# Patient Record
Sex: FEMALE | Race: OTHER | NOT HISPANIC OR LATINO | URBAN - METROPOLITAN AREA
[De-identification: names, ages, dates, MRNs, and addresses within clinical notes are randomized per-mention and may not be internally consistent; named-entity substitution may affect disease eponyms.]

---

## 2017-03-24 ENCOUNTER — OUTPATIENT (OUTPATIENT)
Dept: OUTPATIENT SERVICES | Facility: HOSPITAL | Age: 67
LOS: 1 days | Discharge: HOME | End: 2017-03-24

## 2017-03-24 DIAGNOSIS — C91.10 CHRONIC LYMPHOCYTIC LEUKEMIA OF B-CELL TYPE NOT HAVING ACHIEVED REMISSION: ICD-10-CM

## 2017-03-26 ENCOUNTER — RESULT REVIEW (OUTPATIENT)
Age: 67
End: 2017-03-26

## 2017-03-27 ENCOUNTER — APPOINTMENT (OUTPATIENT)
Dept: HEMATOLOGY ONCOLOGY | Facility: CLINIC | Age: 67
End: 2017-03-27

## 2017-03-27 VITALS
BODY MASS INDEX: 25.97 KG/M2 | HEART RATE: 80 BPM | SYSTOLIC BLOOD PRESSURE: 150 MMHG | DIASTOLIC BLOOD PRESSURE: 90 MMHG | TEMPERATURE: 99.2 F | RESPIRATION RATE: 16 BRPM | WEIGHT: 152.12 LBS

## 2017-03-27 LAB
EOSINOPHIL # BLD AUTO: 0.1 K/UL — SIGNIFICANT CHANGE UP (ref 0–0.5)
EOSINOPHIL NFR BLD AUTO: 1 % — SIGNIFICANT CHANGE UP (ref 0–6)
HCT VFR BLD CALC: 39.6 % — SIGNIFICANT CHANGE UP (ref 34.5–45)
HGB BLD-MCNC: 12.9 G/DL — SIGNIFICANT CHANGE UP (ref 11.5–15.5)
LYMPHOCYTES # BLD AUTO: 60 K/UL — HIGH (ref 1–3.3)
LYMPHOCYTES # BLD AUTO: 87 % — HIGH (ref 13–44)
LYMPHOCYTES # SPEC AUTO: 4 % — HIGH (ref 0–0)
MCHC RBC-ENTMCNC: 28.2 PG — SIGNIFICANT CHANGE UP (ref 27–34)
MCHC RBC-ENTMCNC: 32.5 G/DL — SIGNIFICANT CHANGE UP (ref 32–36)
MCV RBC AUTO: 86.7 FL — SIGNIFICANT CHANGE UP (ref 80–100)
MONOCYTES # BLD AUTO: 0.7 K/UL — SIGNIFICANT CHANGE UP (ref 0–0.9)
MONOCYTES NFR BLD AUTO: 1 % — LOW (ref 2–14)
NEUTROPHILS # BLD AUTO: 2.8 K/UL — SIGNIFICANT CHANGE UP (ref 1.8–7.4)
NEUTROPHILS NFR BLD AUTO: 7 % — LOW (ref 43–77)
PLAT MORPH BLD: NORMAL — SIGNIFICANT CHANGE UP
PLATELET # BLD AUTO: 132 K/UL — LOW (ref 150–400)
RBC # BLD: 4.56 M/UL — SIGNIFICANT CHANGE UP (ref 3.8–5.2)
RBC # FLD: 12.5 % — SIGNIFICANT CHANGE UP (ref 10.3–14.5)
RBC BLD AUTO: SIGNIFICANT CHANGE UP
WBC # BLD: 67 K/UL — CRITICAL HIGH (ref 3.8–10.5)
WBC # FLD AUTO: 67 K/UL — CRITICAL HIGH (ref 3.8–10.5)

## 2017-03-27 RX ORDER — ALPRAZOLAM 0.25 MG/1
0.25 TABLET ORAL
Qty: 60 | Refills: 0 | Status: ACTIVE | COMMUNITY
Start: 2017-03-27 | End: 1900-01-01

## 2017-04-06 LAB
ALBUMIN SERPL ELPH-MCNC: 4.4 G/DL
ALP BLD-CCNC: 90 U/L
ALT SERPL-CCNC: 13 U/L
ANION GAP SERPL CALC-SCNC: 14 MMOL/L
AST SERPL-CCNC: 16 U/L
BILIRUB SERPL-MCNC: 0.3 MG/DL
BUN SERPL-MCNC: 11 MG/DL
CALCIUM SERPL-MCNC: 10.2 MG/DL
CHLORIDE SERPL-SCNC: 108 MMOL/L
CO2 SERPL-SCNC: 24 MMOL/L
CREAT SERPL-MCNC: 0.7 MG/DL
GLUCOSE SERPL-MCNC: 124 MG/DL
LDH SERPL-CCNC: 163 U/L
POTASSIUM SERPL-SCNC: 4.2 MMOL/L
PROT SERPL-MCNC: 6.4 G/DL
SODIUM SERPL-SCNC: 146 MMOL/L

## 2017-06-29 ENCOUNTER — OUTPATIENT (OUTPATIENT)
Dept: OUTPATIENT SERVICES | Facility: HOSPITAL | Age: 67
LOS: 1 days | Discharge: HOME | End: 2017-06-29

## 2017-06-29 DIAGNOSIS — C91.10 CHRONIC LYMPHOCYTIC LEUKEMIA OF B-CELL TYPE NOT HAVING ACHIEVED REMISSION: ICD-10-CM

## 2017-07-03 ENCOUNTER — APPOINTMENT (OUTPATIENT)
Dept: HEMATOLOGY ONCOLOGY | Facility: CLINIC | Age: 67
End: 2017-07-03

## 2017-08-02 ENCOUNTER — OUTPATIENT (OUTPATIENT)
Dept: OUTPATIENT SERVICES | Facility: HOSPITAL | Age: 67
LOS: 1 days | Discharge: HOME | End: 2017-08-02

## 2017-08-02 DIAGNOSIS — C91.10 CHRONIC LYMPHOCYTIC LEUKEMIA OF B-CELL TYPE NOT HAVING ACHIEVED REMISSION: ICD-10-CM

## 2017-08-07 ENCOUNTER — RESULT REVIEW (OUTPATIENT)
Age: 67
End: 2017-08-07

## 2017-08-07 ENCOUNTER — APPOINTMENT (OUTPATIENT)
Dept: HEMATOLOGY ONCOLOGY | Facility: CLINIC | Age: 67
End: 2017-08-07
Payer: MEDICARE

## 2017-08-07 VITALS
OXYGEN SATURATION: 100 % | RESPIRATION RATE: 16 BRPM | BODY MASS INDEX: 25.78 KG/M2 | TEMPERATURE: 97.6 F | HEART RATE: 66 BPM | HEIGHT: 64.17 IN | WEIGHT: 151.02 LBS | SYSTOLIC BLOOD PRESSURE: 172 MMHG | DIASTOLIC BLOOD PRESSURE: 78 MMHG

## 2017-08-07 LAB
BASOPHILS # BLD AUTO: 0.2 K/UL — SIGNIFICANT CHANGE UP (ref 0–0.2)
EOSINOPHIL # BLD AUTO: 0.2 K/UL — SIGNIFICANT CHANGE UP (ref 0–0.5)
HCT VFR BLD CALC: 39.1 % — SIGNIFICANT CHANGE UP (ref 34.5–45)
HGB BLD-MCNC: 13.4 G/DL — SIGNIFICANT CHANGE UP (ref 11.5–15.5)
LYMPHOCYTES # BLD AUTO: 66.2 K/UL — HIGH (ref 1–3.3)
LYMPHOCYTES # BLD AUTO: 94 % — HIGH (ref 13–44)
MCHC RBC-ENTMCNC: 30 PG — SIGNIFICANT CHANGE UP (ref 27–34)
MCHC RBC-ENTMCNC: 34.2 G/DL — SIGNIFICANT CHANGE UP (ref 32–36)
MCV RBC AUTO: 87.6 FL — SIGNIFICANT CHANGE UP (ref 80–100)
MONOCYTES # BLD AUTO: 0.9 K/UL — SIGNIFICANT CHANGE UP (ref 0–0.9)
MONOCYTES NFR BLD AUTO: 1 % — LOW (ref 2–14)
NEUTROPHILS # BLD AUTO: 3.5 K/UL — SIGNIFICANT CHANGE UP (ref 1.8–7.4)
NEUTROPHILS NFR BLD AUTO: 5 % — LOW (ref 43–77)
PLAT MORPH BLD: NORMAL — SIGNIFICANT CHANGE UP
PLATELET # BLD AUTO: 144 K/UL — LOW (ref 150–400)
RBC # BLD: 4.47 M/UL — SIGNIFICANT CHANGE UP (ref 3.8–5.2)
RBC # FLD: 12.4 % — SIGNIFICANT CHANGE UP (ref 10.3–14.5)
RBC BLD AUTO: SIGNIFICANT CHANGE UP
SMUDGE CELLS # BLD: PRESENT — SIGNIFICANT CHANGE UP
WBC # BLD: 71.2 K/UL — CRITICAL HIGH (ref 3.8–10.5)
WBC # FLD AUTO: 71.2 K/UL — CRITICAL HIGH (ref 3.8–10.5)

## 2017-08-07 PROCEDURE — 99213 OFFICE O/P EST LOW 20 MIN: CPT

## 2017-08-14 ENCOUNTER — APPOINTMENT (OUTPATIENT)
Dept: HEMATOLOGY ONCOLOGY | Facility: CLINIC | Age: 67
End: 2017-08-14
Payer: MEDICARE

## 2017-11-09 ENCOUNTER — OUTPATIENT (OUTPATIENT)
Dept: OUTPATIENT SERVICES | Facility: HOSPITAL | Age: 67
LOS: 1 days | Discharge: HOME | End: 2017-11-09

## 2017-11-09 DIAGNOSIS — C91.10 CHRONIC LYMPHOCYTIC LEUKEMIA OF B-CELL TYPE NOT HAVING ACHIEVED REMISSION: ICD-10-CM

## 2017-11-13 ENCOUNTER — RESULT REVIEW (OUTPATIENT)
Age: 67
End: 2017-11-13

## 2017-11-13 ENCOUNTER — APPOINTMENT (OUTPATIENT)
Dept: HEMATOLOGY ONCOLOGY | Facility: CLINIC | Age: 67
End: 2017-11-13

## 2017-11-13 LAB
BASOPHILS # BLD AUTO: 0.3 K/UL — HIGH (ref 0–0.2)
EOSINOPHIL # BLD AUTO: 0.2 K/UL — SIGNIFICANT CHANGE UP (ref 0–0.5)
HCT VFR BLD CALC: 40.9 % — SIGNIFICANT CHANGE UP (ref 34.5–45)
HGB BLD-MCNC: 13.8 G/DL — SIGNIFICANT CHANGE UP (ref 11.5–15.5)
LYMPHOCYTES # BLD AUTO: 62.1 K/UL — HIGH (ref 1–3.3)
LYMPHOCYTES # BLD AUTO: 85 % — HIGH (ref 13–44)
LYMPHOCYTES # SPEC AUTO: 3 % — HIGH (ref 0–0)
MCHC RBC-ENTMCNC: 29.6 PG — SIGNIFICANT CHANGE UP (ref 27–34)
MCHC RBC-ENTMCNC: 33.7 G/DL — SIGNIFICANT CHANGE UP (ref 32–36)
MCV RBC AUTO: 87.8 FL — SIGNIFICANT CHANGE UP (ref 80–100)
MONOCYTES # BLD AUTO: 0.7 K/UL — SIGNIFICANT CHANGE UP (ref 0–0.9)
MONOCYTES NFR BLD AUTO: 1 % — LOW (ref 2–14)
NEUTROPHILS # BLD AUTO: 3.5 K/UL — SIGNIFICANT CHANGE UP (ref 1.8–7.4)
NEUTROPHILS NFR BLD AUTO: 11 % — LOW (ref 43–77)
PLAT MORPH BLD: NORMAL — SIGNIFICANT CHANGE UP
PLATELET # BLD AUTO: 164 K/UL — SIGNIFICANT CHANGE UP (ref 150–400)
RBC # BLD: 4.66 M/UL — SIGNIFICANT CHANGE UP (ref 3.8–5.2)
RBC # FLD: 12.3 % — SIGNIFICANT CHANGE UP (ref 10.3–14.5)
RBC BLD AUTO: SIGNIFICANT CHANGE UP
SMUDGE CELLS # BLD: PRESENT — SIGNIFICANT CHANGE UP
WBC # BLD: 66.8 K/UL — CRITICAL HIGH (ref 3.8–10.5)
WBC # FLD AUTO: 66.8 K/UL — CRITICAL HIGH (ref 3.8–10.5)

## 2018-01-18 ENCOUNTER — OUTPATIENT (OUTPATIENT)
Dept: OUTPATIENT SERVICES | Facility: HOSPITAL | Age: 68
LOS: 1 days | Discharge: HOME | End: 2018-01-18

## 2018-01-18 DIAGNOSIS — C91.10 CHRONIC LYMPHOCYTIC LEUKEMIA OF B-CELL TYPE NOT HAVING ACHIEVED REMISSION: ICD-10-CM

## 2018-01-22 ENCOUNTER — RESULT REVIEW (OUTPATIENT)
Age: 68
End: 2018-01-22

## 2018-01-22 ENCOUNTER — APPOINTMENT (OUTPATIENT)
Dept: HEMATOLOGY ONCOLOGY | Facility: CLINIC | Age: 68
End: 2018-01-22
Payer: MEDICARE

## 2018-01-22 ENCOUNTER — APPOINTMENT (OUTPATIENT)
Dept: INFUSION THERAPY | Facility: HOSPITAL | Age: 68
End: 2018-01-22
Payer: MEDICARE

## 2018-01-22 VITALS
TEMPERATURE: 98.1 F | RESPIRATION RATE: 16 BRPM | WEIGHT: 149.91 LBS | OXYGEN SATURATION: 99 % | HEART RATE: 73 BPM | DIASTOLIC BLOOD PRESSURE: 80 MMHG | BODY MASS INDEX: 25.59 KG/M2 | SYSTOLIC BLOOD PRESSURE: 164 MMHG

## 2018-01-22 VITALS — DIASTOLIC BLOOD PRESSURE: 78 MMHG | SYSTOLIC BLOOD PRESSURE: 142 MMHG

## 2018-01-22 LAB
ANISOCYTOSIS BLD QL: SLIGHT — SIGNIFICANT CHANGE UP
BASOPHILS # BLD AUTO: 0.4 K/UL — HIGH (ref 0–0.2)
EOSINOPHIL # BLD AUTO: 0.1 K/UL — SIGNIFICANT CHANGE UP (ref 0–0.5)
HCT VFR BLD CALC: 40.7 % — SIGNIFICANT CHANGE UP (ref 34.5–45)
HGB BLD-MCNC: 13.9 G/DL — SIGNIFICANT CHANGE UP (ref 11.5–15.5)
LYMPHOCYTES # BLD AUTO: 66.1 K/UL — HIGH (ref 1–3.3)
LYMPHOCYTES # BLD AUTO: 93 % — HIGH (ref 13–44)
LYMPHOCYTES # SPEC AUTO: 1 % — HIGH (ref 0–0)
MCHC RBC-ENTMCNC: 29.9 PG — SIGNIFICANT CHANGE UP (ref 27–34)
MCHC RBC-ENTMCNC: 34.2 G/DL — SIGNIFICANT CHANGE UP (ref 32–36)
MCV RBC AUTO: 87.5 FL — SIGNIFICANT CHANGE UP (ref 80–100)
MONOCYTES # BLD AUTO: 0.5 K/UL — SIGNIFICANT CHANGE UP (ref 0–0.9)
MONOCYTES NFR BLD AUTO: 1 % — LOW (ref 2–14)
NEUTROPHILS # BLD AUTO: 3.5 K/UL — SIGNIFICANT CHANGE UP (ref 1.8–7.4)
NEUTROPHILS NFR BLD AUTO: 5 % — LOW (ref 43–77)
PLAT MORPH BLD: NORMAL — SIGNIFICANT CHANGE UP
PLATELET # BLD AUTO: 139 K/UL — LOW (ref 150–400)
POIKILOCYTOSIS BLD QL AUTO: SLIGHT — SIGNIFICANT CHANGE UP
POLYCHROMASIA BLD QL SMEAR: SLIGHT — SIGNIFICANT CHANGE UP
RBC # BLD: 4.65 M/UL — SIGNIFICANT CHANGE UP (ref 3.8–5.2)
RBC # FLD: 12.3 % — SIGNIFICANT CHANGE UP (ref 10.3–14.5)
RBC BLD AUTO: SIGNIFICANT CHANGE UP
SMUDGE CELLS # BLD: PRESENT — SIGNIFICANT CHANGE UP
WBC # BLD: 70.7 K/UL — CRITICAL HIGH (ref 3.8–10.5)
WBC # FLD AUTO: 70.7 K/UL — CRITICAL HIGH (ref 3.8–10.5)

## 2018-01-22 PROCEDURE — 99213 OFFICE O/P EST LOW 20 MIN: CPT | Mod: 25

## 2018-01-22 PROCEDURE — G0008: CPT

## 2018-01-22 RX ORDER — CARVEDILOL 3.12 MG/1
3.12 TABLET, FILM COATED ORAL
Qty: 180 | Refills: 0 | Status: ACTIVE | COMMUNITY
Start: 2017-09-19

## 2018-01-22 RX ORDER — LISINOPRIL AND HYDROCHLOROTHIAZIDE TABLETS 20; 25 MG/1; MG/1
20-25 TABLET ORAL
Qty: 30 | Refills: 0 | Status: ACTIVE | COMMUNITY
Start: 2017-07-24

## 2018-04-19 ENCOUNTER — OUTPATIENT (OUTPATIENT)
Dept: OUTPATIENT SERVICES | Facility: HOSPITAL | Age: 68
LOS: 1 days | Discharge: HOME | End: 2018-04-19

## 2018-04-19 DIAGNOSIS — C91.10 CHRONIC LYMPHOCYTIC LEUKEMIA OF B-CELL TYPE NOT HAVING ACHIEVED REMISSION: ICD-10-CM

## 2018-04-23 ENCOUNTER — RESULT REVIEW (OUTPATIENT)
Age: 68
End: 2018-04-23

## 2018-04-23 ENCOUNTER — APPOINTMENT (OUTPATIENT)
Dept: HEMATOLOGY ONCOLOGY | Facility: CLINIC | Age: 68
End: 2018-04-23
Payer: MEDICARE

## 2018-04-23 VITALS
RESPIRATION RATE: 16 BRPM | DIASTOLIC BLOOD PRESSURE: 80 MMHG | SYSTOLIC BLOOD PRESSURE: 120 MMHG | HEART RATE: 68 BPM | TEMPERATURE: 98.2 F | WEIGHT: 152.12 LBS | BODY MASS INDEX: 25.97 KG/M2 | OXYGEN SATURATION: 98 %

## 2018-04-23 LAB
BASOPHILS # BLD AUTO: 0 K/UL — SIGNIFICANT CHANGE UP (ref 0–0.2)
EOSINOPHIL # BLD AUTO: 0.1 K/UL — SIGNIFICANT CHANGE UP (ref 0–0.5)
HCT VFR BLD CALC: 39.7 % — SIGNIFICANT CHANGE UP (ref 34.5–45)
HGB BLD-MCNC: 13.6 G/DL — SIGNIFICANT CHANGE UP (ref 11.5–15.5)
LYMPHOCYTES # BLD AUTO: 72.3 K/UL — HIGH (ref 1–3.3)
LYMPHOCYTES # BLD AUTO: 94 % — HIGH (ref 13–44)
MCHC RBC-ENTMCNC: 29.8 PG — SIGNIFICANT CHANGE UP (ref 27–34)
MCHC RBC-ENTMCNC: 34.3 G/DL — SIGNIFICANT CHANGE UP (ref 32–36)
MCV RBC AUTO: 86.9 FL — SIGNIFICANT CHANGE UP (ref 80–100)
MONOCYTES # BLD AUTO: 0.9 K/UL — SIGNIFICANT CHANGE UP (ref 0–0.9)
MONOCYTES NFR BLD AUTO: 1 % — LOW (ref 2–14)
NEUTROPHILS # BLD AUTO: 3.4 K/UL — SIGNIFICANT CHANGE UP (ref 1.8–7.4)
NEUTROPHILS NFR BLD AUTO: 5 % — LOW (ref 43–77)
PLAT MORPH BLD: NORMAL — SIGNIFICANT CHANGE UP
PLATELET # BLD AUTO: 147 K/UL — LOW (ref 150–400)
RBC # BLD: 4.57 M/UL — SIGNIFICANT CHANGE UP (ref 3.8–5.2)
RBC # FLD: 12.5 % — SIGNIFICANT CHANGE UP (ref 10.3–14.5)
RBC BLD AUTO: NORMAL — SIGNIFICANT CHANGE UP
SMUDGE CELLS # BLD: PRESENT — SIGNIFICANT CHANGE UP
WBC # BLD: 76.8 K/UL — CRITICAL HIGH (ref 3.8–10.5)
WBC # FLD AUTO: 76.8 K/UL — CRITICAL HIGH (ref 3.8–10.5)

## 2018-04-23 PROCEDURE — 99213 OFFICE O/P EST LOW 20 MIN: CPT

## 2018-07-12 ENCOUNTER — OUTPATIENT (OUTPATIENT)
Dept: OUTPATIENT SERVICES | Facility: HOSPITAL | Age: 68
LOS: 1 days | Discharge: HOME | End: 2018-07-12

## 2018-07-12 DIAGNOSIS — C91.10 CHRONIC LYMPHOCYTIC LEUKEMIA OF B-CELL TYPE NOT HAVING ACHIEVED REMISSION: ICD-10-CM

## 2018-07-16 ENCOUNTER — RESULT REVIEW (OUTPATIENT)
Age: 68
End: 2018-07-16

## 2018-07-16 ENCOUNTER — APPOINTMENT (OUTPATIENT)
Dept: HEMATOLOGY ONCOLOGY | Facility: CLINIC | Age: 68
End: 2018-07-16
Payer: MEDICARE

## 2018-07-16 VITALS
RESPIRATION RATE: 16 BRPM | TEMPERATURE: 97.8 F | DIASTOLIC BLOOD PRESSURE: 77 MMHG | HEART RATE: 68 BPM | BODY MASS INDEX: 26.16 KG/M2 | SYSTOLIC BLOOD PRESSURE: 164 MMHG | WEIGHT: 153.22 LBS | OXYGEN SATURATION: 95 %

## 2018-07-16 LAB
BASOPHILS # BLD AUTO: 0.1 K/UL — SIGNIFICANT CHANGE UP (ref 0–0.2)
EOSINOPHIL # BLD AUTO: 0.1 K/UL — SIGNIFICANT CHANGE UP (ref 0–0.5)
HCT VFR BLD CALC: 40.4 % — SIGNIFICANT CHANGE UP (ref 34.5–45)
HGB BLD-MCNC: 13.7 G/DL — SIGNIFICANT CHANGE UP (ref 11.5–15.5)
LYMPHOCYTES # BLD AUTO: 71.6 K/UL — HIGH (ref 1–3.3)
LYMPHOCYTES # BLD AUTO: 92 % — HIGH (ref 13–44)
MCHC RBC-ENTMCNC: 29.6 PG — SIGNIFICANT CHANGE UP (ref 27–34)
MCHC RBC-ENTMCNC: 33.9 G/DL — SIGNIFICANT CHANGE UP (ref 32–36)
MCV RBC AUTO: 87.3 FL — SIGNIFICANT CHANGE UP (ref 80–100)
MONOCYTES # BLD AUTO: 0.3 K/UL — SIGNIFICANT CHANGE UP (ref 0–0.9)
NEUTROPHILS # BLD AUTO: 3.6 K/UL — SIGNIFICANT CHANGE UP (ref 1.8–7.4)
NEUTROPHILS NFR BLD AUTO: 8 % — LOW (ref 43–77)
PLAT MORPH BLD: NORMAL — SIGNIFICANT CHANGE UP
PLATELET # BLD AUTO: 136 K/UL — LOW (ref 150–400)
RBC # BLD: 4.63 M/UL — SIGNIFICANT CHANGE UP (ref 3.8–5.2)
RBC # FLD: 12.4 % — SIGNIFICANT CHANGE UP (ref 10.3–14.5)
RBC BLD AUTO: NORMAL — SIGNIFICANT CHANGE UP
SMUDGE CELLS # BLD: PRESENT — SIGNIFICANT CHANGE UP
WBC # BLD: 75.7 K/UL — CRITICAL HIGH (ref 3.8–10.5)
WBC # FLD AUTO: 75.7 K/UL — CRITICAL HIGH (ref 3.8–10.5)

## 2018-07-16 PROCEDURE — 99213 OFFICE O/P EST LOW 20 MIN: CPT

## 2018-10-26 ENCOUNTER — OUTPATIENT (OUTPATIENT)
Dept: OUTPATIENT SERVICES | Facility: HOSPITAL | Age: 68
LOS: 1 days | Discharge: HOME | End: 2018-10-26

## 2018-10-26 DIAGNOSIS — C91.10 CHRONIC LYMPHOCYTIC LEUKEMIA OF B-CELL TYPE NOT HAVING ACHIEVED REMISSION: ICD-10-CM

## 2018-11-05 ENCOUNTER — RESULT REVIEW (OUTPATIENT)
Age: 68
End: 2018-11-05

## 2018-11-05 ENCOUNTER — APPOINTMENT (OUTPATIENT)
Dept: INFUSION THERAPY | Facility: HOSPITAL | Age: 68
End: 2018-11-05
Payer: MEDICARE

## 2018-11-05 ENCOUNTER — APPOINTMENT (OUTPATIENT)
Dept: HEMATOLOGY ONCOLOGY | Facility: CLINIC | Age: 68
End: 2018-11-05
Payer: MEDICARE

## 2018-11-05 VITALS
WEIGHT: 152.12 LBS | HEART RATE: 76 BPM | BODY MASS INDEX: 25.97 KG/M2 | RESPIRATION RATE: 16 BRPM | TEMPERATURE: 98.3 F | DIASTOLIC BLOOD PRESSURE: 87 MMHG | SYSTOLIC BLOOD PRESSURE: 172 MMHG | OXYGEN SATURATION: 99 %

## 2018-11-05 LAB
ANISOCYTOSIS BLD QL: SLIGHT — SIGNIFICANT CHANGE UP
BASOPHILS # BLD AUTO: 0.3 K/UL — HIGH (ref 0–0.2)
ELLIPTOCYTES BLD QL SMEAR: SLIGHT — SIGNIFICANT CHANGE UP
EOSINOPHIL # BLD AUTO: 0.1 K/UL — SIGNIFICANT CHANGE UP (ref 0–0.5)
HCT VFR BLD CALC: 41 % — SIGNIFICANT CHANGE UP (ref 34.5–45)
HGB BLD-MCNC: 13.8 G/DL — SIGNIFICANT CHANGE UP (ref 11.5–15.5)
LYMPHOCYTES # BLD AUTO: 67.5 K/UL — HIGH (ref 1–3.3)
LYMPHOCYTES # BLD AUTO: 93 % — HIGH (ref 13–44)
MACROCYTES BLD QL: SLIGHT — SIGNIFICANT CHANGE UP
MCHC RBC-ENTMCNC: 29.5 PG — SIGNIFICANT CHANGE UP (ref 27–34)
MCHC RBC-ENTMCNC: 33.7 G/DL — SIGNIFICANT CHANGE UP (ref 32–36)
MCV RBC AUTO: 87.5 FL — SIGNIFICANT CHANGE UP (ref 80–100)
MICROCYTES BLD QL: SLIGHT — SIGNIFICANT CHANGE UP
MONOCYTES # BLD AUTO: 0.5 K/UL — SIGNIFICANT CHANGE UP (ref 0–0.9)
NEUTROPHILS # BLD AUTO: 2.9 K/UL — SIGNIFICANT CHANGE UP (ref 1.8–7.4)
NEUTROPHILS NFR BLD AUTO: 6 % — LOW (ref 43–77)
NEUTS BAND # BLD: 1 % — SIGNIFICANT CHANGE UP (ref 0–8)
PLAT MORPH BLD: NORMAL — SIGNIFICANT CHANGE UP
PLATELET # BLD AUTO: 143 K/UL — LOW (ref 150–400)
POIKILOCYTOSIS BLD QL AUTO: SLIGHT — SIGNIFICANT CHANGE UP
POLYCHROMASIA BLD QL SMEAR: SLIGHT — SIGNIFICANT CHANGE UP
RBC # BLD: 4.69 M/UL — SIGNIFICANT CHANGE UP (ref 3.8–5.2)
RBC # FLD: 12.5 % — SIGNIFICANT CHANGE UP (ref 10.3–14.5)
RBC BLD AUTO: SIGNIFICANT CHANGE UP
SMUDGE CELLS # BLD: PRESENT — SIGNIFICANT CHANGE UP
WBC # BLD: 71.3 K/UL — CRITICAL HIGH (ref 3.8–10.5)
WBC # FLD AUTO: 71.3 K/UL — CRITICAL HIGH (ref 3.8–10.5)

## 2018-11-05 PROCEDURE — 99213 OFFICE O/P EST LOW 20 MIN: CPT

## 2018-11-05 PROCEDURE — G0008: CPT

## 2018-11-05 NOTE — HISTORY OF PRESENT ILLNESS
[de-identified] : CLL- Stage I diagnosed 1/2012, del 13q [de-identified] : Patient presents for a follow up, accompanied by her brother. She is doing well today, continues to have severe anxiety about her disease. Her blood pressure was 172/87 today, mostly because she continues to be anxious, at home her pressures are better.  She denies any loss of appetite, no fever/chills, no night sweats, no recent infections, no chest pain, no SOB, no abdominal pain, no n/v/d.

## 2018-11-05 NOTE — ASSESSMENT
[FreeTextEntry1] : 68 year old female with Stage I CLL, del 13q, continues to have stable disease and remains asymptomatic.  \par She has no palpable lymphadenopathy or splenomegaly.  Her counts are also unchanged, and she does not require any hematologic intervention at this time.\par She continues to exhibit severe anxiety in relation to her disease, I have stressed psychiatry consult- she continues to not complete this. \par Flu shot today.\par Continue periodic monitoring, every 3-4 months. \par \par

## 2018-11-05 NOTE — PHYSICAL EXAM
[Fully active, able to carry on all pre-disease performance without restriction] : Status 0 - Fully active, able to carry on all pre-disease performance without restriction [Normal] : affect appropriate [de-identified] : Visibly anxious  [de-identified] : no palpable cervical/axillary, inguinal lymph nodes.

## 2018-11-05 NOTE — REASON FOR VISIT
[Follow-Up Visit] : a follow-up visit for [CLL] : chronic lymphocytic leukemia [Family Member] : family member

## 2018-11-05 NOTE — ADDENDUM
[FreeTextEntry1] : Documented by Keisha Abbasi acting as a scribe for Dr. Barbara Renteria on 11/05//2018.\par \par All medical record entries made by the Scribe were at my, Dr. Barbara Renteria's, direction and personally dictated by me on 11/05/2018. I have reviewed the chart and agree that  the record accurately reflects my personal performance of the history, physical exam, assessment and plan. I have also personally directed, reviewed, and agree with the discharge instructions.\par

## 2018-11-05 NOTE — REVIEW OF SYSTEMS
[Insomnia] : insomnia [Anxiety] : anxiety [Depression] : depression [Negative] : Allergic/Immunologic [Suicidal] : not suicidal

## 2019-02-26 ENCOUNTER — OUTPATIENT (OUTPATIENT)
Dept: OUTPATIENT SERVICES | Facility: HOSPITAL | Age: 69
LOS: 1 days | Discharge: HOME | End: 2019-02-26

## 2019-02-26 DIAGNOSIS — C91.10 CHRONIC LYMPHOCYTIC LEUKEMIA OF B-CELL TYPE NOT HAVING ACHIEVED REMISSION: ICD-10-CM

## 2019-03-11 ENCOUNTER — RESULT REVIEW (OUTPATIENT)
Age: 69
End: 2019-03-11

## 2019-03-11 ENCOUNTER — APPOINTMENT (OUTPATIENT)
Dept: HEMATOLOGY ONCOLOGY | Facility: CLINIC | Age: 69
End: 2019-03-11
Payer: MEDICARE

## 2019-03-11 VITALS
SYSTOLIC BLOOD PRESSURE: 180 MMHG | TEMPERATURE: 97.7 F | RESPIRATION RATE: 16 BRPM | BODY MASS INDEX: 25.78 KG/M2 | HEART RATE: 79 BPM | OXYGEN SATURATION: 97 % | WEIGHT: 151.01 LBS | DIASTOLIC BLOOD PRESSURE: 83 MMHG

## 2019-03-11 LAB
BASOPHILS # BLD AUTO: 0.1 K/UL — SIGNIFICANT CHANGE UP (ref 0–0.2)
EOSINOPHIL # BLD AUTO: 0.1 K/UL — SIGNIFICANT CHANGE UP (ref 0–0.5)
HCT VFR BLD CALC: 41.4 % — SIGNIFICANT CHANGE UP (ref 34.5–45)
HGB BLD-MCNC: 13.8 G/DL — SIGNIFICANT CHANGE UP (ref 11.5–15.5)
LYMPHOCYTES # BLD AUTO: 74.4 K/UL — HIGH (ref 1–3.3)
LYMPHOCYTES # BLD AUTO: 90 % — HIGH (ref 13–44)
MCHC RBC-ENTMCNC: 28.9 PG — SIGNIFICANT CHANGE UP (ref 27–34)
MCHC RBC-ENTMCNC: 33.4 G/DL — SIGNIFICANT CHANGE UP (ref 32–36)
MCV RBC AUTO: 86.5 FL — SIGNIFICANT CHANGE UP (ref 80–100)
MONOCYTES # BLD AUTO: 0.4 K/UL — SIGNIFICANT CHANGE UP (ref 0–0.9)
MONOCYTES NFR BLD AUTO: 1 % — LOW (ref 2–14)
NEUTROPHILS # BLD AUTO: 3.3 K/UL — SIGNIFICANT CHANGE UP (ref 1.8–7.4)
NEUTROPHILS NFR BLD AUTO: 8 % — LOW (ref 43–77)
PLAT MORPH BLD: NORMAL — SIGNIFICANT CHANGE UP
PLATELET # BLD AUTO: 171 K/UL — SIGNIFICANT CHANGE UP (ref 150–400)
RBC # BLD: 4.78 M/UL — SIGNIFICANT CHANGE UP (ref 3.8–5.2)
RBC # FLD: 12.5 % — SIGNIFICANT CHANGE UP (ref 10.3–14.5)
RBC BLD AUTO: SIGNIFICANT CHANGE UP
SMUDGE CELLS # BLD: PRESENT — SIGNIFICANT CHANGE UP
VARIANT LYMPHS # BLD: 1 % — SIGNIFICANT CHANGE UP (ref 0–6)
WBC # BLD: 78.4 K/UL — CRITICAL HIGH (ref 3.8–10.5)
WBC # FLD AUTO: 78.4 K/UL — CRITICAL HIGH (ref 3.8–10.5)

## 2019-03-11 PROCEDURE — 99213 OFFICE O/P EST LOW 20 MIN: CPT

## 2019-03-12 NOTE — ASSESSMENT
[FreeTextEntry1] : 68 year old female with Stage I CLL, del 13q, continues to have stable disease and remains asymptomatic.  \par She has stable axillary lymphadenopathy, no palpable splenomegaly or constitutional symptoms.\par Her counts are also unchanged- no anemia or thrombocytopenia. \par I have again stressed her to see psychiatry.  \par Check her CMP, [facility] today.  \par Continue periodic monitoring, every 3-4 months. \par \par

## 2019-03-12 NOTE — REVIEW OF SYSTEMS
[Suicidal] : not suicidal [Insomnia] : insomnia [Anxiety] : anxiety [Depression] : depression [Negative] : Allergic/Immunologic

## 2019-03-12 NOTE — HISTORY OF PRESENT ILLNESS
[de-identified] : CLL- Stage I diagnosed 1/2012, del 13q [de-identified] : Patient presents for a follow up.  Overall she continues to severe anxiety, continues to not see psychiatry.  Her BP remains high in the office but decreases to normal at home.  She has no new symptoms or complaints.  She denies any night sweats, no weight loss, no change in her appetite.  \par She has no chest pain, no SOB, no abdominal c/o, no n/v/d.

## 2019-03-12 NOTE — PHYSICAL EXAM
[Fully active, able to carry on all pre-disease performance without restriction] : Status 0 - Fully active, able to carry on all pre-disease performance without restriction [Normal] : affect appropriate [de-identified] : Visibly anxious  [de-identified] : bilateral axillary lymph nodes, around 1-2 cm, no palpable cervical or axillary lymph nodes

## 2019-03-13 LAB
ALBUMIN SERPL ELPH-MCNC: 4.5 G/DL
ALBUMIN SERPL ELPH-MCNC: 4.5 G/DL
ALBUMIN SERPL ELPH-MCNC: 4.6 G/DL
ALP BLD-CCNC: 72 U/L
ALP BLD-CCNC: 77 U/L
ALP BLD-CCNC: 78 U/L
ALP BLD-CCNC: 78 U/L
ALP BLD-CCNC: 80 U/L
ALT SERPL-CCNC: 17 U/L
ALT SERPL-CCNC: 18 U/L
ALT SERPL-CCNC: 20 U/L
ANION GAP SERPL CALC-SCNC: 11 MMOL/L
ANION GAP SERPL CALC-SCNC: 12 MMOL/L
ANION GAP SERPL CALC-SCNC: 13 MMOL/L
ANION GAP SERPL CALC-SCNC: 15 MMOL/L
ANION GAP SERPL CALC-SCNC: 17 MMOL/L
AST SERPL-CCNC: 19 U/L
AST SERPL-CCNC: 19 U/L
AST SERPL-CCNC: 20 U/L
AST SERPL-CCNC: 21 U/L
AST SERPL-CCNC: 22 U/L
BILIRUB SERPL-MCNC: 0.2 MG/DL
BILIRUB SERPL-MCNC: 0.3 MG/DL
BILIRUB SERPL-MCNC: 0.4 MG/DL
BUN SERPL-MCNC: 14 MG/DL
BUN SERPL-MCNC: 15 MG/DL
BUN SERPL-MCNC: 16 MG/DL
BUN SERPL-MCNC: 17 MG/DL
BUN SERPL-MCNC: 17 MG/DL
CALCIUM SERPL-MCNC: 10.4 MG/DL
CALCIUM SERPL-MCNC: 10.5 MG/DL
CALCIUM SERPL-MCNC: 10.5 MG/DL
CALCIUM SERPL-MCNC: 10.7 MG/DL
CALCIUM SERPL-MCNC: 10.7 MG/DL
CHLORIDE SERPL-SCNC: 101 MMOL/L
CHLORIDE SERPL-SCNC: 102 MMOL/L
CHLORIDE SERPL-SCNC: 104 MMOL/L
CO2 SERPL-SCNC: 25 MMOL/L
CO2 SERPL-SCNC: 26 MMOL/L
CO2 SERPL-SCNC: 28 MMOL/L
CO2 SERPL-SCNC: 28 MMOL/L
CO2 SERPL-SCNC: 29 MMOL/L
CREAT SERPL-MCNC: 0.65 MG/DL
CREAT SERPL-MCNC: 0.68 MG/DL
CREAT SERPL-MCNC: 0.69 MG/DL
CREAT SERPL-MCNC: 0.72 MG/DL
CREAT SERPL-MCNC: 0.72 MG/DL
GLUCOSE SERPL-MCNC: 111 MG/DL
GLUCOSE SERPL-MCNC: 112 MG/DL
GLUCOSE SERPL-MCNC: 113 MG/DL
GLUCOSE SERPL-MCNC: 136 MG/DL
GLUCOSE SERPL-MCNC: 90 MG/DL
LDH SERPL-CCNC: 159 U/L
LDH SERPL-CCNC: 162 U/L
LDH SERPL-CCNC: 170 U/L
LDH SERPL-CCNC: 177 U/L
LDH SERPL-CCNC: 183 U/L
POTASSIUM SERPL-SCNC: 3.1 MMOL/L
POTASSIUM SERPL-SCNC: 3.2 MMOL/L
POTASSIUM SERPL-SCNC: 3.4 MMOL/L
POTASSIUM SERPL-SCNC: 3.4 MMOL/L
POTASSIUM SERPL-SCNC: 3.9 MMOL/L
PROT SERPL-MCNC: 6.4 G/DL
PROT SERPL-MCNC: 6.5 G/DL
PROT SERPL-MCNC: 6.7 G/DL
PROT SERPL-MCNC: 6.8 G/DL
PROT SERPL-MCNC: 6.8 G/DL
SODIUM SERPL-SCNC: 142 MMOL/L
SODIUM SERPL-SCNC: 144 MMOL/L
SODIUM SERPL-SCNC: 145 MMOL/L

## 2019-06-12 ENCOUNTER — OUTPATIENT (OUTPATIENT)
Dept: OUTPATIENT SERVICES | Facility: HOSPITAL | Age: 69
LOS: 1 days | Discharge: HOME | End: 2019-06-12

## 2019-06-12 DIAGNOSIS — C91.10 CHRONIC LYMPHOCYTIC LEUKEMIA OF B-CELL TYPE NOT HAVING ACHIEVED REMISSION: ICD-10-CM

## 2019-06-17 ENCOUNTER — APPOINTMENT (OUTPATIENT)
Dept: HEMATOLOGY ONCOLOGY | Facility: CLINIC | Age: 69
End: 2019-06-17
Payer: MEDICARE

## 2019-06-17 ENCOUNTER — RESULT REVIEW (OUTPATIENT)
Age: 69
End: 2019-06-17

## 2019-06-17 VITALS
TEMPERATURE: 98.2 F | WEIGHT: 149.91 LBS | BODY MASS INDEX: 25.59 KG/M2 | SYSTOLIC BLOOD PRESSURE: 162 MMHG | RESPIRATION RATE: 17 BRPM | OXYGEN SATURATION: 97 % | DIASTOLIC BLOOD PRESSURE: 76 MMHG | HEART RATE: 67 BPM

## 2019-06-17 LAB
BASOPHILS # BLD AUTO: 0.3 K/UL — HIGH (ref 0–0.2)
EOSINOPHIL # BLD AUTO: 0.1 K/UL — SIGNIFICANT CHANGE UP (ref 0–0.5)
HCT VFR BLD CALC: 40.2 % — SIGNIFICANT CHANGE UP (ref 34.5–45)
HGB BLD-MCNC: 13.5 G/DL — SIGNIFICANT CHANGE UP (ref 11.5–15.5)
LYMPHOCYTES # BLD AUTO: 70 K/UL — HIGH (ref 1–3.3)
LYMPHOCYTES # BLD AUTO: 91 % — HIGH (ref 13–44)
LYMPHOCYTES # SPEC AUTO: 2 % — HIGH (ref 0–0)
MCHC RBC-ENTMCNC: 29.9 PG — SIGNIFICANT CHANGE UP (ref 27–34)
MCHC RBC-ENTMCNC: 33.5 G/DL — SIGNIFICANT CHANGE UP (ref 32–36)
MCV RBC AUTO: 89.3 FL — SIGNIFICANT CHANGE UP (ref 80–100)
MONOCYTES # BLD AUTO: 0.6 K/UL — SIGNIFICANT CHANGE UP (ref 0–0.9)
MONOCYTES NFR BLD AUTO: 2 % — SIGNIFICANT CHANGE UP (ref 2–14)
NEUTROPHILS # BLD AUTO: 2.7 K/UL — SIGNIFICANT CHANGE UP (ref 1.8–7.4)
NEUTROPHILS NFR BLD AUTO: 5 % — LOW (ref 43–77)
PLAT MORPH BLD: NORMAL — SIGNIFICANT CHANGE UP
PLATELET # BLD AUTO: 148 K/UL — LOW (ref 150–400)
RBC # BLD: 4.5 M/UL — SIGNIFICANT CHANGE UP (ref 3.8–5.2)
RBC # FLD: 13.2 % — SIGNIFICANT CHANGE UP (ref 10.3–14.5)
RBC BLD AUTO: SIGNIFICANT CHANGE UP
WBC # BLD: 73.6 K/UL — CRITICAL HIGH (ref 3.8–10.5)
WBC # FLD AUTO: 73.6 K/UL — CRITICAL HIGH (ref 3.8–10.5)

## 2019-06-17 PROCEDURE — 99214 OFFICE O/P EST MOD 30 MIN: CPT

## 2019-06-17 NOTE — ASSESSMENT
[FreeTextEntry1] : 69 year old female with Stage I CLL, del 13q, continues to have stable disease and remains asymptomatic.  \par She has stable axillary lymphadenopathy, no palpable splenomegaly or constitutional symptoms.\par Her counts are also unchanged- no anemia or thrombocytopenia. \par I have again stressed her to see psychiatry/psychologist.  \par Continue periodic monitoring, every 3-4 months. \par \par

## 2019-06-17 NOTE — HISTORY OF PRESENT ILLNESS
[de-identified] : CLL- Stage I diagnosed 1/2012, del 13q [de-identified] : Patient presents for a follow up visit today. She notes feeling intermittent fatigue, bouts of SOB triggered by anxiety. Overall she continues to have severe anxiety, and does not appear open to seeing a psychologist.  Her weight remains unchanged. She denies any night sweats, no weight loss, no change in her appetite.  She has no chest pain, no SOB, no abdominal c/o, no n/v/d.  \par \par

## 2019-06-17 NOTE — ADDENDUM
[FreeTextEntry1] : Documented by Jose Antonio Mazariegos acting as a scribe for Dr. Barbara Renteria on 6/17/2019.\par \par All medical record entries made by the Scribe were at my, Dr. Barbara Renteria's, direction and personally dictated by me on 6/17/2019. I have reviewed the chart and agree that  the record accurately reflects my personal performance of the history, physical exam, assessment and plan. I have also personally directed, reviewed, and agree with the discharge instructions.\par

## 2019-06-17 NOTE — PHYSICAL EXAM
[Fully active, able to carry on all pre-disease performance without restriction] : Status 0 - Fully active, able to carry on all pre-disease performance without restriction [Normal] : grossly intact [de-identified] : Visibly anxious  [de-identified] : bilateral axillary lymph nodes, around 1-2 cm, no palpable cervical, small left supraclavicular LN, no inguinal lymph nodes

## 2019-06-17 NOTE — REVIEW OF SYSTEMS
[Fatigue] : fatigue [Insomnia] : insomnia [Anxiety] : anxiety [Depression] : depression [Negative] : Heme/Lymph [Suicidal] : not suicidal

## 2019-09-30 ENCOUNTER — OUTPATIENT (OUTPATIENT)
Dept: OUTPATIENT SERVICES | Facility: HOSPITAL | Age: 69
LOS: 1 days | Discharge: HOME | End: 2019-09-30

## 2019-09-30 DIAGNOSIS — C91.10 CHRONIC LYMPHOCYTIC LEUKEMIA OF B-CELL TYPE NOT HAVING ACHIEVED REMISSION: ICD-10-CM

## 2019-11-12 ENCOUNTER — OUTPATIENT (OUTPATIENT)
Dept: OUTPATIENT SERVICES | Facility: HOSPITAL | Age: 69
LOS: 1 days | Discharge: HOME | End: 2019-11-12

## 2019-11-12 DIAGNOSIS — C91.10 CHRONIC LYMPHOCYTIC LEUKEMIA OF B-CELL TYPE NOT HAVING ACHIEVED REMISSION: ICD-10-CM

## 2019-11-14 ENCOUNTER — OTHER (OUTPATIENT)
Age: 69
End: 2019-11-14

## 2019-11-18 ENCOUNTER — RESULT REVIEW (OUTPATIENT)
Age: 69
End: 2019-11-18

## 2019-11-18 ENCOUNTER — APPOINTMENT (OUTPATIENT)
Dept: HEMATOLOGY ONCOLOGY | Facility: CLINIC | Age: 69
End: 2019-11-18
Payer: MEDICARE

## 2019-11-18 VITALS
HEART RATE: 66 BPM | OXYGEN SATURATION: 98 % | BODY MASS INDEX: 25.03 KG/M2 | DIASTOLIC BLOOD PRESSURE: 75 MMHG | TEMPERATURE: 98.9 F | SYSTOLIC BLOOD PRESSURE: 153 MMHG | RESPIRATION RATE: 16 BRPM | WEIGHT: 146.59 LBS

## 2019-11-18 LAB
BASOPHILS # BLD AUTO: 0.5 K/UL — HIGH (ref 0–0.2)
EOSINOPHIL # BLD AUTO: 0.1 K/UL — SIGNIFICANT CHANGE UP (ref 0–0.5)
HCT VFR BLD CALC: 39.6 % — SIGNIFICANT CHANGE UP (ref 34.5–45)
HGB BLD-MCNC: 13.8 G/DL — SIGNIFICANT CHANGE UP (ref 11.5–15.5)
LYMPHOCYTES # BLD AUTO: 74 K/UL — HIGH (ref 1–3.3)
LYMPHOCYTES # BLD AUTO: 83 % — HIGH (ref 13–44)
LYMPHOCYTES # SPEC AUTO: 5 % — HIGH (ref 0–0)
MCHC RBC-ENTMCNC: 31.3 PG — SIGNIFICANT CHANGE UP (ref 27–34)
MCHC RBC-ENTMCNC: 34.7 G/DL — SIGNIFICANT CHANGE UP (ref 32–36)
MCV RBC AUTO: 90 FL — SIGNIFICANT CHANGE UP (ref 80–100)
MONOCYTES # BLD AUTO: 0.7 K/UL — SIGNIFICANT CHANGE UP (ref 0–0.9)
MONOCYTES NFR BLD AUTO: 4 % — SIGNIFICANT CHANGE UP (ref 2–14)
NEUTROPHILS # BLD AUTO: 4 K/UL — SIGNIFICANT CHANGE UP (ref 1.8–7.4)
NEUTROPHILS NFR BLD AUTO: 8 % — LOW (ref 43–77)
PLAT MORPH BLD: NORMAL — SIGNIFICANT CHANGE UP
PLATELET # BLD AUTO: 122 K/UL — LOW (ref 150–400)
RBC # BLD: 4.4 M/UL — SIGNIFICANT CHANGE UP (ref 3.8–5.2)
RBC # FLD: 12.6 % — SIGNIFICANT CHANGE UP (ref 10.3–14.5)
RBC BLD AUTO: SIGNIFICANT CHANGE UP
SMUDGE CELLS # BLD: PRESENT — SIGNIFICANT CHANGE UP
WBC # BLD: 79.3 K/UL — CRITICAL HIGH (ref 3.8–10.5)
WBC # FLD AUTO: 79.3 K/UL — CRITICAL HIGH (ref 3.8–10.5)

## 2019-11-18 PROCEDURE — 99214 OFFICE O/P EST MOD 30 MIN: CPT

## 2019-11-18 NOTE — REASON FOR VISIT
[CLL] : chronic lymphocytic leukemia [Follow-Up Visit] : a follow-up visit for [Family Member] : family member

## 2019-11-18 NOTE — REVIEW OF SYSTEMS
[Fatigue] : fatigue [Anxiety] : anxiety [Depression] : depression [Negative] : Allergic/Immunologic [Suicidal] : not suicidal

## 2019-11-18 NOTE — PHYSICAL EXAM
[Fully active, able to carry on all pre-disease performance without restriction] : Status 0 - Fully active, able to carry on all pre-disease performance without restriction [Normal] : affect appropriate [de-identified] : Visibly anxious  [de-identified] : Bilateral axillary lymph nodes, around left 1.5-2 cm, right <.5cm no palpable cervical, small left supraclavicular LN, no inguinal lymph nodes

## 2019-11-18 NOTE — ADDENDUM
[FreeTextEntry1] : Documented by Jadyn Real acting as a scribe for Dr. Renteria on 11/18/2019\par \par All medical record entries made by the Scribe were at my, Dr. Renteria's, direction and\par personally dictated by me on 11/18/2019. I have reviewed the chart and agree that the record\par accurately reflects my personal performance of the history, physical exam, procedure and imaging.

## 2019-11-18 NOTE — ASSESSMENT
[FreeTextEntry1] : 69 year old female with Stage I CLL, del 13q, continues to have stable disease and remains asymptomatic.  \par She has stable axillary lymphadenopathy, no palpable splenomegaly or constitutional symptoms.\par Her counts are also unchanged- no anemia, she has had a slight thrombocytopenia since 2017- stable.  \par If worsens, will needs to perform a bone marrow biopsy. \par Again have attempted to reassure patient about her diagnosis and potential treatment options.  \par I have again stressed her to see psychiatry/psychologist.  \par Routine HCM.\par Continue periodic monitoring, every 3-4 months. \par \par

## 2019-11-18 NOTE — HISTORY OF PRESENT ILLNESS
[de-identified] : CLL- Stage I diagnosed 1/2012, del 13q [de-identified] : Patient presents for a follow up appointment. She continues to have severe anxiety, has not been seen by psychologist. She continues to feel intermittent fatigue. Notes a 3 lb weight loss, feels that it is due to her anxiety. She denies any night sweats, no weight loss, no change in her appetite.  She has no chest pain, no SOB, no abdominal c/o, no n/v/d.

## 2020-02-22 ENCOUNTER — OUTPATIENT (OUTPATIENT)
Dept: OUTPATIENT SERVICES | Facility: HOSPITAL | Age: 70
LOS: 1 days | Discharge: HOME | End: 2020-02-22

## 2020-02-22 DIAGNOSIS — C91.00 ACUTE LYMPHOBLASTIC LEUKEMIA NOT HAVING ACHIEVED REMISSION: ICD-10-CM

## 2020-02-24 ENCOUNTER — RESULT REVIEW (OUTPATIENT)
Age: 70
End: 2020-02-24

## 2020-02-24 ENCOUNTER — APPOINTMENT (OUTPATIENT)
Dept: HEMATOLOGY ONCOLOGY | Facility: CLINIC | Age: 70
End: 2020-02-24
Payer: MEDICARE

## 2020-02-24 VITALS
DIASTOLIC BLOOD PRESSURE: 82 MMHG | RESPIRATION RATE: 16 BRPM | SYSTOLIC BLOOD PRESSURE: 155 MMHG | WEIGHT: 152.56 LBS | HEART RATE: 69 BPM | TEMPERATURE: 98.3 F | OXYGEN SATURATION: 97 % | BODY MASS INDEX: 26.05 KG/M2

## 2020-02-24 LAB
BASOPHILS # BLD AUTO: 0.5 K/UL — HIGH (ref 0–0.2)
EOSINOPHIL # BLD AUTO: 0.1 K/UL — SIGNIFICANT CHANGE UP (ref 0–0.5)
HCT VFR BLD CALC: 38.6 % — SIGNIFICANT CHANGE UP (ref 34.5–45)
HGB BLD-MCNC: 13 G/DL — SIGNIFICANT CHANGE UP (ref 11.5–15.5)
LYMPHOCYTES # BLD AUTO: 77.2 K/UL — HIGH (ref 1–3.3)
LYMPHOCYTES # BLD AUTO: 96 % — HIGH (ref 13–44)
MCHC RBC-ENTMCNC: 30 PG — SIGNIFICANT CHANGE UP (ref 27–34)
MCHC RBC-ENTMCNC: 33.6 G/DL — SIGNIFICANT CHANGE UP (ref 32–36)
MCV RBC AUTO: 89.1 FL — SIGNIFICANT CHANGE UP (ref 80–100)
MONOCYTES # BLD AUTO: 1 K/UL — HIGH (ref 0–0.9)
MONOCYTES NFR BLD AUTO: 1 % — LOW (ref 2–14)
NEUTROPHILS # BLD AUTO: 2.1 K/UL — SIGNIFICANT CHANGE UP (ref 1.8–7.4)
NEUTROPHILS NFR BLD AUTO: 3 % — LOW (ref 43–77)
PLAT MORPH BLD: NORMAL — SIGNIFICANT CHANGE UP
PLATELET # BLD AUTO: 118 K/UL — LOW (ref 150–400)
RBC # BLD: 4.33 M/UL — SIGNIFICANT CHANGE UP (ref 3.8–5.2)
RBC # FLD: 12.8 % — SIGNIFICANT CHANGE UP (ref 10.3–14.5)
RBC BLD AUTO: SIGNIFICANT CHANGE UP
SMUDGE CELLS # BLD: PRESENT — SIGNIFICANT CHANGE UP
WBC # BLD: 82.2 K/UL — CRITICAL HIGH (ref 3.8–10.5)
WBC # FLD AUTO: 82.2 K/UL — CRITICAL HIGH (ref 3.8–10.5)

## 2020-02-24 PROCEDURE — 99214 OFFICE O/P EST MOD 30 MIN: CPT

## 2020-02-24 NOTE — REVIEW OF SYSTEMS
[Fatigue] : fatigue [Anxiety] : anxiety [Depression] : depression [Negative] : Allergic/Immunologic [Chills] : no chills [Fever] : no fever [Recent Change In Weight] : ~T no recent weight change [Night Sweats] : no night sweats [Suicidal] : not suicidal

## 2020-02-24 NOTE — PHYSICAL EXAM
[Fully active, able to carry on all pre-disease performance without restriction] : Status 0 - Fully active, able to carry on all pre-disease performance without restriction [Normal] : affect appropriate [de-identified] : Bilateral axillary lymph nodes, around left 1-1.5 cm, right <.5cm no palpable cervical, small left supraclavicular LN, no inguinal lymph nodes [de-identified] : Visibly anxious

## 2020-02-24 NOTE — ADDENDUM
[FreeTextEntry1] : Documented by Jadyn Real acting as a scribe for Dr. Renteria on 02/24/2020\par \par All medical record entries made by the Scribe were at my, Dr. Renteria's, direction and\par personally dictated by me on 02/24/2020. I have reviewed the chart and agree that the record\par accurately reflects my personal performance of the history, physical exam, procedure and imaging.

## 2020-02-24 NOTE — ASSESSMENT
[FreeTextEntry1] : 69 year old female with Stage I CLL, del 13q, continues to have stable disease and remains asymptomatic.  \par She has stable axillary lymphadenopathy, no palpable splenomegaly or constitutional symptoms.\par Her counts are also unchanged- no anemia, she has had a slight thrombocytopenia since 2017.  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  Check an abdominal sonogram.  If it continues to drop, would likely do a bone marrow biopsy.   \par Again have attempted to reassure patient about her diagnosis and potential treatment options.  \par I have again stressed her to see psychiatry/psychologist.  \par Continue periodic monitoring, every 3 months. \par Any change in her symptoms, will recommend follow up earlier.\par \par

## 2020-02-24 NOTE — HISTORY OF PRESENT ILLNESS
[de-identified] : CLL- Stage I diagnosed 1/2012, del 13q [de-identified] : Patient presents for a follow up appointment. She is accompanied by her brother.  She continues to have significant anxiety/stress.  She otherwise has been doing well, notes she began Vitamin C supplements, denies any other medications/NSAIDs/antibiotics.  She continues to feel intermittent fatigue.  She denies any night sweats, no weight loss, no change in her appetite.  She has no chest pain, no SOB, no abdominal c/o, no n/v/d.

## 2020-04-04 ENCOUNTER — INPATIENT (INPATIENT)
Facility: HOSPITAL | Age: 70
LOS: 4 days | Discharge: HOME | DRG: 177 | End: 2020-04-09
Attending: INTERNAL MEDICINE | Admitting: STUDENT IN AN ORGANIZED HEALTH CARE EDUCATION/TRAINING PROGRAM
Payer: MEDICARE

## 2020-04-04 VITALS
OXYGEN SATURATION: 100 % | HEART RATE: 79 BPM | WEIGHT: 145.06 LBS | DIASTOLIC BLOOD PRESSURE: 76 MMHG | TEMPERATURE: 100 F | SYSTOLIC BLOOD PRESSURE: 151 MMHG

## 2020-04-04 DIAGNOSIS — C91.10 CHRONIC LYMPHOCYTIC LEUKEMIA OF B-CELL TYPE NOT HAVING ACHIEVED REMISSION: ICD-10-CM

## 2020-04-04 DIAGNOSIS — J12.81 PNEUMONIA DUE TO SARS-ASSOCIATED CORONAVIRUS: ICD-10-CM

## 2020-04-04 DIAGNOSIS — J96.01 ACUTE RESPIRATORY FAILURE WITH HYPOXIA: ICD-10-CM

## 2020-04-04 DIAGNOSIS — E87.6 HYPOKALEMIA: ICD-10-CM

## 2020-04-04 DIAGNOSIS — R68.89 OTHER GENERAL SYMPTOMS AND SIGNS: ICD-10-CM

## 2020-04-04 DIAGNOSIS — A41.9 SEPSIS, UNSPECIFIED ORGANISM: ICD-10-CM

## 2020-04-04 LAB
ALBUMIN SERPL ELPH-MCNC: 3.8 G/DL — SIGNIFICANT CHANGE UP (ref 3.3–5)
ALP SERPL-CCNC: 80 U/L — SIGNIFICANT CHANGE UP (ref 40–120)
ALT FLD-CCNC: 27 U/L — SIGNIFICANT CHANGE UP (ref 10–45)
ANION GAP SERPL CALC-SCNC: 15 MMOL/L — SIGNIFICANT CHANGE UP (ref 5–17)
APTT BLD: 28.8 SEC — SIGNIFICANT CHANGE UP (ref 27.5–36.3)
AST SERPL-CCNC: 27 U/L — SIGNIFICANT CHANGE UP (ref 10–40)
BASOPHILS # BLD AUTO: 0 K/UL — SIGNIFICANT CHANGE UP (ref 0–0.2)
BASOPHILS NFR BLD AUTO: 0 % — SIGNIFICANT CHANGE UP (ref 0–2)
BILIRUB SERPL-MCNC: 0.5 MG/DL — SIGNIFICANT CHANGE UP (ref 0.2–1.2)
BUN SERPL-MCNC: 16 MG/DL — SIGNIFICANT CHANGE UP (ref 7–23)
CALCIUM SERPL-MCNC: 9.7 MG/DL — SIGNIFICANT CHANGE UP (ref 8.4–10.5)
CHLORIDE SERPL-SCNC: 90 MMOL/L — LOW (ref 96–108)
CO2 SERPL-SCNC: 30 MMOL/L — SIGNIFICANT CHANGE UP (ref 22–31)
CREAT SERPL-MCNC: 0.68 MG/DL — SIGNIFICANT CHANGE UP (ref 0.5–1.3)
CRP SERPL-MCNC: 31.48 MG/DL — HIGH (ref 0–0.4)
D DIMER BLD IA.RAPID-MCNC: 365 NG/ML DDU — HIGH
EOSINOPHIL # BLD AUTO: 0 K/UL — SIGNIFICANT CHANGE UP (ref 0–0.5)
EOSINOPHIL NFR BLD AUTO: 0 % — SIGNIFICANT CHANGE UP (ref 0–6)
GAS PNL BLDV: SIGNIFICANT CHANGE UP
GLUCOSE SERPL-MCNC: 133 MG/DL — HIGH (ref 70–99)
HCT VFR BLD CALC: 39.9 % — SIGNIFICANT CHANGE UP (ref 34.5–45)
HGB BLD-MCNC: 12.5 G/DL — SIGNIFICANT CHANGE UP (ref 11.5–15.5)
INR BLD: 1.03 RATIO — SIGNIFICANT CHANGE UP (ref 0.88–1.16)
LDH SERPL L TO P-CCNC: 215 U/L — SIGNIFICANT CHANGE UP (ref 50–242)
LYMPHOCYTES # BLD AUTO: 55.55 K/UL — HIGH (ref 1–3.3)
LYMPHOCYTES # BLD AUTO: 73 % — HIGH (ref 13–44)
MCHC RBC-ENTMCNC: 27.8 PG — SIGNIFICANT CHANGE UP (ref 27–34)
MCHC RBC-ENTMCNC: 31.3 GM/DL — LOW (ref 32–36)
MCV RBC AUTO: 88.9 FL — SIGNIFICANT CHANGE UP (ref 80–100)
MONOCYTES # BLD AUTO: 1.52 K/UL — HIGH (ref 0–0.9)
MONOCYTES NFR BLD AUTO: 2 % — SIGNIFICANT CHANGE UP (ref 2–14)
NEUTROPHILS # BLD AUTO: 12.18 K/UL — HIGH (ref 1.8–7.4)
NEUTROPHILS NFR BLD AUTO: 16 % — LOW (ref 43–77)
PLATELET # BLD AUTO: 221 K/UL — SIGNIFICANT CHANGE UP (ref 150–400)
POTASSIUM SERPL-MCNC: 2.9 MMOL/L — CRITICAL LOW (ref 3.5–5.3)
POTASSIUM SERPL-SCNC: 2.9 MMOL/L — CRITICAL LOW (ref 3.5–5.3)
PROCALCITONIN SERPL-MCNC: 0.34 NG/ML — HIGH (ref 0.02–0.1)
PROT SERPL-MCNC: 7.1 G/DL — SIGNIFICANT CHANGE UP (ref 6–8.3)
PROTHROM AB SERPL-ACNC: 11.8 SEC — SIGNIFICANT CHANGE UP (ref 10–12.9)
RBC # BLD: 4.49 M/UL — SIGNIFICANT CHANGE UP (ref 3.8–5.2)
RBC # FLD: 14.5 % — SIGNIFICANT CHANGE UP (ref 10.3–14.5)
SARS-COV-2 RNA SPEC QL NAA+PROBE: DETECTED
SODIUM SERPL-SCNC: 135 MMOL/L — SIGNIFICANT CHANGE UP (ref 135–145)
WBC # BLD: 76.1 K/UL — CRITICAL HIGH (ref 3.8–10.5)
WBC # FLD AUTO: 76.1 K/UL — CRITICAL HIGH (ref 3.8–10.5)

## 2020-04-04 PROCEDURE — 99291 CRITICAL CARE FIRST HOUR: CPT

## 2020-04-04 PROCEDURE — 71045 X-RAY EXAM CHEST 1 VIEW: CPT | Mod: 26

## 2020-04-04 PROCEDURE — 85060 BLOOD SMEAR INTERPRETATION: CPT

## 2020-04-04 PROCEDURE — 99233 SBSQ HOSP IP/OBS HIGH 50: CPT

## 2020-04-04 RX ORDER — ACETAMINOPHEN 500 MG
650 TABLET ORAL ONCE
Refills: 0 | Status: COMPLETED | OUTPATIENT
Start: 2020-04-04 | End: 2020-04-04

## 2020-04-04 RX ORDER — CARVEDILOL PHOSPHATE 80 MG/1
3.12 CAPSULE, EXTENDED RELEASE ORAL EVERY 12 HOURS
Refills: 0 | Status: DISCONTINUED | OUTPATIENT
Start: 2020-04-04 | End: 2020-04-09

## 2020-04-04 RX ORDER — POTASSIUM CHLORIDE 20 MEQ
40 PACKET (EA) ORAL ONCE
Refills: 0 | Status: COMPLETED | OUTPATIENT
Start: 2020-04-04 | End: 2020-04-04

## 2020-04-04 RX ORDER — ENOXAPARIN SODIUM 100 MG/ML
40 INJECTION SUBCUTANEOUS DAILY
Refills: 0 | Status: DISCONTINUED | OUTPATIENT
Start: 2020-04-04 | End: 2020-04-05

## 2020-04-04 RX ORDER — POTASSIUM CHLORIDE 20 MEQ
10 PACKET (EA) ORAL
Refills: 0 | Status: COMPLETED | OUTPATIENT
Start: 2020-04-04 | End: 2020-04-04

## 2020-04-04 RX ORDER — LISINOPRIL 2.5 MG/1
10 TABLET ORAL DAILY
Refills: 0 | Status: DISCONTINUED | OUTPATIENT
Start: 2020-04-04 | End: 2020-04-09

## 2020-04-04 RX ADMIN — Medication 100 MILLIEQUIVALENT(S): at 22:09

## 2020-04-04 RX ADMIN — Medication 40 MILLIEQUIVALENT(S): at 22:08

## 2020-04-04 RX ADMIN — Medication 650 MILLIGRAM(S): at 22:47

## 2020-04-04 NOTE — H&P ADULT - HISTORY OF PRESENT ILLNESS
69F c hx CLL (stable, asymptomatic, not treated), HTN, pw 1.5 weeks cough, fever.    History obtained per chart and pt's brother Sigifredo over phone. Pt has had 1.5 weeks of fever and cough. No SOB. Pt's brother was concerned this could be corona, so told her to go to urgent care. At urgent care, pt was found to have O2 of 82%, and was told to go to the hospital. Pt has otherwise been well. Pt's cousin is known to have covid19. Pt lives alone, doesn't exercise.    VS: Tm 100.7, P 82, /73, R 20, 88% RA  In the ed, received Kcl 40 + iV 10x3

## 2020-04-04 NOTE — ED ADULT NURSE REASSESSMENT NOTE - NS ED NURSE REASSESS COMMENT FT1
upon ambulating with pulse ox pt desaturates down to the 80%'s pt placed on 3liters nasal canula and labs sent as ordered pt swabbed for COVID by md pt pending cxray

## 2020-04-04 NOTE — H&P ADULT - NSHPSOCIALHISTORY_GEN_ALL_CORE
Social History:    Marital Status: (  ) , (  ) Single, ( x ) , (  ) , (  )   # of Children: 1 son  Lives with: ( x ) alone, (  ) children, (  ) spouse, (  ) parents, (  ) siblings, (  ) friends, (  ) other:   Occupation:     Substance Use/Illicit Drugs: (  ) never used vs other:   Tobacco Usage: ( x ) never smoked, (  ) former smoker, (  ) current smoker and Total Pack-Years:   Last Alcohol Usage/Frequency/Amount/Withdrawal/Hx of Abuse:  none  Foreign travel:   Animal exposure:

## 2020-04-04 NOTE — ED PROVIDER NOTE - CLINICAL SUMMARY MEDICAL DECISION MAKING FREE TEXT BOX
70 yo female with CLL HTN HLD presenting with 1.5 weeks cough fevers chills and hypoxia. will put on supplemental oxygen, will eval for viral illness such as covid with cbc cmp covid swab cxr ekg, will reassess. likely admit

## 2020-04-04 NOTE — H&P ADULT - NSHPLABSRESULTS_GEN_ALL_CORE
Personally reviewed labs.   Personally reviewed imaging.   QTC reportedly 399.                          12.5   76.10 )-----------( 221      ( 04 Apr 2020 19:02 )             39.9       04-04    135  |  90<L>  |  16  ----------------------------<  133<H>  2.9<LL>   |  30  |  0.68    Ca    9.7      04 Apr 2020 19:02    TPro  7.1  /  Alb  3.8  /  TBili  0.5  /  DBili  x   /  AST  27  /  ALT  27  /  AlkPhos  80  04-04            LIVER FUNCTIONS - ( 04 Apr 2020 19:02 )  Alb: 3.8 g/dL / Pro: 7.1 g/dL / ALK PHOS: 80 U/L / ALT: 27 U/L / AST: 27 U/L / GGT: x             PT/INR - ( 04 Apr 2020 19:02 )   PT: 11.8 sec;   INR: 1.03 ratio         PTT - ( 04 Apr 2020 19:02 )  PTT:28.8 sec

## 2020-04-04 NOTE — ED PROVIDER NOTE - ATTENDING CONTRIBUTION TO CARE
Agree with above, Dejon Cazares MD, FACEP   Based on patient's history and physical exam, as well as the results of today's workup, I feel that patient warrants admission to the hospital for further workup/evaluation and continued management. I discussed the findings of today's workup with the patient and addressed the patient's questions and concerns. The patient was agreeable with admission. Our team spoke with the inpatient receiving team who accepted the patient for admission and subsequently took over the patient's care at the time of admission.

## 2020-04-04 NOTE — ED ADULT NURSE REASSESSMENT NOTE - NS ED NURSE REASSESS COMMENT FT1
Care assumed of patient at this time. Patient updated to plan of care. Pt is on 2L nc, NAD at this time.

## 2020-04-04 NOTE — ED PROVIDER NOTE - CRITICAL CARE INDICATION, MLM
patient was critically ill... Patient was critically ill with a high probability of imminent organ or life threatening deterioration. hypoxic to low 80s with ambulation

## 2020-04-04 NOTE — H&P ADULT - NSHPREVIEWOFSYSTEMS_GEN_ALL_CORE
REVIEW OF SYSTEMS:  CONSTITUTIONAL: +fevers. +chills.   EYES: No visual changes. No eye pain.  ENT: No hearing difficulty. No vertigo. No sore throat.   NECK: No pain. No stiffness/rigidity.  CARDIAC: No chest pain. No palpitations. No lightheadedness.  RESPIRATORY: No cough. No SOB. No hemoptysis.  GASTROINTESTINAL: No abdominal pain. No nausea. No vomiting. No hematemesis.   NEUROLOGICAL: No numbness/tingling. No headache. No unsteady gait.  BACK: No back pain. No flank pain.  EXTREMITIES: No lower extremity edema. Full ROM.  SKIN: No rashes. No itching. No other lesions.  PSYCHIATRIC: No depression. +anxiety.   All other review of systems is negative unless indicated above.  Unless indicated above, unable to assess ROS 2/2

## 2020-04-04 NOTE — H&P ADULT - ATTENDING COMMENTS
Pt signed out to NP service. Pt signed out to NP service.    J12.81 SARS pna  A41.9 sepsis  J96.01 hypoxic respiratory failure  C91.10 CLL  E87.6 hypokalemia

## 2020-04-04 NOTE — ED ADULT NURSE NOTE - NSIMPLEMENTINTERV_GEN_ALL_ED
Implemented All Universal Safety Interventions:  [place] to call system. Call bell, personal items and telephone within reach. Instruct patient to call for assistance. Room bathroom lighting operational. Non-slip footwear when patient is off stretcher. Physically safe environment: no spills, clutter or unnecessary equipment. Stretcher in lowest position, wheels locked, appropriate side rails in place.

## 2020-04-04 NOTE — H&P ADULT - ASSESSMENT
69F c hx CLL (stable, asymptomatic, not treated), HTN, pw sepsis and hypoxic respiratory failure 2/2 coronavirus pna

## 2020-04-04 NOTE — ED PROVIDER NOTE - PROGRESS NOTE DETAILS
This patient was seen during the time of the COVID-19 pandemic, the care of this patient was in support of the [facility] countermeasures to COVID-19.

## 2020-04-04 NOTE — ED PROVIDER NOTE - OBJECTIVE STATEMENT
70 yo female with pmhx of CLL HTN HLD presenting with fever cough chills for 1.5 weeks. cough persistent, fever tmax 101, went to [facility] today, hypoxic at 82%, sent to ED. 88% on RA and 83% ambulating at ED. 68 yo female with pmhx of CLL HTN HLD presenting with fever cough chills for 1.5 weeks. cough persistent, fever tmax 101, went to [facility] today, hypoxic at 82%, sent to ED. 88% on RA and 83% ambulating at ED.    Denies LOC, CP, SOB.

## 2020-04-04 NOTE — ED ADULT NURSE NOTE - OBJECTIVE STATEMENT
pt 70 yo female presents to er with 2 week hx of fever cough pt states her mother passed away 3 weeks ago was 100yrs old passed at home pt states tylenol taken 8am today skin warm dry pt denies sob oxygen at 93% on room air some nausea pt 68 yo female presents to er with 2 week hx of fever cough pt states her mother passed away 3 weeks ago was 100yrs old passed at home pt states tylenol taken 8am today skin warm dry pt denies sob oxygen at 93% on room air some nausea nom vomiting hx of leukemia htn hypercholesterol on no tx at present

## 2020-04-05 LAB
ALBUMIN SERPL ELPH-MCNC: 3.4 G/DL — SIGNIFICANT CHANGE UP (ref 3.3–5)
ALP SERPL-CCNC: 86 U/L — SIGNIFICANT CHANGE UP (ref 40–120)
ALT FLD-CCNC: 31 U/L — SIGNIFICANT CHANGE UP (ref 10–45)
ANION GAP SERPL CALC-SCNC: 15 MMOL/L — SIGNIFICANT CHANGE UP (ref 5–17)
AST SERPL-CCNC: 34 U/L — SIGNIFICANT CHANGE UP (ref 10–40)
BILIRUB SERPL-MCNC: 0.4 MG/DL — SIGNIFICANT CHANGE UP (ref 0.2–1.2)
BUN SERPL-MCNC: 14 MG/DL — SIGNIFICANT CHANGE UP (ref 7–23)
CALCIUM SERPL-MCNC: 9.5 MG/DL — SIGNIFICANT CHANGE UP (ref 8.4–10.5)
CHLORIDE SERPL-SCNC: 94 MMOL/L — LOW (ref 96–108)
CK SERPL-CCNC: 55 U/L — SIGNIFICANT CHANGE UP (ref 25–170)
CO2 SERPL-SCNC: 29 MMOL/L — SIGNIFICANT CHANGE UP (ref 22–31)
CREAT SERPL-MCNC: 0.57 MG/DL — SIGNIFICANT CHANGE UP (ref 0.5–1.3)
CRP SERPL-MCNC: 32.94 MG/DL — HIGH (ref 0–0.4)
D DIMER BLD IA.RAPID-MCNC: 443 NG/ML DDU — HIGH
FERRITIN SERPL-MCNC: 439 NG/ML — HIGH (ref 15–150)
GLUCOSE SERPL-MCNC: 91 MG/DL — SIGNIFICANT CHANGE UP (ref 70–99)
HCT VFR BLD CALC: 39.2 % — SIGNIFICANT CHANGE UP (ref 34.5–45)
HCV AB S/CO SERPL IA: 1.09 S/CO — HIGH (ref 0–0.99)
HCV AB SERPL-IMP: ABNORMAL
HGB BLD-MCNC: 12.4 G/DL — SIGNIFICANT CHANGE UP (ref 11.5–15.5)
MAGNESIUM SERPL-MCNC: 2.5 MG/DL — SIGNIFICANT CHANGE UP (ref 1.6–2.6)
MCHC RBC-ENTMCNC: 28.6 PG — SIGNIFICANT CHANGE UP (ref 27–34)
MCHC RBC-ENTMCNC: 31.6 GM/DL — LOW (ref 32–36)
MCV RBC AUTO: 90.5 FL — SIGNIFICANT CHANGE UP (ref 80–100)
NRBC # BLD: 0 /100 WBCS — SIGNIFICANT CHANGE UP (ref 0–0)
PHOSPHATE SERPL-MCNC: 3 MG/DL — SIGNIFICANT CHANGE UP (ref 2.5–4.5)
PLATELET # BLD AUTO: 229 K/UL — SIGNIFICANT CHANGE UP (ref 150–400)
POTASSIUM SERPL-MCNC: 4.5 MMOL/L — SIGNIFICANT CHANGE UP (ref 3.5–5.3)
POTASSIUM SERPL-SCNC: 4.5 MMOL/L — SIGNIFICANT CHANGE UP (ref 3.5–5.3)
PROT SERPL-MCNC: 7 G/DL — SIGNIFICANT CHANGE UP (ref 6–8.3)
RBC # BLD: 4.33 M/UL — SIGNIFICANT CHANGE UP (ref 3.8–5.2)
RBC # FLD: 14.6 % — HIGH (ref 10.3–14.5)
SODIUM SERPL-SCNC: 138 MMOL/L — SIGNIFICANT CHANGE UP (ref 135–145)
WBC # BLD: 77.05 K/UL — CRITICAL HIGH (ref 3.8–10.5)
WBC # FLD AUTO: 77.05 K/UL — CRITICAL HIGH (ref 3.8–10.5)

## 2020-04-05 PROCEDURE — 99233 SBSQ HOSP IP/OBS HIGH 50: CPT

## 2020-04-05 RX ORDER — ENOXAPARIN SODIUM 100 MG/ML
40 INJECTION SUBCUTANEOUS
Refills: 0 | Status: DISCONTINUED | OUTPATIENT
Start: 2020-04-05 | End: 2020-04-09

## 2020-04-05 RX ORDER — ACETAMINOPHEN 500 MG
650 TABLET ORAL EVERY 4 HOURS
Refills: 0 | Status: DISCONTINUED | OUTPATIENT
Start: 2020-04-05 | End: 2020-04-09

## 2020-04-05 RX ADMIN — Medication 100 MILLIEQUIVALENT(S): at 01:23

## 2020-04-05 RX ADMIN — ENOXAPARIN SODIUM 40 MILLIGRAM(S): 100 INJECTION SUBCUTANEOUS at 20:30

## 2020-04-05 RX ADMIN — Medication 100 MILLIGRAM(S): at 11:09

## 2020-04-05 RX ADMIN — ENOXAPARIN SODIUM 40 MILLIGRAM(S): 100 INJECTION SUBCUTANEOUS at 11:09

## 2020-04-05 RX ADMIN — CARVEDILOL PHOSPHATE 3.12 MILLIGRAM(S): 80 CAPSULE, EXTENDED RELEASE ORAL at 05:57

## 2020-04-05 RX ADMIN — CARVEDILOL PHOSPHATE 3.12 MILLIGRAM(S): 80 CAPSULE, EXTENDED RELEASE ORAL at 17:24

## 2020-04-05 RX ADMIN — Medication 650 MILLIGRAM(S): at 11:37

## 2020-04-05 RX ADMIN — Medication 100 MILLIEQUIVALENT(S): at 01:36

## 2020-04-05 RX ADMIN — Medication 100 MILLIGRAM(S): at 20:33

## 2020-04-05 RX ADMIN — LISINOPRIL 10 MILLIGRAM(S): 2.5 TABLET ORAL at 05:57

## 2020-04-05 NOTE — PROGRESS NOTE ADULT - SUBJECTIVE AND OBJECTIVE BOX
[facility] Division of Hospital Medicine Progress Note    Patient is a 69y old  Female who presents with a chief complaint of fever, cough (04 Apr 2020 21:31)      SUBJECTIVE / OVERNIGHT EVENTS:  Pt with some shallow cough; temp to 39 earlier this AM, abated during daytime.  Has tolerated low level of O2 supplementation - 2L - with saturations in mid-90s.  ADDITIONAL REVIEW OF SYSTEMS:    MEDICATIONS  (STANDING):  carvedilol 3.125 milliGRAM(s) Oral every 12 hours  enoxaparin Injectable 40 milliGRAM(s) SubCutaneous two times a day  lisinopril 10 milliGRAM(s) Oral daily    MEDICATIONS  (PRN):  acetaminophen   Tablet .. 650 milliGRAM(s) Oral every 4 hours PRN Temp greater or equal to 38C (100.4F)  guaiFENesin   Syrup  (Sugar-Free) 100 milliGRAM(s) Oral every 6 hours PRN Cough      CAPILLARY BLOOD GLUCOSE        I&O's Summary    04 Apr 2020 07:01  -  05 Apr 2020 07:00  --------------------------------------------------------  IN: 440 mL / OUT: 0 mL / NET: 440 mL        PHYSICAL EXAM:  Vital Signs Last 24 Hrs  T(C): 36.8 (05 Apr 2020 16:59), Max: 39.1 (05 Apr 2020 11:23)  T(F): 98.2 (05 Apr 2020 16:59), Max: 102.3 (05 Apr 2020 11:23)  HR: 66 (05 Apr 2020 16:59) (66 - 94)  BP: 128/72 (05 Apr 2020 16:59) (125/73 - 152/66)  BP(mean): --  RR: 18 (05 Apr 2020 16:59) (18 - 19)  SpO2: 97% (05 Apr 2020 16:59) (94% - 97%)  CONSTITUTIONAL: NAD, well-developed, well-groomed  ENMT: Moist oral mucosa, no pharyngeal injection or exudates; normal dentition  RESPIRATORY: Normal respiratory effort; lungs are clear to auscultation bilaterally  CARDIOVASCULAR: Regular rate and rhythm, normal S1 and S2, no murmur/rub/gallop; No lower extremity edema; Peripheral pulses are 2+ bilaterally  ABDOMEN: Nontender to palpation, normoactive bowel sounds, no rebound/guarding; No hepatosplenomegaly  PSYCH: A+O to person, place, and time; affect appropriate  NEUROLOGY: CN 2-12 are intact and symmetric; no gross sensory deficits   SKIN: No rashes; no palpable lesions    LABS:                        12.4   77.05 )-----------( 229      ( 05 Apr 2020 07:49 )             39.2     04-05    138  |  94<L>  |  14  ----------------------------<  91  4.5   |  29  |  0.57    Ca    9.5      05 Apr 2020 07:47  Phos  3.0     04-05  Mg     2.5     04-05    TPro  7.0  /  Alb  3.4  /  TBili  0.4  /  DBili  x   /  AST  34  /  ALT  31  /  AlkPhos  86  04-05    PT/INR - ( 04 Apr 2020 19:02 )   PT: 11.8 sec;   INR: 1.03 ratio         PTT - ( 04 Apr 2020 19:02 )  PTT:28.8 sec  CARDIAC MARKERS ( 05 Apr 2020 07:46 )  x     / x     / 55 U/L / x     / x              COVID-19 PCR: Detected (04-04-20 @ 19:02)      RADIOLOGY & ADDITIONAL TESTS:  Imaging from Last 24 Hours:  Electrocardiogram/QTc Interval:    COORDINATION OF CARE:  Care Discussed with Consultants/Other Providers:

## 2020-04-05 NOTE — PROGRESS NOTE ADULT - PROBLEM SELECTOR PLAN 2
- cont O2 as needed; has tolerated low level of supplementation 2 L with saturations in mid-90s.  - discussed with ID Dr. Payne today - would hold on Plaquenil given favorable saturations at this point.  If develops higher O2 requirement however, or persistent fevers, or upward trending serum inflammatory markers, would then begin Plaquenil.

## 2020-04-06 LAB
-  COAGULASE NEGATIVE STAPHYLOCOCCUS: SIGNIFICANT CHANGE UP
ANION GAP SERPL CALC-SCNC: 13 MMOL/L — SIGNIFICANT CHANGE UP (ref 5–17)
BUN SERPL-MCNC: 16 MG/DL — SIGNIFICANT CHANGE UP (ref 7–23)
CALCIUM SERPL-MCNC: 9.2 MG/DL — SIGNIFICANT CHANGE UP (ref 8.4–10.5)
CHLORIDE SERPL-SCNC: 93 MMOL/L — LOW (ref 96–108)
CO2 SERPL-SCNC: 30 MMOL/L — SIGNIFICANT CHANGE UP (ref 22–31)
CREAT SERPL-MCNC: 0.53 MG/DL — SIGNIFICANT CHANGE UP (ref 0.5–1.3)
GLUCOSE SERPL-MCNC: 83 MG/DL — SIGNIFICANT CHANGE UP (ref 70–99)
GRAM STN FLD: SIGNIFICANT CHANGE UP
HCT VFR BLD CALC: 35.1 % — SIGNIFICANT CHANGE UP (ref 34.5–45)
HGB BLD-MCNC: 11.2 G/DL — LOW (ref 11.5–15.5)
MCHC RBC-ENTMCNC: 29.2 PG — SIGNIFICANT CHANGE UP (ref 27–34)
MCHC RBC-ENTMCNC: 31.9 GM/DL — LOW (ref 32–36)
MCV RBC AUTO: 91.4 FL — SIGNIFICANT CHANGE UP (ref 80–100)
METHOD TYPE: SIGNIFICANT CHANGE UP
NRBC # BLD: 0 /100 WBCS — SIGNIFICANT CHANGE UP (ref 0–0)
PLATELET # BLD AUTO: 221 K/UL — SIGNIFICANT CHANGE UP (ref 150–400)
POTASSIUM SERPL-MCNC: 3.4 MMOL/L — LOW (ref 3.5–5.3)
POTASSIUM SERPL-SCNC: 3.4 MMOL/L — LOW (ref 3.5–5.3)
RBC # BLD: 3.84 M/UL — SIGNIFICANT CHANGE UP (ref 3.8–5.2)
RBC # FLD: 14.9 % — HIGH (ref 10.3–14.5)
SODIUM SERPL-SCNC: 136 MMOL/L — SIGNIFICANT CHANGE UP (ref 135–145)
SPECIMEN SOURCE: SIGNIFICANT CHANGE UP
WBC # BLD: 61.55 K/UL — CRITICAL HIGH (ref 3.8–10.5)
WBC # FLD AUTO: 61.55 K/UL — CRITICAL HIGH (ref 3.8–10.5)

## 2020-04-06 PROCEDURE — 99232 SBSQ HOSP IP/OBS MODERATE 35: CPT

## 2020-04-06 RX ORDER — POTASSIUM CHLORIDE 20 MEQ
40 PACKET (EA) ORAL ONCE
Refills: 0 | Status: COMPLETED | OUTPATIENT
Start: 2020-04-06 | End: 2020-04-06

## 2020-04-06 RX ADMIN — LISINOPRIL 10 MILLIGRAM(S): 2.5 TABLET ORAL at 06:21

## 2020-04-06 RX ADMIN — ENOXAPARIN SODIUM 40 MILLIGRAM(S): 100 INJECTION SUBCUTANEOUS at 06:21

## 2020-04-06 RX ADMIN — Medication 100 MILLIGRAM(S): at 22:18

## 2020-04-06 RX ADMIN — Medication 100 MILLIGRAM(S): at 06:23

## 2020-04-06 RX ADMIN — CARVEDILOL PHOSPHATE 3.12 MILLIGRAM(S): 80 CAPSULE, EXTENDED RELEASE ORAL at 06:21

## 2020-04-06 RX ADMIN — Medication 40 MILLIEQUIVALENT(S): at 13:28

## 2020-04-06 RX ADMIN — CARVEDILOL PHOSPHATE 3.12 MILLIGRAM(S): 80 CAPSULE, EXTENDED RELEASE ORAL at 17:57

## 2020-04-06 RX ADMIN — ENOXAPARIN SODIUM 40 MILLIGRAM(S): 100 INJECTION SUBCUTANEOUS at 17:57

## 2020-04-06 RX ADMIN — Medication 100 MILLIGRAM(S): at 13:25

## 2020-04-06 NOTE — PROGRESS NOTE ADULT - SUBJECTIVE AND OBJECTIVE BOX
01/31/2017OS-2.75+[id number]+2.5020/20&nbsp;SN &nbsp; &nbsp; tck [facility] Division of Hospital Medicine Progress Note    Patient is a 69y old  Female who presents with a chief complaint of fever, cough (05 Apr 2020 20:02)      SUBJECTIVE / OVERNIGHT EVENTS:  Feeling very comfortable on 2L NC.  WInded when she walks Bothered by cough  ADDITIONAL REVIEW OF SYSTEMS:    MEDICATIONS  (STANDING):  carvedilol 3.125 milliGRAM(s) Oral every 12 hours  enoxaparin Injectable 40 milliGRAM(s) SubCutaneous two times a day  lisinopril 10 milliGRAM(s) Oral daily  potassium chloride    Tablet ER 40 milliEquivalent(s) Oral once    MEDICATIONS  (PRN):  acetaminophen   Tablet .. 650 milliGRAM(s) Oral every 4 hours PRN Temp greater or equal to 38C (100.4F)  guaiFENesin   Syrup  (Sugar-Free) 100 milliGRAM(s) Oral every 6 hours PRN Cough  hydrocodone/homatropine Syrup 5 milliLiter(s) Oral every 4 hours PRN Cough      CAPILLARY BLOOD GLUCOSE        I&O's Summary    05 Apr 2020 07:01  -  06 Apr 2020 07:00  --------------------------------------------------------  IN: 240 mL / OUT: 0 mL / NET: 240 mL        PHYSICAL EXAM:  Vital Signs Last 24 Hrs  T(C): 37.3 (06 Apr 2020 09:06), Max: 39.1 (05 Apr 2020 11:23)  T(F): 99.1 (06 Apr 2020 09:06), Max: 102.3 (05 Apr 2020 11:23)  HR: 62 (06 Apr 2020 09:30) (62 - 82)  BP: 116/71 (06 Apr 2020 09:06) (116/71 - 131/71)  BP(mean): --  RR: 18 (06 Apr 2020 09:30) (18 - 20)  SpO2: 94% (06 Apr 2020 09:30) (86% - 99%)  CONSTITUTIONAL: comfortable at rest    RESPIRATORY: no stethescope in room    LABS:                        11.2   61.55 )-----------( 221      ( 06 Apr 2020 08:24 )             35.1     04-06    136  |  93<L>  |  16  ----------------------------<  83  3.4<L>   |  30  |  0.53    Ca    9.2      06 Apr 2020 08:24  Phos  3.0     04-05  Mg     2.5     04-05    TPro  7.0  /  Alb  3.4  /  TBili  0.4  /  DBili  x   /  AST  34  /  ALT  31  /  AlkPhos  86  04-05    PT/INR - ( 04 Apr 2020 19:02 )   PT: 11.8 sec;   INR: 1.03 ratio         PTT - ( 04 Apr 2020 19:02 )  PTT:28.8 sec  CARDIAC MARKERS ( 05 Apr 2020 07:46 )  x     / x     / 55 U/L / x     / x              Culture - Blood (collected 05 Apr 2020 10:02)  Source: .Blood Blood-Venous  Gram Stain (06 Apr 2020 06:46):    Growth in aerobic bottle: Gram Positive Cocci in Clusters  Preliminary Report (06 Apr 2020 06:46):    Growth in aerobic bottle: Gram Positive Cocci in Clusters    ***Blood Panel PCR results on this specimen are available    approximately 3 hours after the Gram stain result.***    Gram stain, PCR, and/or culture results may not always    correspond due to difference in methodologies.    ************************************************************    This PCR assay was performed using [employer].    The following targets are tested for: Enterococcus,    vancomycin resistant enterococci, Listeria monocytogenes,    coagulase negative staphylococci, S. aureus,    methicillin resistant S. aureus, Streptococcus agalactiae    (Group B), S. pneumoniae, S. pyogenes (Group A),    Acinetobacter baumannii, Enterobacter cloacae, E. coli,    Klebsiella oxytoca, K. pneumoniae, Proteus sp.,    Serratia marcescens, Haemophilus influenzae,    Neisseria meningitidis, Pseudomonas aeruginosa, Candida    albicans, C. glabrata, C krusei, C parapsilosis,    C. tropicalis and the KPC resistance gene.  Organism: Blood Culture PCR (06 Apr 2020 07:53)  Organism: Blood Culture PCR (06 Apr 2020 07:53)    Culture - Blood (collected 05 Apr 2020 10:02)  Source: .Blood Blood-Peripheral  Preliminary Report (06 Apr 2020 11:01):    No growth to date.      COVID-19 PCR: Detected (04-04-20 @ 19:02)      RADIOLOGY & ADDITIONAL TESTS:  Imaging from Last 24 Hours:  Electrocardiogram/QTc Interval:    COORDINATION OF CARE:  Care Discussed with Consultants/Other Providers:

## 2020-04-06 NOTE — PROGRESS NOTE ADULT - ASSESSMENT
Doing ok at rest.  Assess breathing while ambulatory.   Hycodan for cough    Dx  J22 unspecified acute lower respiratory infection  B92.29 other coronavirus infection

## 2020-04-06 NOTE — PROVIDER CONTACT NOTE (CRITICAL VALUE NOTIFICATION) - TEST AND RESULT REPORTED:
Blood culture 4/5= gram+ cooci in clusters aerobic bottle; PCR=coagulase negative staphylococcus detected

## 2020-04-06 NOTE — CHART NOTE - NSCHARTNOTEFT_GEN_A_CORE
Pt. with blood culture from 4/5 one set negative to date, second set + on prelim Gram +. Pt febrile last on 4/5, presently afebrile, profound leukocytosis likely d/t h/o of CLL. Pt vital signs stable.     Vital Signs Last 24 Hrs  T(C): 37.1 (06 Apr 2020 21:45), Max: 37.6 (05 Apr 2020 23:51)  T(F): 98.8 (06 Apr 2020 21:45), Max: 99.7 (05 Apr 2020 23:51)  HR: 66 (06 Apr 2020 21:45) (62 - 82)  BP: 134/71 (06 Apr 2020 21:45) (116/71 - 134/71)  BP(mean): --  RR: 20 (06 Apr 2020 21:45) (18 - 20)  SpO2: 94% (06 Apr 2020 21:45) (86% - 99%)                          11.2   61.55 )-----------( 221      ( 06 Apr 2020 08:24 )             35.1     Culture - Blood in AM (04.05.20 @ 10:02)    -  Coagulase negative Staphylococcus: Detec    Gram Stain:   Growth in aerobic bottle: Gram Positive Cocci in Clusters    Specimen Source: .Blood Blood-Venous    Organism: Blood Culture PCR    Culture Results:   Growth in aerobic bottle: Gram Positive Cocci in Clusters  ***Blood Panel PCR results on this specimen are available  approximately 3 hours after the Gram stain result.***  Gram stain, PCR, and/or culture results may not always  correspond due to difference in methodologies.  ************************************************************  This PCR assay was performed using [employer].  The following targets are tested for: Enterococcus,  vancomycin resistant enterococci, Listeria monocytogenes,  coagulase negative staphylococci, S. aureus,  methicillin resistant S. aureus, Streptococcus agalactiae  (Group B), S. pneumoniae, S. pyogenes (Group A),  Acinetobacter baumannii, Enterobacter cloacae, E. coli,  Klebsiella oxytoca, K. pneumoniae, Proteus sp.,  Serratia marcescens, Haemophilus influenzae,  Neisseria meningitidis, Pseudomonas aeruginosa, Candida  albicans, C. glabrata, C krusei, C parapsilosis,  C. tropicalis and the KPC resistance gene.    Organism Identification: Blood Culture PCR    Method Type: PCR    Culture - Blood in AM (04.05.20 @ 10:02)    Specimen Source: .Blood Blood-Peripheral    Culture Results:   No growth to date.    A/P: Pt. with blood culture from 4/5 one set negative to date, second set + on prelim with aerobic Gram + cocci in clusters. Pt febrile last on 4/5, pt is COVID +, presently afebrile, profound leukocytosis likely d/t h/o of CLL as per MD note. Pt vital signs stable presently.     Vital Signs Last 24 Hrs  T(C): 37.1 (06 Apr 2020 21:45), Max: 37.6 (05 Apr 2020 23:51)  T(F): 98.8 (06 Apr 2020 21:45), Max: 99.7 (05 Apr 2020 23:51)  HR: 66 (06 Apr 2020 21:45) (62 - 82)  BP: 134/71 (06 Apr 2020 21:45) (116/71 - 134/71)  BP(mean): --  RR: 20 (06 Apr 2020 21:45) (18 - 20)  SpO2: 94% (06 Apr 2020 21:45) (86% - 99%)                          11.2   61.55 )-----------( 221      ( 06 Apr 2020 08:24 )             35.1     Culture - Blood in AM (04.05.20 @ 10:02)    -  Coagulase negative Staphylococcus: Detec    Gram Stain:   Growth in aerobic bottle: Gram Positive Cocci in Clusters    Specimen Source: .Blood Blood-Venous    Organism: Blood Culture PCR    Culture Results:   Growth in aerobic bottle: Gram Positive Cocci in Clusters  ***Blood Panel PCR results on this specimen are available  approximately 3 hours after the Gram stain result.***  Gram stain, PCR, and/or culture results may not always  correspond due to difference in methodologies.  ************************************************************  This PCR assay was performed using [employer].  The following targets are tested for: Enterococcus,  vancomycin resistant enterococci, Listeria monocytogenes,  coagulase negative staphylococci, S. aureus,  methicillin resistant S. aureus, Streptococcus agalactiae  (Group B), S. pneumoniae, S. pyogenes (Group A),  Acinetobacter baumannii, Enterobacter cloacae, E. coli,  Klebsiella oxytoca, K. pneumoniae, Proteus sp.,  Serratia marcescens, Haemophilus influenzae,  Neisseria meningitidis, Pseudomonas aeruginosa, Candida  albicans, C. glabrata, C krusei, C parapsilosis,  C. tropicalis and the KPC resistance gene.    Organism Identification: Blood Culture PCR    Method Type: PCR    Culture - Blood in AM (04.05.20 @ 10:02)    Specimen Source: .Blood Blood-Peripheral    Culture Results:   No growth to date.    A/P:69F c hx CLL (stable, asymptomatic, not treated), HTN, pw sepsis and hypoxic respiratory failure 2/2 coronavirus pna. Pt with BC x 1 with aerobic Gram + cocci in clusters.    Questioned possible antibiotics with night Hospitalist in charge, Dr. BYRON Rea  Will hold of Abx for now d/t result likely being contaminant as per MD [place]  x [phone]

## 2020-04-07 LAB
ALBUMIN SERPL ELPH-MCNC: 3.1 G/DL — LOW (ref 3.3–5)
ALP SERPL-CCNC: 70 U/L — SIGNIFICANT CHANGE UP (ref 40–120)
ALT FLD-CCNC: 78 U/L — HIGH (ref 10–45)
ANION GAP SERPL CALC-SCNC: 12 MMOL/L — SIGNIFICANT CHANGE UP (ref 5–17)
AST SERPL-CCNC: 63 U/L — HIGH (ref 10–40)
BASOPHILS # BLD AUTO: 0 K/UL — SIGNIFICANT CHANGE UP (ref 0–0.2)
BASOPHILS NFR BLD AUTO: 0 % — SIGNIFICANT CHANGE UP (ref 0–2)
BILIRUB SERPL-MCNC: 0.3 MG/DL — SIGNIFICANT CHANGE UP (ref 0.2–1.2)
BUN SERPL-MCNC: 13 MG/DL — SIGNIFICANT CHANGE UP (ref 7–23)
CALCIUM SERPL-MCNC: 9.1 MG/DL — SIGNIFICANT CHANGE UP (ref 8.4–10.5)
CHLORIDE SERPL-SCNC: 97 MMOL/L — SIGNIFICANT CHANGE UP (ref 96–108)
CO2 SERPL-SCNC: 29 MMOL/L — SIGNIFICANT CHANGE UP (ref 22–31)
CREAT SERPL-MCNC: 0.52 MG/DL — SIGNIFICANT CHANGE UP (ref 0.5–1.3)
CRP SERPL-MCNC: 8.66 MG/DL — HIGH (ref 0–0.4)
CULTURE RESULTS: SIGNIFICANT CHANGE UP
EOSINOPHIL # BLD AUTO: 0 K/UL — SIGNIFICANT CHANGE UP (ref 0–0.5)
EOSINOPHIL NFR BLD AUTO: 0 % — SIGNIFICANT CHANGE UP (ref 0–6)
FERRITIN SERPL-MCNC: 587 NG/ML — HIGH (ref 15–150)
GLUCOSE SERPL-MCNC: 148 MG/DL — HIGH (ref 70–99)
HCT VFR BLD CALC: 39 % — SIGNIFICANT CHANGE UP (ref 34.5–45)
HGB BLD-MCNC: 12.3 G/DL — SIGNIFICANT CHANGE UP (ref 11.5–15.5)
LDH SERPL L TO P-CCNC: 226 U/L — SIGNIFICANT CHANGE UP (ref 50–242)
LYMPHOCYTES # BLD AUTO: 42.95 K/UL — HIGH (ref 1–3.3)
LYMPHOCYTES # BLD AUTO: 81 % — HIGH (ref 13–44)
LYMPHOCYTES # SPEC AUTO: 3 % — HIGH (ref 0–0)
MAGNESIUM SERPL-MCNC: 2.5 MG/DL — SIGNIFICANT CHANGE UP (ref 1.6–2.6)
MCHC RBC-ENTMCNC: 28.5 PG — SIGNIFICANT CHANGE UP (ref 27–34)
MCHC RBC-ENTMCNC: 31.5 GM/DL — LOW (ref 32–36)
MCV RBC AUTO: 90.5 FL — SIGNIFICANT CHANGE UP (ref 80–100)
MONOCYTES # BLD AUTO: 1.06 K/UL — HIGH (ref 0–0.9)
MONOCYTES NFR BLD AUTO: 2 % — SIGNIFICANT CHANGE UP (ref 2–14)
NEUTROPHILS # BLD AUTO: 7.42 K/UL — HIGH (ref 1.8–7.4)
NEUTROPHILS NFR BLD AUTO: 14 % — LOW (ref 43–77)
NRBC # BLD: 2 /100 — HIGH (ref 0–0)
ORGANISM # SPEC MICROSCOPIC CNT: SIGNIFICANT CHANGE UP
ORGANISM # SPEC MICROSCOPIC CNT: SIGNIFICANT CHANGE UP
PHOSPHATE SERPL-MCNC: 2.4 MG/DL — LOW (ref 2.5–4.5)
PLAT MORPH BLD: NORMAL — SIGNIFICANT CHANGE UP
PLATELET # BLD AUTO: 269 K/UL — SIGNIFICANT CHANGE UP (ref 150–400)
POTASSIUM SERPL-MCNC: 3.6 MMOL/L — SIGNIFICANT CHANGE UP (ref 3.5–5.3)
POTASSIUM SERPL-SCNC: 3.6 MMOL/L — SIGNIFICANT CHANGE UP (ref 3.5–5.3)
PROCALCITONIN SERPL-MCNC: 0.24 NG/ML — HIGH (ref 0.02–0.1)
PROT SERPL-MCNC: 6.1 G/DL — SIGNIFICANT CHANGE UP (ref 6–8.3)
RBC # BLD: 4.31 M/UL — SIGNIFICANT CHANGE UP (ref 3.8–5.2)
RBC # FLD: 14.7 % — HIGH (ref 10.3–14.5)
RBC BLD AUTO: SIGNIFICANT CHANGE UP
SODIUM SERPL-SCNC: 138 MMOL/L — SIGNIFICANT CHANGE UP (ref 135–145)
SPECIMEN SOURCE: SIGNIFICANT CHANGE UP
WBC # BLD: 53.02 K/UL — CRITICAL HIGH (ref 3.8–10.5)
WBC # FLD AUTO: 53.02 K/UL — CRITICAL HIGH (ref 3.8–10.5)

## 2020-04-07 PROCEDURE — 99232 SBSQ HOSP IP/OBS MODERATE 35: CPT

## 2020-04-07 RX ADMIN — CARVEDILOL PHOSPHATE 3.12 MILLIGRAM(S): 80 CAPSULE, EXTENDED RELEASE ORAL at 18:01

## 2020-04-07 RX ADMIN — ENOXAPARIN SODIUM 40 MILLIGRAM(S): 100 INJECTION SUBCUTANEOUS at 06:23

## 2020-04-07 RX ADMIN — CARVEDILOL PHOSPHATE 3.12 MILLIGRAM(S): 80 CAPSULE, EXTENDED RELEASE ORAL at 06:23

## 2020-04-07 RX ADMIN — LISINOPRIL 10 MILLIGRAM(S): 2.5 TABLET ORAL at 06:23

## 2020-04-07 RX ADMIN — Medication 100 MILLIGRAM(S): at 18:01

## 2020-04-07 RX ADMIN — ENOXAPARIN SODIUM 40 MILLIGRAM(S): 100 INJECTION SUBCUTANEOUS at 18:01

## 2020-04-07 NOTE — PROGRESS NOTE ADULT - ASSESSMENT
Comfortable on 1L.  Would d/c oxygen and monitor subjectively and POx Comfortable on 1L.  Would d/c oxygen and monitor subjectively and POx    J22 unspecified acute respiratory infection  B97.29 other coronavirus infection

## 2020-04-07 NOTE — PROGRESS NOTE ADULT - SUBJECTIVE AND OBJECTIVE BOX
[facility] Division of Hospital Medicine Progress Note    Patient is a 69y old  Female who presents with a chief complaint of fever, cough (06 Apr 2020 11:07)      SUBJECTIVE / OVERNIGHT EVENTS:  Comfortable on 1L NC.  Still coughing, non-productive.     ADDITIONAL REVIEW OF SYSTEMS:    MEDICATIONS  (STANDING):  carvedilol 3.125 milliGRAM(s) Oral every 12 hours  enoxaparin Injectable 40 milliGRAM(s) SubCutaneous two times a day  lisinopril 10 milliGRAM(s) Oral daily    MEDICATIONS  (PRN):  acetaminophen   Tablet .. 650 milliGRAM(s) Oral every 4 hours PRN Temp greater or equal to 38C (100.4F)  guaiFENesin   Syrup  (Sugar-Free) 100 milliGRAM(s) Oral every 6 hours PRN Cough  hydrocodone/homatropine Syrup 5 milliLiter(s) Oral every 4 hours PRN Cough      CAPILLARY BLOOD GLUCOSE        I&O's Summary      PHYSICAL EXAM:  Vital Signs Last 24 Hrs  T(C): 36.7 (07 Apr 2020 08:47), Max: 37.1 (06 Apr 2020 21:45)  T(F): 98.1 (07 Apr 2020 08:47), Max: 98.8 (06 Apr 2020 21:45)  HR: 81 (07 Apr 2020 08:47) (62 - 81)  BP: 116/72 (07 Apr 2020 08:47) (116/72 - 134/71)  BP(mean): --  RR: 20 (07 Apr 2020 08:47) (18 - 20)  SpO2: 94% (07 Apr 2020 08:47) (94% - 96%)  CONSTITUTIONAL: comfortable  RESPIRATORY: Normal respiratory effort; lungs are clear to auscultation bilaterally    LABS:                        12.3   x     )-----------( 269      ( 07 Apr 2020 09:23 )             39.0     04-06    136  |  93<L>  |  16  ----------------------------<  83  3.4<L>   |  30  |  0.53    Ca    9.2      06 Apr 2020 08:24                Culture - Blood (collected 05 Apr 2020 10:02)  Source: .Blood Blood-Venous  Gram Stain (06 Apr 2020 06:46):    Growth in aerobic bottle: Gram Positive Cocci in Clusters  Preliminary Report (06 Apr 2020 06:46):    Growth in aerobic bottle: Gram Positive Cocci in Clusters    ***Blood Panel PCR results on this specimen are available    approximately 3 hours after the Gram stain result.***    Gram stain, PCR, and/or culture results may not always    correspond due to difference in methodologies.    ************************************************************    This PCR assay was performed using [employer].    The following targets are tested for: Enterococcus,    vancomycin resistant enterococci, Listeria monocytogenes,    coagulase negative staphylococci, S. aureus,    methicillin resistant S. aureus, Streptococcus agalactiae    (Group B), S. pneumoniae, S. pyogenes (Group A),    Acinetobacter baumannii, Enterobacter cloacae, E. coli,    Klebsiella oxytoca, K. pneumoniae, Proteus sp.,    Serratia marcescens, Haemophilus influenzae,    Neisseria meningitidis, Pseudomonas aeruginosa, Candida    albicans, C. glabrata, C krusei, C parapsilosis,    C. tropicalis and the KPC resistance gene.  Organism: Blood Culture PCR (06 Apr 2020 07:53)  Organism: Blood Culture PCR (06 Apr 2020 07:53)    Culture - Blood (collected 05 Apr 2020 10:02)  Source: .Blood Blood-Peripheral  Preliminary Report (06 Apr 2020 11:01):    No growth to date.      COVID-19 PCR: Detected (04-04-20 @ 19:02)      RADIOLOGY & ADDITIONAL TESTS:  Imaging from Last 24 Hours:  Electrocardiogram/QTc Interval:    COORDINATION OF CARE:  Care Discussed with Consultants/Other Providers:

## 2020-04-08 ENCOUNTER — TRANSCRIPTION ENCOUNTER (OUTPATIENT)
Age: 70
End: 2020-04-08

## 2020-04-08 LAB
ALBUMIN SERPL ELPH-MCNC: 3 G/DL — LOW (ref 3.3–5)
ALP SERPL-CCNC: 70 U/L — SIGNIFICANT CHANGE UP (ref 40–120)
ALT FLD-CCNC: 77 U/L — HIGH (ref 10–45)
ANION GAP SERPL CALC-SCNC: 12 MMOL/L — SIGNIFICANT CHANGE UP (ref 5–17)
AST SERPL-CCNC: 55 U/L — HIGH (ref 10–40)
BILIRUB SERPL-MCNC: 0.3 MG/DL — SIGNIFICANT CHANGE UP (ref 0.2–1.2)
BUN SERPL-MCNC: 12 MG/DL — SIGNIFICANT CHANGE UP (ref 7–23)
CALCIUM SERPL-MCNC: 9.1 MG/DL — SIGNIFICANT CHANGE UP (ref 8.4–10.5)
CHLORIDE SERPL-SCNC: 100 MMOL/L — SIGNIFICANT CHANGE UP (ref 96–108)
CO2 SERPL-SCNC: 28 MMOL/L — SIGNIFICANT CHANGE UP (ref 22–31)
CREAT SERPL-MCNC: 0.49 MG/DL — LOW (ref 0.5–1.3)
CRP SERPL-MCNC: 4.09 MG/DL — HIGH (ref 0–0.4)
D DIMER BLD IA.RAPID-MCNC: 445 NG/ML DDU — HIGH
FERRITIN SERPL-MCNC: 425 NG/ML — HIGH (ref 15–150)
GLUCOSE SERPL-MCNC: 97 MG/DL — SIGNIFICANT CHANGE UP (ref 70–99)
HCT VFR BLD CALC: 37.9 % — SIGNIFICANT CHANGE UP (ref 34.5–45)
HGB BLD-MCNC: 12 G/DL — SIGNIFICANT CHANGE UP (ref 11.5–15.5)
LDH SERPL L TO P-CCNC: 260 U/L — HIGH (ref 50–242)
MCHC RBC-ENTMCNC: 28.6 PG — SIGNIFICANT CHANGE UP (ref 27–34)
MCHC RBC-ENTMCNC: 31.7 GM/DL — LOW (ref 32–36)
MCV RBC AUTO: 90.2 FL — SIGNIFICANT CHANGE UP (ref 80–100)
NRBC # BLD: 0 /100 WBCS — SIGNIFICANT CHANGE UP (ref 0–0)
PLATELET # BLD AUTO: 296 K/UL — SIGNIFICANT CHANGE UP (ref 150–400)
POTASSIUM SERPL-MCNC: 4.1 MMOL/L — SIGNIFICANT CHANGE UP (ref 3.5–5.3)
POTASSIUM SERPL-SCNC: 4.1 MMOL/L — SIGNIFICANT CHANGE UP (ref 3.5–5.3)
PROT SERPL-MCNC: 5.8 G/DL — LOW (ref 6–8.3)
RBC # BLD: 4.2 M/UL — SIGNIFICANT CHANGE UP (ref 3.8–5.2)
RBC # FLD: 14.8 % — HIGH (ref 10.3–14.5)
SODIUM SERPL-SCNC: 140 MMOL/L — SIGNIFICANT CHANGE UP (ref 135–145)
WBC # BLD: 52.35 K/UL — CRITICAL HIGH (ref 3.8–10.5)
WBC # FLD AUTO: 52.35 K/UL — CRITICAL HIGH (ref 3.8–10.5)

## 2020-04-08 PROCEDURE — 99232 SBSQ HOSP IP/OBS MODERATE 35: CPT

## 2020-04-08 RX ADMIN — Medication 100 MILLIGRAM(S): at 17:19

## 2020-04-08 RX ADMIN — ENOXAPARIN SODIUM 40 MILLIGRAM(S): 100 INJECTION SUBCUTANEOUS at 05:45

## 2020-04-08 RX ADMIN — Medication 100 MILLIGRAM(S): at 08:37

## 2020-04-08 RX ADMIN — CARVEDILOL PHOSPHATE 3.12 MILLIGRAM(S): 80 CAPSULE, EXTENDED RELEASE ORAL at 17:20

## 2020-04-08 RX ADMIN — ENOXAPARIN SODIUM 40 MILLIGRAM(S): 100 INJECTION SUBCUTANEOUS at 17:20

## 2020-04-08 RX ADMIN — CARVEDILOL PHOSPHATE 3.12 MILLIGRAM(S): 80 CAPSULE, EXTENDED RELEASE ORAL at 05:45

## 2020-04-08 RX ADMIN — LISINOPRIL 10 MILLIGRAM(S): 2.5 TABLET ORAL at 05:45

## 2020-04-08 NOTE — PROGRESS NOTE ADULT - SUBJECTIVE AND OBJECTIVE BOX
[facility] Division of Hospital Medicine Progress Note    Patient is a 69y old  Female who presents with a chief complaint of fever, cough (08 Apr 2020 10:18)      SUBJECTIVE / OVERNIGHT EVENTS: Pt feeling OK except some dry cough - overnight did not tolerate off O2 completely - this PM trying again on RA.  ADDITIONAL REVIEW OF SYSTEMS:    MEDICATIONS  (STANDING):  carvedilol 3.125 milliGRAM(s) Oral every 12 hours  enoxaparin Injectable 40 milliGRAM(s) SubCutaneous two times a day  lisinopril 10 milliGRAM(s) Oral daily    MEDICATIONS  (PRN):  acetaminophen   Tablet .. 650 milliGRAM(s) Oral every 4 hours PRN Temp greater or equal to 38C (100.4F)  guaiFENesin   Syrup  (Sugar-Free) 100 milliGRAM(s) Oral every 6 hours PRN Cough  hydrocodone/homatropine Syrup 5 milliLiter(s) Oral every 4 hours PRN Cough      CAPILLARY BLOOD GLUCOSE        I&O's Summary    07 Apr 2020 07:01  -  08 Apr 2020 07:00  --------------------------------------------------------  IN: 640 mL / OUT: 0 mL / NET: 640 mL    08 Apr 2020 07:01  -  08 Apr 2020 18:47  --------------------------------------------------------  IN: 540 mL / OUT: 0 mL / NET: 540 mL        PHYSICAL EXAM:  Vital Signs Last 24 Hrs  T(C): 37.3 (08 Apr 2020 16:12), Max: 37.3 (08 Apr 2020 16:12)  T(F): 99.1 (08 Apr 2020 16:12), Max: 99.1 (08 Apr 2020 16:12)  HR: 74 (08 Apr 2020 16:12) (65 - 74)  BP: 136/80 (08 Apr 2020 16:12) (126/75 - 136/80)  BP(mean): --  RR: 18 (08 Apr 2020 16:12) (18 - 29)  SpO2: 95% (08 Apr 2020 16:12) (90% - 95%)  CONSTITUTIONAL: NAD, well-developed, well-groomed  ENMT: Moist oral mucosa, no pharyngeal injection or exudates; normal dentition  RESPIRATORY: Normal respiratory effort; lungs are clear to auscultation bilaterally  CARDIOVASCULAR: Regular rate and rhythm, normal S1 and S2, no murmur/rub/gallop; No lower extremity edema; Peripheral pulses are 2+ bilaterally  ABDOMEN: Nontender to palpation, normoactive bowel sounds, no rebound/guarding; No hepatosplenomegaly  PSYCH: A+O to person, place, and time; affect appropriate  NEUROLOGY: CN 2-12 are intact and symmetric; no gross sensory deficits   SKIN: No rashes; no palpable lesions    LABS:                        12.0   52.35 )-----------( 296      ( 08 Apr 2020 06:56 )             37.9     04-08    140  |  100  |  12  ----------------------------<  97  4.1   |  28  |  0.49<L>    Ca    9.1      08 Apr 2020 06:56  Phos  2.4     04-07  Mg     2.5     04-07    TPro  5.8<L>  /  Alb  3.0<L>  /  TBili  0.3  /  DBili  x   /  AST  55<H>  /  ALT  77<H>  /  AlkPhos  70  04-08              COVID-19 PCR: Detected (04-04-20 @ 19:02)      RADIOLOGY & ADDITIONAL TESTS:  Imaging from Last 24 Hours:  Electrocardiogram/QTc Interval:    COORDINATION OF CARE:  Care Discussed with Consultants/Other Providers:

## 2020-04-08 NOTE — DISCHARGE NOTE PROVIDER - HOSPITAL COURSE
69F c hx CLL (stable, asymptomatic, not treated), HTN, pw 1.5 weeks cough, fever, tested and positive for COVID. Pt initially requiring oxygen via nasal cannula but now saturating well on room air, pt remains afebrile. Pt stable for discharge home and must continue to self isolate until 4/18/20. 69F c hx CLL (stable, asymptomatic, not treated), HTN, presented with 1.5 weeks of fever and cough, no SOB. Diagnosed sepsis and hypoxic respiratory failure 2/2 coronavirus pneumonia. Initially required oxygen , now saturating 93% ambulating on room air. Was not treated with plaquinil secondary to CLL, supportive care and oxygen while hospitalized. Ready for discharge. 69F c hx CLL (stable, asymptomatic, not treated), HTN, presented with 1.5 weeks of fever and cough, no SOB. Diagnosed sepsis and hypoxic respiratory failure 2/2 coronavirus pneumonia. Initially required oxygen , now saturating 93% ambulating on room air. Was not treated with plaquinil secondary to CLL, supportive care and oxygen while hospitalized. Ready for discharge. You tested positive for COVID 19.  You no longer require hospitalization.  Complete abx as recommended if prescribed for home. Quarantine yourself for 14 days    Please follow up with your PCP in 1-2 weeks -Call your Provider before hand to make then aware of your hospitalization     Take Tylenol for Fevers every 6 hours as needed- Do not exceed 4gm of Tylenol in a 24 hour period    Stay hydrated     WEAR A FACE MASK     Cover your cough and sneezes     Clean your hands often     Avoid sharing personal house hold items     Clean all high touch surfaces- everyday items like table tops , door knobs, cell phones etc     You should restrict activities outside your home except for getting medical care     Avoid using public transportation    Do not go to work, school, or Public areas     Monitor your oxygen saturation     Call [facility] of health at [phone]

## 2020-04-08 NOTE — DISCHARGE NOTE PROVIDER - PROVIDER TOKENS
PROVIDER:[TOKEN:[[id number]],FOLLOWUP:[1 week]],PROVIDER:[TOKEN:[[id number]],FOLLOWUP:[2 weeks]]

## 2020-04-08 NOTE — DISCHARGE NOTE PROVIDER - NSDCCPCAREPLAN_GEN_ALL_CORE_FT
PRINCIPAL DISCHARGE DIAGNOSIS  Diagnosis: Suspected 2019 novel coronavirus infection  Assessment and Plan of Treatment: You tested positive for COVID 19.  You no longer require hospitalization.  Please restrict activities outside of your home except for getting medical care.  Do not go to work, school, or public areas.  Avoid using public transportation, ride-sharing, or taxis.  Separate yourself from other people and animals in your home.  Call ahead before visiting your doctor.  Wear a facemask when you are around other people. Cover your cough and sneezes.  Clean your hands often.  Avoid sharing personal household items.  Clean all frequently touched surfaces daily.        SECONDARY DISCHARGE DIAGNOSES  Diagnosis: HTN (hypertension)  Assessment and Plan of Treatment: Low salt diet  Activity as tolerated.  Take all medication as prescribed.  Follow up with your medical doctor for routine blood pressure monitoring at your next visit.  Notify your doctor if you have any of the following symptoms:   Dizziness, Lightheadedness, Blurry vision, Headache, Chest pain, Shortness of breath      Diagnosis: CLL (chronic lymphocytic leukemia)  Assessment and Plan of Treatment: F/u with Dr. Renteria

## 2020-04-08 NOTE — PROGRESS NOTE ADULT - ASSESSMENT
Comfortable on 1L.  Would d/c oxygen and monitor subjectively and POx; possible for d/c tomorrow.    J22 unspecified acute respiratory infection  B97.29 other coronavirus infection

## 2020-04-08 NOTE — DISCHARGE NOTE PROVIDER - CARE PROVIDER_API CALL
ALYSIA BRADY  Follow Up Time: 1 week    Barbara Renteria (DO)  Hematology; Medical Oncology  [address] [place] [address]  Phone: [phone]  Fax: [phone]  Follow Up Time: 2 weeks

## 2020-04-08 NOTE — PROVIDER CONTACT NOTE (CRITICAL VALUE NOTIFICATION) - BACKGROUND
Pt admitted with hypoxia, COVID +, Hx CLL
Pt has a h/o CLL
Admitted for hypoxia, COVID +, Hx CLL
COVID +, Hx CLL

## 2020-04-08 NOTE — PROVIDER CONTACT NOTE (CRITICAL VALUE NOTIFICATION) - ACTION/TREATMENT ORDERED:
continue treatment as ordered awaiting blood cx results
Will monitor orders
Awaiting final results on 2nd set, also to be discussed with pt's attending.
NO treatment indicated at this time as pt with Hx CLL, WBC Stable at this time.
No treatment indicated

## 2020-04-08 NOTE — DISCHARGE NOTE PROVIDER - NSDCMRMEDTOKEN_GEN_ALL_CORE_FT
Coreg 3.125 mg oral tablet: 1 tab(s) orally 2 times a day  lisinopril 20 mg oral tablet: 1 tab(s) orally once a day  simvastatin 40 mg oral tablet: 1 tab(s) orally once a day (at bedtime)  Xanax 0.25 mg oral tablet: 1 tab(s) orally once a day, As Needed acetaminophen 325 mg oral tablet: 2 tab(s) orally every 4 hours, As needed, Temp greater or equal to 38C (100.4F) and notify MD Bah 3.125 mg oral tablet: 1 tab(s) orally 2 times a day  guaiFENesin 100 mg/5 mL oral liquid: 5 milliliter(s) orally every 6 hours, As needed, Cough  lisinopril 20 mg oral tablet: 1 tab(s) orally once a day  simvastatin 40 mg oral tablet: 1 tab(s) orally once a day (at bedtime)  Xanax 0.25 mg oral tablet: 1 tab(s) orally once a day, As Needed acetaminophen 325 mg oral tablet: 2 tab(s) orally every 4 hours, As needed, Temp greater or equal to 38C (100.4F) and notify MD  benzonatate 100 mg oral capsule: 1 cap(s) orally 3 times a day   Coreg 3.125 mg oral tablet: 1 tab(s) orally 2 times a day  guaiFENesin 100 mg/5 mL oral liquid: 5 milliliter(s) orally every 6 hours, As needed, Cough  lisinopril 20 mg oral tablet: 1 tab(s) orally once a day  simvastatin 40 mg oral tablet: 1 tab(s) orally once a day (at bedtime)  Xanax 0.25 mg oral tablet: 1 tab(s) orally once a day, As Needed

## 2020-04-09 ENCOUNTER — TRANSCRIPTION ENCOUNTER (OUTPATIENT)
Age: 70
End: 2020-04-09

## 2020-04-09 VITALS
DIASTOLIC BLOOD PRESSURE: 87 MMHG | OXYGEN SATURATION: 94 % | TEMPERATURE: 99 F | RESPIRATION RATE: 18 BRPM | SYSTOLIC BLOOD PRESSURE: 150 MMHG | HEART RATE: 84 BPM

## 2020-04-09 LAB
CRP SERPL-MCNC: 2.49 MG/DL — HIGH (ref 0–0.4)
CULTURE RESULTS: SIGNIFICANT CHANGE UP
FERRITIN SERPL-MCNC: 394 NG/ML — HIGH (ref 15–150)
LDH SERPL L TO P-CCNC: 212 U/L — SIGNIFICANT CHANGE UP (ref 50–242)
SPECIMEN SOURCE: SIGNIFICANT CHANGE UP

## 2020-04-09 RX ORDER — ACETAMINOPHEN 500 MG
2 TABLET ORAL
Qty: 0 | Refills: 0 | DISCHARGE
Start: 2020-04-09

## 2020-04-09 RX ADMIN — ENOXAPARIN SODIUM 40 MILLIGRAM(S): 100 INJECTION SUBCUTANEOUS at 05:28

## 2020-04-09 RX ADMIN — LISINOPRIL 10 MILLIGRAM(S): 2.5 TABLET ORAL at 05:28

## 2020-04-09 RX ADMIN — CARVEDILOL PHOSPHATE 3.12 MILLIGRAM(S): 80 CAPSULE, EXTENDED RELEASE ORAL at 05:28

## 2020-04-09 RX ADMIN — CARVEDILOL PHOSPHATE 3.12 MILLIGRAM(S): 80 CAPSULE, EXTENDED RELEASE ORAL at 16:49

## 2020-04-09 RX ADMIN — Medication 100 MILLIGRAM(S): at 05:28

## 2020-04-09 NOTE — CHART NOTE - NSCHARTNOTEFT_GEN_A_CORE
Nutrition Initial Assessment    Nutrition Consult Received: Yes [   ]  No [  x ]    Reason for Initial Nutrition Assessment: Pt seen for length of stay.     Source of Information: Unable to conduct face to face interview or conduct nutrition focused physical exam at this time due to limited contact restrictions related to their medical condition and isolation precautions. Information obtained from pt via phone.     Admitting Diagnosis: 69y Female admitted for Suspected 2019 novel coronavirus infection    PAST MEDICAL & SURGICAL HISTORY:  Hypercholesterolemia  Hypertension  No significant past surgical history      Subjective Information:  Pt reports decreased appetite for 2 weeks PTA due to generally feeling unwell, pt stated she usually consumes 3 meals per day however her intake was more variable during this time. Pt does not follow any formal dietary restrictions at home.     Weight: pt reports UBW of 145 lbs, noted current admission weight 139.2 lbs indicating ~5 lb, (4%) wt loss within 2 weeks.       GI Issues: pt reports nausea PTA- no episodes of vomiting, reports normal BMs in house. No food allergies, no micronutrient supplementation PTA. Pt with no difficulty chewing/swallowing.     Current Nutrition Order: Diet, DASH/TLC:   Sodium & Cholesterol Restricted (04-04-20 @ 21:53)    PO Intake:   Good (%) [ x  ]    Fair (50-75%) [   ]    Poor (<50%) [   ] Pt reports intake has improved in house within the past few days, stated she has been able to eat >75% of her meals.     Skin Integrity: free of pressure injury, no edema     Labs:   04-08 Na140 mmol/L Glu 97 mg/dL K+ 4.1 mmol/L Cr  0.49 mg/dL<L> BUN 12 mg/dL Phos n/a   Alb 3.0 g/dL<L> PAB n/a             Medications:  MEDICATIONS  (STANDING):  carvedilol 3.125 milliGRAM(s) Oral every 12 hours  enoxaparin Injectable 40 milliGRAM(s) SubCutaneous two times a day  lisinopril 10 milliGRAM(s) Oral daily    MEDICATIONS  (PRN):  acetaminophen   Tablet .. 650 milliGRAM(s) Oral every 4 hours PRN Temp greater or equal to 38C (100.4F)  guaiFENesin   Syrup  (Sugar-Free) 100 milliGRAM(s) Oral every 6 hours PRN Cough  hydrocodone/homatropine Syrup 5 milliLiter(s) Oral every 4 hours PRN Cough    Admitted Anthropometrics:    Height (cm): 165.1 (04-05-20 @ 01:00)  Weight (kg): 63.3 (04-05-20 @ 01:00)  BMI (kg/m2): 23.2 (04-05-20 @ 01:00)    Nutrition Focused Physical Exam: Unable to complete due to limited isolation contact precautions at this time.     Estimated Energy Needs (25kcal/kg- 30 kcal/kg): 5634-6540 Kcal/day   Estimated Protein Needs (1g/kg- 1.2 g/kg): 63-76g protein/day   Based on weight of: 63.2 Kg    [ x ] Nutrition Diagnosis: Inadequate oral intake related to decreased appetite as evidenced by poor po intake x 2 weeks PTA with 4% wt loss-now improved intake in house   [  ] No active nutrition diagnosis at this time  [  ] Current medical condition precludes nutrition intervention    Goal:    Nutrition Interventions:     Recommendations:    1. Continue DASH diet as ordered, monitor po intake for need to liberalize  2. Continue to encourage po intake and obtain/honor food preferences as able   3. Review diet education as needed-discussed HTN nutrition therapy.       RD to follow-up per protocol.    Vanesa Patel R.D., [facility], Pager #[phone]

## 2020-04-09 NOTE — PROGRESS NOTE ADULT - ASSESSMENT
Comfortable on RA.  Stable for D/C.  Able to isolate self at home.  Will follow up with PCP by phone.  Tessalon for dry cough; can use Robitussin if with some residual phlegm.      J22 unspecified acute respiratory infection  B97.29 other coronavirus infection

## 2020-04-09 NOTE — DISCHARGE NOTE NURSING/CASE MANAGEMENT/SOCIAL WORK - PATIENT PORTAL LINK FT
You can access the FollowMyHealth Patient Portal offered by [facility] by registering at the following website: [email or web address]. By joining [facility]’s FollowMyHealth portal, you will also be able to view your health information using other applications (apps) compatible with our system.

## 2020-04-09 NOTE — PROGRESS NOTE ADULT - SUBJECTIVE AND OBJECTIVE BOX
[facility] Division of Hospital Medicine Progress Note    Patient is a 69y old  Female who presents with a chief complaint of fever, cough (08 Apr 2020 18:45)      SUBJECTIVE / OVERNIGHT EVENTS:  Pt seen and examined earlier in day.  Comfortable on RA.  Dry cough only.  No fevers.  ADDITIONAL REVIEW OF SYSTEMS:    MEDICATIONS  (STANDING):  carvedilol 3.125 milliGRAM(s) Oral every 12 hours  enoxaparin Injectable 40 milliGRAM(s) SubCutaneous two times a day  lisinopril 10 milliGRAM(s) Oral daily    MEDICATIONS  (PRN):  acetaminophen   Tablet .. 650 milliGRAM(s) Oral every 4 hours PRN Temp greater or equal to 38C (100.4F)  guaiFENesin   Syrup  (Sugar-Free) 100 milliGRAM(s) Oral every 6 hours PRN Cough  hydrocodone/homatropine Syrup 5 milliLiter(s) Oral every 4 hours PRN Cough      CAPILLARY BLOOD GLUCOSE        I&O's Summary    08 Apr 2020 07:01  -  09 Apr 2020 07:00  --------------------------------------------------------  IN: 540 mL / OUT: 0 mL / NET: 540 mL    09 Apr 2020 07:01  -  09 Apr 2020 15:36  --------------------------------------------------------  IN: 440 mL / OUT: 0 mL / NET: 440 mL        PHYSICAL EXAM:  Vital Signs Last 24 Hrs  T(C): 37.1 (09 Apr 2020 09:05), Max: 37.3 (08 Apr 2020 16:12)  T(F): 98.8 (09 Apr 2020 09:05), Max: 99.1 (08 Apr 2020 16:12)  HR: 72 (09 Apr 2020 09:05) (72 - 74)  BP: 148/81 (09 Apr 2020 09:05) (136/80 - 148/81)  BP(mean): --  RR: 19 (09 Apr 2020 09:05) (18 - 19)  SpO2: 93% (09 Apr 2020 09:05) (93% - 95%)  CONSTITUTIONAL: NAD, well-developed, well-groomed  ENMT: Moist oral mucosa, no pharyngeal injection or exudates; normal dentition  RESPIRATORY: Normal respiratory effort; lungs are clear to auscultation bilaterally  CARDIOVASCULAR: Regular rate and rhythm, normal S1 and S2, no murmur/rub/gallop; No lower extremity edema; Peripheral pulses are 2+ bilaterally  ABDOMEN: Nontender to palpation, normoactive bowel sounds, no rebound/guarding; No hepatosplenomegaly  PSYCH: A+O to person, place, and time; affect appropriate  NEUROLOGY: CN 2-12 are intact and symmetric; no gross sensory deficits   SKIN: No rashes; no palpable lesions    LABS:                        12.0   52.35 )-----------( 296      ( 08 Apr 2020 06:56 )             37.9     04-08    140  |  100  |  12  ----------------------------<  97  4.1   |  28  |  0.49<L>    Ca    9.1      08 Apr 2020 06:56    TPro  5.8<L>  /  Alb  3.0<L>  /  TBili  0.3  /  DBili  x   /  AST  55<H>  /  ALT  77<H>  /  AlkPhos  70  04-08              COVID-19 PCR: Detected (04-04-20 @ 19:02)      RADIOLOGY & ADDITIONAL TESTS:  Imaging from Last 24 Hours:  Electrocardiogram/QTc Interval:    COORDINATION OF CARE:  Care Discussed with Consultants/Other Providers:

## 2020-04-10 LAB — D DIMER BLD IA.RAPID-MCNC: 3196 NG/ML DDU — HIGH

## 2020-04-10 PROCEDURE — 84145 PROCALCITONIN (PCT): CPT

## 2020-04-10 PROCEDURE — 84132 ASSAY OF SERUM POTASSIUM: CPT

## 2020-04-10 PROCEDURE — 85014 HEMATOCRIT: CPT

## 2020-04-10 PROCEDURE — 80048 BASIC METABOLIC PNL TOTAL CA: CPT

## 2020-04-10 PROCEDURE — 87635 SARS-COV-2 COVID-19 AMP PRB: CPT

## 2020-04-10 PROCEDURE — 82803 BLOOD GASES ANY COMBINATION: CPT

## 2020-04-10 PROCEDURE — 87521 HEPATITIS C PROBE&RVRS TRNSC: CPT

## 2020-04-10 PROCEDURE — 82728 ASSAY OF FERRITIN: CPT

## 2020-04-10 PROCEDURE — 82330 ASSAY OF CALCIUM: CPT

## 2020-04-10 PROCEDURE — 82435 ASSAY OF BLOOD CHLORIDE: CPT

## 2020-04-10 PROCEDURE — 84295 ASSAY OF SERUM SODIUM: CPT

## 2020-04-10 PROCEDURE — 87040 BLOOD CULTURE FOR BACTERIA: CPT

## 2020-04-10 PROCEDURE — 86140 C-REACTIVE PROTEIN: CPT

## 2020-04-10 PROCEDURE — 85730 THROMBOPLASTIN TIME PARTIAL: CPT

## 2020-04-10 PROCEDURE — 85610 PROTHROMBIN TIME: CPT

## 2020-04-10 PROCEDURE — 86803 HEPATITIS C AB TEST: CPT

## 2020-04-10 PROCEDURE — 84100 ASSAY OF PHOSPHORUS: CPT

## 2020-04-10 PROCEDURE — 83735 ASSAY OF MAGNESIUM: CPT

## 2020-04-10 PROCEDURE — 93005 ELECTROCARDIOGRAM TRACING: CPT

## 2020-04-10 PROCEDURE — 99285 EMERGENCY DEPT VISIT HI MDM: CPT

## 2020-04-10 PROCEDURE — 80053 COMPREHEN METABOLIC PANEL: CPT

## 2020-04-10 PROCEDURE — 82947 ASSAY GLUCOSE BLOOD QUANT: CPT

## 2020-04-10 PROCEDURE — 82550 ASSAY OF CK (CPK): CPT

## 2020-04-10 PROCEDURE — 87150 DNA/RNA AMPLIFIED PROBE: CPT

## 2020-04-10 PROCEDURE — 71045 X-RAY EXAM CHEST 1 VIEW: CPT

## 2020-04-10 PROCEDURE — 85027 COMPLETE CBC AUTOMATED: CPT

## 2020-04-10 PROCEDURE — 83605 ASSAY OF LACTIC ACID: CPT

## 2020-04-10 PROCEDURE — 83615 LACTATE (LD) (LDH) ENZYME: CPT

## 2020-04-10 PROCEDURE — 85379 FIBRIN DEGRADATION QUANT: CPT

## 2020-04-12 LAB
ALBUMIN SERPL ELPH-MCNC: 4.4 G/DL
ALBUMIN SERPL ELPH-MCNC: 4.4 G/DL
ALBUMIN SERPL ELPH-MCNC: 4.5 G/DL
ALP BLD-CCNC: 74 U/L
ALP BLD-CCNC: 74 U/L
ALP BLD-CCNC: 81 U/L
ALT SERPL-CCNC: 14 U/L
ALT SERPL-CCNC: 15 U/L
ALT SERPL-CCNC: 16 U/L
ANION GAP SERPL CALC-SCNC: 10 MMOL/L
ANION GAP SERPL CALC-SCNC: 12 MMOL/L
ANION GAP SERPL CALC-SCNC: 13 MMOL/L
AST SERPL-CCNC: 17 U/L
AST SERPL-CCNC: 17 U/L
AST SERPL-CCNC: 21 U/L
BILIRUB SERPL-MCNC: 0.2 MG/DL
BILIRUB SERPL-MCNC: 0.2 MG/DL
BILIRUB SERPL-MCNC: 0.3 MG/DL
BUN SERPL-MCNC: 14 MG/DL
BUN SERPL-MCNC: 18 MG/DL
BUN SERPL-MCNC: 18 MG/DL
CALCIUM SERPL-MCNC: 10.2 MG/DL
CALCIUM SERPL-MCNC: 10.5 MG/DL
CALCIUM SERPL-MCNC: 10.7 MG/DL
CHLORIDE SERPL-SCNC: 103 MMOL/L
CHLORIDE SERPL-SCNC: 105 MMOL/L
CHLORIDE SERPL-SCNC: 106 MMOL/L
CO2 SERPL-SCNC: 25 MMOL/L
CO2 SERPL-SCNC: 27 MMOL/L
CO2 SERPL-SCNC: 28 MMOL/L
CREAT SERPL-MCNC: 0.57 MG/DL
CREAT SERPL-MCNC: 0.65 MG/DL
CREAT SERPL-MCNC: 0.87 MG/DL
GLUCOSE SERPL-MCNC: 102 MG/DL
GLUCOSE SERPL-MCNC: 104 MG/DL
GLUCOSE SERPL-MCNC: 119 MG/DL
LDH SERPL-CCNC: 149 U/L
LDH SERPL-CCNC: 163 U/L
LDH SERPL-CCNC: 207 U/L
POTASSIUM SERPL-SCNC: 3.5 MMOL/L
POTASSIUM SERPL-SCNC: 3.8 MMOL/L
POTASSIUM SERPL-SCNC: 3.9 MMOL/L
PROT SERPL-MCNC: 6 G/DL
PROT SERPL-MCNC: 6.3 G/DL
PROT SERPL-MCNC: 6.5 G/DL
SODIUM SERPL-SCNC: 142 MMOL/L
SODIUM SERPL-SCNC: 143 MMOL/L
SODIUM SERPL-SCNC: 144 MMOL/L

## 2020-05-11 ENCOUNTER — APPOINTMENT (OUTPATIENT)
Dept: ULTRASOUND IMAGING | Facility: IMAGING CENTER | Age: 70
End: 2020-05-11

## 2020-05-11 PROBLEM — I10 ESSENTIAL (PRIMARY) HYPERTENSION: Chronic | Status: ACTIVE | Noted: 2020-04-04

## 2020-05-11 PROBLEM — E78.00 PURE HYPERCHOLESTEROLEMIA, UNSPECIFIED: Chronic | Status: ACTIVE | Noted: 2020-04-04

## 2020-06-01 ENCOUNTER — APPOINTMENT (OUTPATIENT)
Dept: HEMATOLOGY ONCOLOGY | Facility: CLINIC | Age: 70
End: 2020-06-01

## 2020-06-16 ENCOUNTER — OUTPATIENT (OUTPATIENT)
Dept: OUTPATIENT SERVICES | Facility: HOSPITAL | Age: 70
LOS: 1 days | Discharge: HOME | End: 2020-06-16

## 2020-06-16 DIAGNOSIS — C91.10 CHRONIC LYMPHOCYTIC LEUKEMIA OF B-CELL TYPE NOT HAVING ACHIEVED REMISSION: ICD-10-CM

## 2020-06-18 ENCOUNTER — APPOINTMENT (OUTPATIENT)
Dept: HEMATOLOGY ONCOLOGY | Facility: CLINIC | Age: 70
End: 2020-06-18
Payer: MEDICARE

## 2020-06-18 PROCEDURE — 99442: CPT | Mod: GT

## 2020-06-18 NOTE — HISTORY OF PRESENT ILLNESS
[de-identified] : CLL- Stage I diagnosed 1/2012, del 13q [Home] : at home, [unfilled] , at the time of the visit. [Medical Office: ([facility])___] : at the medical office located in  [Verbal consent obtained from patient] : the patient, [unfilled] [de-identified] : Patient was admitted to [facility] from 4/4-4/9 for COVID infection.  She was treated with supplemental oxygen and resolved with supportive care.  She has lost 5 pounds from being sick.  She has significant anxiety about leaving her house, getting ill again.  She still has not yet seen a psychiatrist.  \par She has no fever/chills, no cough, no SOB.  She lost her mother in March, had to go to the ER at [facility] where she believes she contracted the COVID.

## 2020-06-18 NOTE — REVIEW OF SYSTEMS
[Fever] : no fever [Chills] : no chills [Night Sweats] : no night sweats [Fatigue] : fatigue [Anxiety] : anxiety [Suicidal] : not suicidal [Recent Change In Weight] : ~T recent weight change [Depression] : depression [Negative] : Allergic/Immunologic

## 2020-06-18 NOTE — ASSESSMENT
[FreeTextEntry1] : 70 year old female with Stage I CLL, del 13q, continues to have stable disease and remains asymptomatic.  \par She has stable axillary lymphadenopathy, no palpable splenomegaly or constitutional symptoms.\par Counts in April when she was admitted with COVID- WBC 52 Hg 12.0 Plt 296.\par No indication for treatment at this time.  \par Plan to repeat labs, along with LFT's in August.  \par Abdominal sonogram after her next visit. \par I have again stressed her to see psychiatry/psychologist.  \par Follow up every 3 months. \par \par \par

## 2020-07-08 ENCOUNTER — APPOINTMENT (OUTPATIENT)
Dept: HEMATOLOGY ONCOLOGY | Facility: CLINIC | Age: 70
End: 2020-07-08

## 2020-09-05 ENCOUNTER — OUTPATIENT (OUTPATIENT)
Dept: OUTPATIENT SERVICES | Facility: HOSPITAL | Age: 70
LOS: 1 days | Discharge: HOME | End: 2020-09-05

## 2020-09-05 DIAGNOSIS — C91.10 CHRONIC LYMPHOCYTIC LEUKEMIA OF B-CELL TYPE NOT HAVING ACHIEVED REMISSION: ICD-10-CM

## 2020-09-10 ENCOUNTER — APPOINTMENT (OUTPATIENT)
Dept: HEMATOLOGY ONCOLOGY | Facility: CLINIC | Age: 70
End: 2020-09-10
Payer: MEDICARE

## 2020-09-10 ENCOUNTER — RESULT REVIEW (OUTPATIENT)
Age: 70
End: 2020-09-10

## 2020-09-10 VITALS
WEIGHT: 149.67 LBS | HEART RATE: 72 BPM | OXYGEN SATURATION: 98 % | RESPIRATION RATE: 16 BRPM | BODY MASS INDEX: 25.56 KG/M2 | TEMPERATURE: 98.5 F | SYSTOLIC BLOOD PRESSURE: 165 MMHG | DIASTOLIC BLOOD PRESSURE: 75 MMHG

## 2020-09-10 LAB
BASOPHILS # BLD AUTO: 0 K/UL — SIGNIFICANT CHANGE UP (ref 0–0.2)
BASOPHILS NFR BLD AUTO: 0 % — SIGNIFICANT CHANGE UP (ref 0–2)
EOSINOPHIL # BLD AUTO: 0 K/UL — SIGNIFICANT CHANGE UP (ref 0–0.5)
EOSINOPHIL NFR BLD AUTO: 0 % — SIGNIFICANT CHANGE UP (ref 0–6)
HCT VFR BLD CALC: 39.6 % — SIGNIFICANT CHANGE UP (ref 34.5–45)
HGB BLD-MCNC: 12.5 G/DL — SIGNIFICANT CHANGE UP (ref 11.5–15.5)
LYMPHOCYTES # BLD AUTO: 59.52 K/UL — HIGH (ref 1–3.3)
LYMPHOCYTES # BLD AUTO: 81 % — HIGH (ref 13–44)
LYMPHOCYTES # SPEC AUTO: 8 % — HIGH (ref 0–0)
MCHC RBC-ENTMCNC: 29.1 PG — SIGNIFICANT CHANGE UP (ref 27–34)
MCHC RBC-ENTMCNC: 31.6 G/DL — LOW (ref 32–36)
MCV RBC AUTO: 92.3 FL — SIGNIFICANT CHANGE UP (ref 80–100)
MONOCYTES # BLD AUTO: 2.94 K/UL — HIGH (ref 0–0.9)
MONOCYTES NFR BLD AUTO: 4 % — SIGNIFICANT CHANGE UP (ref 2–14)
MYELOCYTES NFR BLD: 1 % — HIGH (ref 0–0)
NEUTROPHILS # BLD AUTO: 4.41 K/UL — SIGNIFICANT CHANGE UP (ref 1.8–7.4)
NEUTROPHILS NFR BLD AUTO: 6 % — LOW (ref 43–77)
NRBC # BLD: 0 /100 — SIGNIFICANT CHANGE UP (ref 0–0)
NRBC # BLD: SIGNIFICANT CHANGE UP /100 WBCS (ref 0–0)
PLAT MORPH BLD: NORMAL — SIGNIFICANT CHANGE UP
PLATELET # BLD AUTO: 154 K/UL — SIGNIFICANT CHANGE UP (ref 150–400)
RBC # BLD: 4.29 M/UL — SIGNIFICANT CHANGE UP (ref 3.8–5.2)
RBC # FLD: 14.3 % — SIGNIFICANT CHANGE UP (ref 10.3–14.5)
RBC BLD AUTO: SIGNIFICANT CHANGE UP
WBC # BLD: 73.48 K/UL — CRITICAL HIGH (ref 3.8–10.5)
WBC # FLD AUTO: 73.48 K/UL — CRITICAL HIGH (ref 3.8–10.5)

## 2020-09-10 PROCEDURE — 99213 OFFICE O/P EST LOW 20 MIN: CPT

## 2020-09-10 NOTE — ASSESSMENT
[FreeTextEntry1] : 70 year old female with Stage I CLL, del 13q, continues to have stable disease and remains asymptomatic.  \par She has stable axillary lymphadenopathy, no palpable splenomegaly or constitutional symptoms.\par Her counts are stable.\par No indication for treatment at this time.  \par Check CMP, [facility] today.  \par Encouraged psychiatry/psychologist evaluation.\par Follow up every 3 months. \par \par \par

## 2020-09-10 NOTE — PHYSICAL EXAM
[Fully active, able to carry on all pre-disease performance without restriction] : Status 0 - Fully active, able to carry on all pre-disease performance without restriction [Normal] : affect appropriate [de-identified] : visibly anxious [de-identified] : minimally enlarged right axillary lymph node, 1 cm

## 2020-09-10 NOTE — HISTORY OF PRESENT ILLNESS
[de-identified] : CLL- Stage I diagnosed 1/2012, del 13q [de-identified] : Patient without any new complaints.  She continues to have anxiety, worsened after the COVID infection but has not seeked any help from psychology/psychiatry.  \par She has no night sweats, no weight loss, no fever/chills. She has no cough, no SOB, no abdominal c/o.

## 2020-11-28 ENCOUNTER — OUTPATIENT (OUTPATIENT)
Dept: OUTPATIENT SERVICES | Facility: HOSPITAL | Age: 70
LOS: 1 days | Discharge: HOME | End: 2020-11-28

## 2020-11-28 DIAGNOSIS — C91.10 CHRONIC LYMPHOCYTIC LEUKEMIA OF B-CELL TYPE NOT HAVING ACHIEVED REMISSION: ICD-10-CM

## 2020-12-03 ENCOUNTER — RESULT REVIEW (OUTPATIENT)
Age: 70
End: 2020-12-03

## 2020-12-03 ENCOUNTER — APPOINTMENT (OUTPATIENT)
Dept: HEMATOLOGY ONCOLOGY | Facility: CLINIC | Age: 70
End: 2020-12-03
Payer: MEDICARE

## 2020-12-03 VITALS
BODY MASS INDEX: 26.74 KG/M2 | DIASTOLIC BLOOD PRESSURE: 82 MMHG | OXYGEN SATURATION: 97 % | RESPIRATION RATE: 16 BRPM | HEART RATE: 72 BPM | HEIGHT: 63.5 IN | SYSTOLIC BLOOD PRESSURE: 160 MMHG | TEMPERATURE: 98.2 F | WEIGHT: 152.78 LBS

## 2020-12-03 LAB
BASOPHILS # BLD AUTO: 0 K/UL — SIGNIFICANT CHANGE UP (ref 0–0.2)
BASOPHILS NFR BLD AUTO: 0 % — SIGNIFICANT CHANGE UP (ref 0–2)
EOSINOPHIL # BLD AUTO: 0 K/UL — SIGNIFICANT CHANGE UP (ref 0–0.5)
EOSINOPHIL NFR BLD AUTO: 0 % — SIGNIFICANT CHANGE UP (ref 0–6)
HCT VFR BLD CALC: 40.7 % — SIGNIFICANT CHANGE UP (ref 34.5–45)
HGB BLD-MCNC: 12.6 G/DL — SIGNIFICANT CHANGE UP (ref 11.5–15.5)
LYMPHOCYTES # BLD AUTO: 69.14 K/UL — HIGH (ref 1–3.3)
LYMPHOCYTES # BLD AUTO: 90 % — HIGH (ref 13–44)
LYMPHOCYTES # SPEC AUTO: 6 % — HIGH (ref 0–0)
MCHC RBC-ENTMCNC: 28.8 PG — SIGNIFICANT CHANGE UP (ref 27–34)
MCHC RBC-ENTMCNC: 31 G/DL — LOW (ref 32–36)
MCV RBC AUTO: 93.1 FL — SIGNIFICANT CHANGE UP (ref 80–100)
MONOCYTES # BLD AUTO: 0.77 K/UL — SIGNIFICANT CHANGE UP (ref 0–0.9)
MONOCYTES NFR BLD AUTO: 1 % — LOW (ref 2–14)
NEUTROPHILS # BLD AUTO: 2.3 K/UL — SIGNIFICANT CHANGE UP (ref 1.8–7.4)
NEUTROPHILS NFR BLD AUTO: 3 % — LOW (ref 43–77)
NRBC # BLD: 0 /100 — SIGNIFICANT CHANGE UP (ref 0–0)
NRBC # BLD: SIGNIFICANT CHANGE UP /100 WBCS (ref 0–0)
PLAT MORPH BLD: NORMAL — SIGNIFICANT CHANGE UP
PLATELET # BLD AUTO: 156 K/UL — SIGNIFICANT CHANGE UP (ref 150–400)
RBC # BLD: 4.37 M/UL — SIGNIFICANT CHANGE UP (ref 3.8–5.2)
RBC # FLD: 14.4 % — SIGNIFICANT CHANGE UP (ref 10.3–14.5)
RBC BLD AUTO: SIGNIFICANT CHANGE UP
SMUDGE CELLS # BLD: PRESENT — SIGNIFICANT CHANGE UP
WBC # BLD: 76.82 K/UL — CRITICAL HIGH (ref 3.8–10.5)
WBC # FLD AUTO: 76.82 K/UL — CRITICAL HIGH (ref 3.8–10.5)

## 2020-12-03 PROCEDURE — 99072 ADDL SUPL MATRL&STAF TM PHE: CPT

## 2020-12-03 PROCEDURE — 99213 OFFICE O/P EST LOW 20 MIN: CPT

## 2020-12-04 NOTE — PHYSICAL EXAM
[Fully active, able to carry on all pre-disease performance without restriction] : Status 0 - Fully active, able to carry on all pre-disease performance without restriction [Normal] : affect appropriate [de-identified] : visibly anxious [de-identified] : bilateral enlarged axillary lymph node, around 1 cm

## 2020-12-04 NOTE — HISTORY OF PRESENT ILLNESS
[de-identified] : CLL- Stage I diagnosed 1/2012, del 13q [de-identified] : Patient without any new complaints.  She continues to have anxiety, worsened after the COVID infection but has not seeked any help from psychology/psychiatry.  \par She has no night sweats, no weight loss, no fever/chills. She has no cough, no SOB, no abdominal c/o.

## 2020-12-04 NOTE — REVIEW OF SYSTEMS
[Suicidal] : not suicidal [Anxiety] : anxiety [Depression] : depression [Negative] : Allergic/Immunologic

## 2021-03-08 ENCOUNTER — OUTPATIENT (OUTPATIENT)
Dept: OUTPATIENT SERVICES | Facility: HOSPITAL | Age: 71
LOS: 1 days | Discharge: HOME | End: 2021-03-08

## 2021-03-08 DIAGNOSIS — C91.10 CHRONIC LYMPHOCYTIC LEUKEMIA OF B-CELL TYPE NOT HAVING ACHIEVED REMISSION: ICD-10-CM

## 2021-03-11 ENCOUNTER — RESULT REVIEW (OUTPATIENT)
Age: 71
End: 2021-03-11

## 2021-03-11 ENCOUNTER — APPOINTMENT (OUTPATIENT)
Dept: HEMATOLOGY ONCOLOGY | Facility: CLINIC | Age: 71
End: 2021-03-11
Payer: MEDICARE

## 2021-03-11 VITALS
SYSTOLIC BLOOD PRESSURE: 162 MMHG | BODY MASS INDEX: 27.2 KG/M2 | WEIGHT: 155.42 LBS | HEIGHT: 63.5 IN | TEMPERATURE: 97.3 F | DIASTOLIC BLOOD PRESSURE: 80 MMHG | OXYGEN SATURATION: 98 % | HEART RATE: 75 BPM | RESPIRATION RATE: 16 BRPM

## 2021-03-11 LAB
BASOPHILS # BLD AUTO: 0 K/UL — SIGNIFICANT CHANGE UP (ref 0–0.2)
BASOPHILS NFR BLD AUTO: 0 % — SIGNIFICANT CHANGE UP (ref 0–2)
EOSINOPHIL # BLD AUTO: 0 K/UL — SIGNIFICANT CHANGE UP (ref 0–0.5)
EOSINOPHIL NFR BLD AUTO: 0 % — SIGNIFICANT CHANGE UP (ref 0–6)
HCT VFR BLD CALC: 40.5 % — SIGNIFICANT CHANGE UP (ref 34.5–45)
HGB BLD-MCNC: 12.7 G/DL — SIGNIFICANT CHANGE UP (ref 11.5–15.5)
LYMPHOCYTES # BLD AUTO: 69.2 K/UL — HIGH (ref 1–3.3)
LYMPHOCYTES # BLD AUTO: 92 % — HIGH (ref 13–44)
LYMPHOCYTES # SPEC AUTO: 2 % — HIGH (ref 0–0)
MCHC RBC-ENTMCNC: 28.6 PG — SIGNIFICANT CHANGE UP (ref 27–34)
MCHC RBC-ENTMCNC: 31.4 G/DL — LOW (ref 32–36)
MCV RBC AUTO: 91.2 FL — SIGNIFICANT CHANGE UP (ref 80–100)
MONOCYTES # BLD AUTO: 2.26 K/UL — HIGH (ref 0–0.9)
MONOCYTES NFR BLD AUTO: 3 % — SIGNIFICANT CHANGE UP (ref 2–14)
NEUTROPHILS # BLD AUTO: 2.26 K/UL — SIGNIFICANT CHANGE UP (ref 1.8–7.4)
NEUTROPHILS NFR BLD AUTO: 3 % — LOW (ref 43–77)
NRBC # BLD: 0 /100 — SIGNIFICANT CHANGE UP (ref 0–0)
NRBC # BLD: SIGNIFICANT CHANGE UP /100 WBCS (ref 0–0)
PLAT MORPH BLD: NORMAL — SIGNIFICANT CHANGE UP
PLATELET # BLD AUTO: 143 K/UL — LOW (ref 150–400)
RBC # BLD: 4.44 M/UL — SIGNIFICANT CHANGE UP (ref 3.8–5.2)
RBC # FLD: 14.8 % — HIGH (ref 10.3–14.5)
RBC BLD AUTO: SIGNIFICANT CHANGE UP
SMUDGE CELLS # BLD: PRESENT — SIGNIFICANT CHANGE UP
WBC # BLD: 75.22 K/UL — CRITICAL HIGH (ref 3.8–10.5)
WBC # FLD AUTO: 75.22 K/UL — CRITICAL HIGH (ref 3.8–10.5)

## 2021-03-11 PROCEDURE — 99072 ADDL SUPL MATRL&STAF TM PHE: CPT

## 2021-03-11 PROCEDURE — 99213 OFFICE O/P EST LOW 20 MIN: CPT

## 2021-03-15 NOTE — HISTORY OF PRESENT ILLNESS
[de-identified] : CLL- Stage I diagnosed 1/2012, del 13q [de-identified] : Patient without any new complaints.  She continues to have anxiety.  She received the COVID vaccine, had fevers for 3 days after that have since resolved.  \par She has no night sweats, no weight loss, no fever/chills. She has no cough, no SOB, no abdominal c/o.

## 2021-03-15 NOTE — ASSESSMENT
[FreeTextEntry1] : 70 year old female with Stage I CLL, del 13q, continues to have stable disease and remains asymptomatic.  \par She has stable axillary lymphadenopathy, no palpable splenomegaly or constitutional symptoms.\par Her counts are stable.\par No indication for treatment at this time.  \par Check CMP, [facility] today.  \par Continue to encourage psychiatry/psychologist evaluation which she refuses.\par Follow up every 3 months. \par \par \par

## 2021-03-15 NOTE — REVIEW OF SYSTEMS
[Anxiety] : anxiety [Depression] : depression [Negative] : Allergic/Immunologic [Suicidal] : not suicidal

## 2021-03-15 NOTE — PHYSICAL EXAM
[Fully active, able to carry on all pre-disease performance without restriction] : Status 0 - Fully active, able to carry on all pre-disease performance without restriction [Normal] : affect appropriate [de-identified] : visibly anxious [de-identified] : bilateral enlarged axillary lymph nodes, around 1-2 cm

## 2021-07-08 ENCOUNTER — OUTPATIENT (OUTPATIENT)
Dept: OUTPATIENT SERVICES | Facility: HOSPITAL | Age: 71
LOS: 1 days | Discharge: HOME | End: 2021-07-08

## 2021-07-08 DIAGNOSIS — C91.10 CHRONIC LYMPHOCYTIC LEUKEMIA OF B-CELL TYPE NOT HAVING ACHIEVED REMISSION: ICD-10-CM

## 2021-07-12 ENCOUNTER — APPOINTMENT (OUTPATIENT)
Dept: HEMATOLOGY ONCOLOGY | Facility: CLINIC | Age: 71
End: 2021-07-12
Payer: MEDICARE

## 2021-07-12 ENCOUNTER — RESULT REVIEW (OUTPATIENT)
Age: 71
End: 2021-07-12

## 2021-07-12 VITALS
OXYGEN SATURATION: 98 % | BODY MASS INDEX: 27.17 KG/M2 | WEIGHT: 157.19 LBS | SYSTOLIC BLOOD PRESSURE: 179 MMHG | DIASTOLIC BLOOD PRESSURE: 82 MMHG | TEMPERATURE: 97.3 F | HEIGHT: 63.78 IN | HEART RATE: 72 BPM | RESPIRATION RATE: 16 BRPM

## 2021-07-12 LAB
BASOPHILS # BLD AUTO: 0.15 K/UL — SIGNIFICANT CHANGE UP (ref 0–0.2)
BASOPHILS NFR BLD AUTO: 0.2 % — SIGNIFICANT CHANGE UP (ref 0–2)
EOSINOPHIL # BLD AUTO: 0.09 K/UL — SIGNIFICANT CHANGE UP (ref 0–0.5)
EOSINOPHIL NFR BLD AUTO: 0.1 % — SIGNIFICANT CHANGE UP (ref 0–6)
HCT VFR BLD CALC: 39.9 % — SIGNIFICANT CHANGE UP (ref 34.5–45)
HGB BLD-MCNC: 12.3 G/DL — SIGNIFICANT CHANGE UP (ref 11.5–15.5)
IMM GRANULOCYTES NFR BLD AUTO: 0.2 % — SIGNIFICANT CHANGE UP (ref 0–1.5)
LYMPHOCYTES # BLD AUTO: 83.79 K/UL — HIGH (ref 1–3.3)
LYMPHOCYTES # BLD AUTO: 93.7 % — HIGH (ref 13–44)
LYMPHOCYTES # SPEC AUTO: 2 % — HIGH (ref 0–0)
MCHC RBC-ENTMCNC: 28.6 PG — SIGNIFICANT CHANGE UP (ref 27–34)
MCHC RBC-ENTMCNC: 30.8 G/DL — LOW (ref 32–36)
MCV RBC AUTO: 92.8 FL — SIGNIFICANT CHANGE UP (ref 80–100)
MONOCYTES # BLD AUTO: 2.47 K/UL — HIGH (ref 0–0.9)
MONOCYTES NFR BLD AUTO: 2.8 % — SIGNIFICANT CHANGE UP (ref 2–14)
NEUTROPHILS # BLD AUTO: 2.75 K/UL — SIGNIFICANT CHANGE UP (ref 1.8–7.4)
NEUTROPHILS NFR BLD AUTO: 3 % — LOW (ref 43–77)
NRBC # BLD: 0 /100 WBCS — SIGNIFICANT CHANGE UP (ref 0–0)
PLAT MORPH BLD: NORMAL — SIGNIFICANT CHANGE UP
PLATELET # BLD AUTO: 139 K/UL — LOW (ref 150–400)
RBC # BLD: 4.3 M/UL — SIGNIFICANT CHANGE UP (ref 3.8–5.2)
RBC # FLD: 14.8 % — HIGH (ref 10.3–14.5)
RBC BLD AUTO: SIGNIFICANT CHANGE UP
SMUDGE CELLS # BLD: PRESENT — SIGNIFICANT CHANGE UP
WBC # BLD: 89.41 K/UL — CRITICAL HIGH (ref 3.8–10.5)
WBC # FLD AUTO: 89.41 K/UL — CRITICAL HIGH (ref 3.8–10.5)

## 2021-07-12 PROCEDURE — 99072 ADDL SUPL MATRL&STAF TM PHE: CPT

## 2021-07-12 PROCEDURE — 99213 OFFICE O/P EST LOW 20 MIN: CPT

## 2021-07-19 NOTE — PHYSICAL EXAM
[Fully active, able to carry on all pre-disease performance without restriction] : Status 0 - Fully active, able to carry on all pre-disease performance without restriction [Normal] : affect appropriate [de-identified] : visibly anxious [de-identified] : bilateral enlarged axillary lymph nodes, around 1-2 cm

## 2021-07-19 NOTE — HISTORY OF PRESENT ILLNESS
[de-identified] : CLL- Stage I diagnosed 1/2012, del 13q [de-identified] : Patient without any new complaints.  She continues to have anxiety about many things, especially her medical condition.  She has no night sweats, no weight loss, no fever/chills. She has no cough, no SOB, no abdominal c/o.

## 2021-07-19 NOTE — ASSESSMENT
[FreeTextEntry1] : This is a 71 year old female with Stage I CLL, del 13q, continues to have stable disease and remains asymptomatic.  \par She has stable axillary lymphadenopathy, no palpable splenomegaly or constitutional symptoms.\par Her counts are stable.\par Mild thrombocytopenia since 2017, stable.  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  \par No indication for treatment at this time.  \par Check CMP, [facility] today.  \par Continue to encourage psychiatry/psychologist evaluation which she refuses.\par Follow up every 3 months. \par Discussed that I am leaving the practice, she will follow up with one of my colleagues. \par \par

## 2021-07-20 LAB
ALBUMIN SERPL ELPH-MCNC: 4.6 G/DL
ALP BLD-CCNC: 82 U/L
ALT SERPL-CCNC: 18 U/L
ANION GAP SERPL CALC-SCNC: 11 MMOL/L
AST SERPL-CCNC: 25 U/L
BILIRUB SERPL-MCNC: 0.3 MG/DL
BUN SERPL-MCNC: 15 MG/DL
CALCIUM SERPL-MCNC: 10.3 MG/DL
CHLORIDE SERPL-SCNC: 103 MMOL/L
CO2 SERPL-SCNC: 27 MMOL/L
CREAT SERPL-MCNC: 0.69 MG/DL
GLUCOSE SERPL-MCNC: 132 MG/DL
LDH SERPL-CCNC: 162 U/L
LDH SERPL-CCNC: 190 U/L
POTASSIUM SERPL-SCNC: 3.5 MMOL/L
PROT SERPL-MCNC: 6.2 G/DL
SODIUM SERPL-SCNC: 141 MMOL/L

## 2021-10-19 ENCOUNTER — APPOINTMENT (OUTPATIENT)
Dept: HEMATOLOGY ONCOLOGY | Facility: CLINIC | Age: 71
End: 2021-10-19
Payer: MEDICARE

## 2021-10-19 ENCOUNTER — NON-APPOINTMENT (OUTPATIENT)
Age: 71
End: 2021-10-19

## 2021-10-19 VITALS
BODY MASS INDEX: 26.62 KG/M2 | SYSTOLIC BLOOD PRESSURE: 170 MMHG | WEIGHT: 154 LBS | OXYGEN SATURATION: 98 % | DIASTOLIC BLOOD PRESSURE: 60 MMHG | HEART RATE: 72 BPM

## 2021-10-19 PROCEDURE — 99215 OFFICE O/P EST HI 40 MIN: CPT

## 2021-10-19 PROCEDURE — 85025 COMPLETE CBC W/AUTO DIFF WBC: CPT

## 2021-10-21 ENCOUNTER — APPOINTMENT (OUTPATIENT)
Dept: HEMATOLOGY ONCOLOGY | Facility: CLINIC | Age: 71
End: 2021-10-21

## 2021-10-21 NOTE — RESULTS/DATA
[FreeTextEntry1] : cbc done 10/19/21\par wbc-102.2\par hgb- 13.8\par hct - 42.9\par plt- 171\par alc - 82.41\par anc - 4.60

## 2021-10-21 NOTE — ASSESSMENT
[FreeTextEntry1] : This is a 71 year old female with Stage I CLL, del 13q, continues to have stable disease and remains asymptomatic.  I am concerned about her blood pressure, it is possible it is due anxiety, she needs to f/u w internist.\par \par #CLL\par - CBC reviewed with patient- stable, she is without B sx - no need for treatment currently\par - will refer to psychiatry/psychologist \par - she is due for routine HCM including yearly physical, colonoscopy, derm eval, mammogram. Will refer to Derm\par - f/u in 3 months\par \par

## 2021-10-21 NOTE — HISTORY OF PRESENT ILLNESS
[de-identified] : 70 yo female with hx of CLL- Stage I diagnosed 1/2012, del 13q. Treatment naive [de-identified] : Patient without any new complaints.  She continues to have anxiety about many things, especially her medical condition.  She has no night sweats, no weight loss, no fever/chills. She has no cough, no SOB, no abdominal c/o. Pt had COVID 4/2021. She is fully vaccinated since 5/2021. Pt states she is having trouble sleeping and anxiety due to her diagnosis and is now stressed helping out a family member with dx of MM.

## 2021-10-23 LAB
ALBUMIN SERPL ELPH-MCNC: 4.7 G/DL
ALP BLD-CCNC: 94 U/L
ALT SERPL-CCNC: 18 U/L
ANION GAP SERPL CALC-SCNC: 14 MMOL/L
AST SERPL-CCNC: 20 U/L
BILIRUB SERPL-MCNC: <0.2 MG/DL
BUN SERPL-MCNC: 12 MG/DL
CALCIUM SERPL-MCNC: 10.5 MG/DL
CHLORIDE SERPL-SCNC: 101 MMOL/L
CO2 SERPL-SCNC: 25 MMOL/L
COVID-19 NUCLEOCAPSID  GAM ANTIBODY INTERPRETATION: POSITIVE
COVID-19 SPIKE DOMAIN ANTIBODY INTERPRETATION: POSITIVE
CREAT SERPL-MCNC: 0.62 MG/DL
GLUCOSE SERPL-MCNC: 123 MG/DL
LDH SERPL-CCNC: 184 U/L
MAGNESIUM SERPL-MCNC: 2.1 MG/DL
PHOSPHATE SERPL-MCNC: 2.2 MG/DL
POTASSIUM SERPL-SCNC: 3.7 MMOL/L
PROT SERPL-MCNC: 6.7 G/DL
SARS-COV-2 AB SERPL IA-ACNC: >250 U/ML
SARS-COV-2 AB SERPL QL IA: 7.06 INDEX
SODIUM SERPL-SCNC: 140 MMOL/L
URATE SERPL-MCNC: 4 MG/DL

## 2021-10-28 ENCOUNTER — OUTPATIENT (OUTPATIENT)
Dept: OUTPATIENT SERVICES | Facility: HOSPITAL | Age: 71
LOS: 1 days | Discharge: HOME | End: 2021-10-28

## 2021-10-28 DIAGNOSIS — C91.10 CHRONIC LYMPHOCYTIC LEUKEMIA OF B-CELL TYPE NOT HAVING ACHIEVED REMISSION: ICD-10-CM

## 2021-11-01 ENCOUNTER — APPOINTMENT (OUTPATIENT)
Dept: HEMATOLOGY ONCOLOGY | Facility: CLINIC | Age: 71
End: 2021-11-01

## 2022-02-08 ENCOUNTER — LABORATORY RESULT (OUTPATIENT)
Age: 72
End: 2022-02-08

## 2022-02-09 LAB
ALBUMIN SERPL ELPH-MCNC: 4.6 G/DL
ALP BLD-CCNC: 86 U/L
ALT SERPL-CCNC: 13 U/L
ANION GAP SERPL CALC-SCNC: 13 MMOL/L
AST SERPL-CCNC: 20 U/L
BASOPHILS # BLD AUTO: 0.21 K/UL
BASOPHILS NFR BLD AUTO: 0.2 %
BILIRUB SERPL-MCNC: 0.3 MG/DL
BUN SERPL-MCNC: 16 MG/DL
CALCIUM SERPL-MCNC: 10.7 MG/DL
CHLORIDE SERPL-SCNC: 102 MMOL/L
CO2 SERPL-SCNC: 26 MMOL/L
CREAT SERPL-MCNC: 0.63 MG/DL
EOSINOPHIL # BLD AUTO: 0.07 K/UL
EOSINOPHIL NFR BLD AUTO: 0.1 %
GLUCOSE SERPL-MCNC: 84 MG/DL
HCT VFR BLD CALC: 41.8 %
HGB BLD-MCNC: 12.7 G/DL
IMM GRANULOCYTES NFR BLD AUTO: 0.1 %
LDH SERPL-CCNC: 171 U/L
LYMPHOCYTES # BLD AUTO: 98.61 K/UL
LYMPHOCYTES NFR BLD AUTO: 95.4 %
MAGNESIUM SERPL-MCNC: 2.3 MG/DL
MAN DIFF?: NORMAL
MCHC RBC-ENTMCNC: 28.2 PG
MCHC RBC-ENTMCNC: 30.4 GM/DL
MCV RBC AUTO: 92.9 FL
MONOCYTES # BLD AUTO: 1.32 K/UL
MONOCYTES NFR BLD AUTO: 1.3 %
NEUTROPHILS # BLD AUTO: 3.02 K/UL
NEUTROPHILS NFR BLD AUTO: 2.9 %
PHOSPHATE SERPL-MCNC: 2.7 MG/DL
PLATELET # BLD AUTO: 146 K/UL
POTASSIUM SERPL-SCNC: 3.7 MMOL/L
PROT SERPL-MCNC: 6.3 G/DL
RBC # BLD: 4.5 M/UL
RBC # FLD: 15 %
SODIUM SERPL-SCNC: 141 MMOL/L
URATE SERPL-MCNC: 4.4 MG/DL
WBC # FLD AUTO: 103.35 K/UL

## 2022-02-15 ENCOUNTER — APPOINTMENT (OUTPATIENT)
Dept: HEMATOLOGY ONCOLOGY | Facility: CLINIC | Age: 72
End: 2022-02-15
Payer: MEDICARE

## 2022-02-15 VITALS
WEIGHT: 154 LBS | HEART RATE: 84 BPM | BODY MASS INDEX: 26.62 KG/M2 | OXYGEN SATURATION: 98 % | TEMPERATURE: 98.5 F | RESPIRATION RATE: 16 BRPM | DIASTOLIC BLOOD PRESSURE: 70 MMHG | SYSTOLIC BLOOD PRESSURE: 163 MMHG

## 2022-02-15 PROCEDURE — 99213 OFFICE O/P EST LOW 20 MIN: CPT | Mod: GC

## 2022-02-15 NOTE — ASSESSMENT
[FreeTextEntry1] : This is a 71 year old female with Stage I CLL, del 13q, continues to have stable disease and remains asymptomatic. \par #CLL\par - CBC reviewed with patient- stable, she is without B sx - no need for treatment currently\par -needs to follow up with PCP for better continuity of care for her elevated BP, likely due to stress and anxiety\par - will send new referral to psychiatry/psychologist \par - she is due for routine HCM including yearly physical, colonoscopy, derm eval, mammogram. Will refer to Derm\par - f/u in 3 months, sooner PRN\par \par

## 2022-02-15 NOTE — HISTORY OF PRESENT ILLNESS
[de-identified] : 72 yo female with hx of CLL- Stage I diagnosed 1/2012, del 13q. Treatment naive [de-identified] : 2/15/22: Patient without any new complaints.  She continues to have anxiety about many things, especially her medical condition.  She has no night sweats, no weight loss, no fever/chills. She has no cough, no SOB, no abdominal c/o. Pt had COVID 4/2021. She is fully vaccinated since 5/2021. Pt states she is having trouble sleeping and anxiety due to her diagnosis and is now stressed helping out a family member with dx of MM. She has yet to follow up with her PCP and dermatology. She is still thinking about psychotherapy and will like a new referral.

## 2022-02-15 NOTE — REVIEW OF SYSTEMS
[Anxiety] : anxiety [Depression] : depression [Negative] : Allergic/Immunologic [Insomnia] : insomnia [Suicidal] : not suicidal

## 2022-02-15 NOTE — RESULTS/DATA
[FreeTextEntry1] : cbc done 2/8/22\par wbc-103.35\par hgb- 12.7\par hct - 41.8\par plt- 146\par alc - 98.61\par anc - 3.02

## 2022-02-15 NOTE — END OF VISIT
[FreeTextEntry3] : Patient seen and case discussed with ABNER Valdez. I agree with above and have edited the note where needed.\par

## 2022-04-18 ENCOUNTER — NON-APPOINTMENT (OUTPATIENT)
Age: 72
End: 2022-04-18

## 2022-05-10 ENCOUNTER — APPOINTMENT (OUTPATIENT)
Dept: HEMATOLOGY ONCOLOGY | Facility: CLINIC | Age: 72
End: 2022-05-10
Payer: MEDICARE

## 2022-05-10 VITALS
RESPIRATION RATE: 16 BRPM | DIASTOLIC BLOOD PRESSURE: 75 MMHG | OXYGEN SATURATION: 99 % | HEART RATE: 64 BPM | TEMPERATURE: 97.5 F | WEIGHT: 150 LBS | BODY MASS INDEX: 25.93 KG/M2 | SYSTOLIC BLOOD PRESSURE: 140 MMHG

## 2022-05-10 PROCEDURE — 85025 COMPLETE CBC W/AUTO DIFF WBC: CPT | Mod: GC

## 2022-05-10 PROCEDURE — 99214 OFFICE O/P EST MOD 30 MIN: CPT | Mod: GC,25

## 2022-05-10 NOTE — END OF VISIT
[Time Spent: ___ minutes] : I have spent [unfilled] minutes of time on the encounter. [FreeTextEntry3] : Patient seen and case discussed with ABNER Valdez. I agree with above and have edited the note where needed.\par

## 2022-05-10 NOTE — HISTORY OF PRESENT ILLNESS
[de-identified] : 70 yo female with hx of CLL- Stage I diagnosed 1/2012, del 13q. Treatment naive [de-identified] : 5/10/22: Patient without any new complaints.  She continues to have anxiety about many things, especially her medical condition.  She has no night sweats, no weight loss, no fever/chills. She has no cough, no SOB, no abdominal c/o. Pt had COVID 4/2021. She is fully vaccinated since 5/2021. Pt states she is having trouble sleeping and anxiety due to her diagnosis. She has yet to follow up with her PCP and dermatology. She is still thinking about psychotherapy.

## 2022-05-10 NOTE — RESULTS/DATA
[FreeTextEntry1] : cbc done 5/10/22:\par wbc-113.0\par hgb- 13.3\par hct - 40.9\par plt- 139.0\par alc - 89.63\par anc - 4.86

## 2022-05-10 NOTE — ASSESSMENT
[FreeTextEntry1] : This is a 71 year old female with Stage I CLL, del 13q, continues to have stable disease and remains asymptomatic. \par #CLL\par - CBC reviewed with patient- stable, she is without B sx - no need for treatment currently\par -needs to follow up with PCP for better continuity of care for her elevated BP, likely due to stress and anxiety\par - we have sent referral for psychiatry/psychologist-patient currently considering it\par - she is due for routine HCM including yearly physical, colonoscopy, derm eval, mammogram.\par - f/u in 3 months, sooner PRN\par \par

## 2022-05-11 LAB
ALBUMIN SERPL ELPH-MCNC: 4.6 G/DL
ALP BLD-CCNC: 83 U/L
ALT SERPL-CCNC: 15 U/L
ANION GAP SERPL CALC-SCNC: 15 MMOL/L
AST SERPL-CCNC: 20 U/L
BILIRUB SERPL-MCNC: 0.2 MG/DL
BUN SERPL-MCNC: 15 MG/DL
CALCIUM SERPL-MCNC: 10.6 MG/DL
CHLORIDE SERPL-SCNC: 104 MMOL/L
CO2 SERPL-SCNC: 26 MMOL/L
CREAT SERPL-MCNC: 0.6 MG/DL
EGFR: 96 ML/MIN/1.73M2
GLUCOSE SERPL-MCNC: 106 MG/DL
LDH SERPL-CCNC: 159 U/L
MAGNESIUM SERPL-MCNC: 2.2 MG/DL
PHOSPHATE SERPL-MCNC: 2.4 MG/DL
POTASSIUM SERPL-SCNC: 3.6 MMOL/L
PROT SERPL-MCNC: 6.4 G/DL
SODIUM SERPL-SCNC: 145 MMOL/L
URATE SERPL-MCNC: 3.8 MG/DL

## 2022-08-11 ENCOUNTER — INPATIENT (INPATIENT)
Facility: HOSPITAL | Age: 72
LOS: 3 days | Discharge: HOME | DRG: 871 | End: 2022-08-15
Attending: INTERNAL MEDICINE | Admitting: INTERNAL MEDICINE
Payer: MEDICARE

## 2022-08-11 VITALS
TEMPERATURE: 102 F | HEIGHT: 65 IN | RESPIRATION RATE: 26 BRPM | OXYGEN SATURATION: 97 % | SYSTOLIC BLOOD PRESSURE: 158 MMHG | WEIGHT: 130.07 LBS | DIASTOLIC BLOOD PRESSURE: 75 MMHG | HEART RATE: 90 BPM

## 2022-08-11 DIAGNOSIS — R50.9 FEVER, UNSPECIFIED: ICD-10-CM

## 2022-08-11 LAB
ALBUMIN SERPL ELPH-MCNC: 4 G/DL — SIGNIFICANT CHANGE UP (ref 3.3–5)
ALP SERPL-CCNC: 84 U/L — SIGNIFICANT CHANGE UP (ref 40–120)
ALT FLD-CCNC: 15 U/L — SIGNIFICANT CHANGE UP (ref 10–45)
ANION GAP SERPL CALC-SCNC: 14 MMOL/L — SIGNIFICANT CHANGE UP (ref 5–17)
APPEARANCE UR: CLEAR — SIGNIFICANT CHANGE UP
APTT BLD: 29.8 SEC — SIGNIFICANT CHANGE UP (ref 27.5–35.5)
AST SERPL-CCNC: 19 U/L — SIGNIFICANT CHANGE UP (ref 10–40)
BACTERIA # UR AUTO: 0 — SIGNIFICANT CHANGE UP
BASE EXCESS BLDV CALC-SCNC: 2.1 MMOL/L — HIGH (ref -2–2)
BASOPHILS # BLD AUTO: 0 K/UL — SIGNIFICANT CHANGE UP (ref 0–0.2)
BASOPHILS NFR BLD AUTO: 0 % — SIGNIFICANT CHANGE UP (ref 0–2)
BILIRUB SERPL-MCNC: 0.6 MG/DL — SIGNIFICANT CHANGE UP (ref 0.2–1.2)
BILIRUB UR-MCNC: NEGATIVE — SIGNIFICANT CHANGE UP
BUN SERPL-MCNC: 12 MG/DL — SIGNIFICANT CHANGE UP (ref 7–23)
CA-I SERPL-SCNC: 1.23 MMOL/L — SIGNIFICANT CHANGE UP (ref 1.15–1.33)
CALCIUM SERPL-MCNC: 9.9 MG/DL — SIGNIFICANT CHANGE UP (ref 8.4–10.5)
CHLORIDE BLDV-SCNC: 102 MMOL/L — SIGNIFICANT CHANGE UP (ref 96–108)
CHLORIDE SERPL-SCNC: 100 MMOL/L — SIGNIFICANT CHANGE UP (ref 96–108)
CO2 BLDV-SCNC: 29 MMOL/L — HIGH (ref 22–26)
CO2 SERPL-SCNC: 25 MMOL/L — SIGNIFICANT CHANGE UP (ref 22–31)
COLOR SPEC: YELLOW — SIGNIFICANT CHANGE UP
CREAT SERPL-MCNC: 0.65 MG/DL — SIGNIFICANT CHANGE UP (ref 0.5–1.3)
DIFF PNL FLD: NEGATIVE — SIGNIFICANT CHANGE UP
EGFR: 94 ML/MIN/1.73M2 — SIGNIFICANT CHANGE UP
EOSINOPHIL # BLD AUTO: 0 K/UL — SIGNIFICANT CHANGE UP (ref 0–0.5)
EOSINOPHIL NFR BLD AUTO: 0 % — SIGNIFICANT CHANGE UP (ref 0–6)
EPI CELLS # UR: 3 /HPF — SIGNIFICANT CHANGE UP
GAS PNL BLDV: 134 MMOL/L — LOW (ref 136–145)
GAS PNL BLDV: SIGNIFICANT CHANGE UP
GAS PNL BLDV: SIGNIFICANT CHANGE UP
GLUCOSE BLDV-MCNC: 123 MG/DL — HIGH (ref 70–99)
GLUCOSE SERPL-MCNC: 125 MG/DL — HIGH (ref 70–99)
GLUCOSE UR QL: NEGATIVE — SIGNIFICANT CHANGE UP
HCO3 BLDV-SCNC: 27 MMOL/L — SIGNIFICANT CHANGE UP (ref 22–29)
HCT VFR BLD CALC: 37 % — SIGNIFICANT CHANGE UP (ref 34.5–45)
HCT VFR BLDA CALC: 36 % — SIGNIFICANT CHANGE UP (ref 34.5–46.5)
HGB BLD CALC-MCNC: 12 G/DL — SIGNIFICANT CHANGE UP (ref 11.7–16.1)
HGB BLD-MCNC: 11.5 G/DL — SIGNIFICANT CHANGE UP (ref 11.5–15.5)
HYALINE CASTS # UR AUTO: 5 /LPF — HIGH (ref 0–2)
INR BLD: 1.05 RATIO — SIGNIFICANT CHANGE UP (ref 0.88–1.16)
KETONES UR-MCNC: ABNORMAL
LACTATE BLDV-MCNC: 1.2 MMOL/L — SIGNIFICANT CHANGE UP (ref 0.7–2)
LEUKOCYTE ESTERASE UR-ACNC: NEGATIVE — SIGNIFICANT CHANGE UP
LYMPHOCYTES # BLD AUTO: 100.82 K/UL — HIGH (ref 1–3.3)
LYMPHOCYTES # BLD AUTO: 89 % — HIGH (ref 13–44)
LYMPHOCYTES # SPEC AUTO: 4 % — HIGH (ref 0–0)
MANUAL SMEAR VERIFICATION: SIGNIFICANT CHANGE UP
MCHC RBC-ENTMCNC: 29 PG — SIGNIFICANT CHANGE UP (ref 27–34)
MCHC RBC-ENTMCNC: 31.1 GM/DL — LOW (ref 32–36)
MCV RBC AUTO: 93.4 FL — SIGNIFICANT CHANGE UP (ref 80–100)
MONOCYTES # BLD AUTO: 2.27 K/UL — HIGH (ref 0–0.9)
MONOCYTES NFR BLD AUTO: 2 % — SIGNIFICANT CHANGE UP (ref 2–14)
NEUTROPHILS # BLD AUTO: 5.66 K/UL — SIGNIFICANT CHANGE UP (ref 1.8–7.4)
NEUTROPHILS NFR BLD AUTO: 5 % — LOW (ref 43–77)
NITRITE UR-MCNC: NEGATIVE — SIGNIFICANT CHANGE UP
NRBC # BLD: 0 /100 — SIGNIFICANT CHANGE UP (ref 0–0)
PCO2 BLDV: 44 MMHG — HIGH (ref 39–42)
PH BLDV: 7.4 — SIGNIFICANT CHANGE UP (ref 7.32–7.43)
PH UR: 6 — SIGNIFICANT CHANGE UP (ref 5–8)
PLAT MORPH BLD: NORMAL — SIGNIFICANT CHANGE UP
PLATELET # BLD AUTO: 174 K/UL — SIGNIFICANT CHANGE UP (ref 150–400)
PO2 BLDV: 26 MMHG — SIGNIFICANT CHANGE UP (ref 25–45)
POTASSIUM BLDV-SCNC: 3.2 MMOL/L — LOW (ref 3.5–5.1)
POTASSIUM SERPL-MCNC: 3.6 MMOL/L — SIGNIFICANT CHANGE UP (ref 3.5–5.3)
POTASSIUM SERPL-SCNC: 3.6 MMOL/L — SIGNIFICANT CHANGE UP (ref 3.5–5.3)
PROT SERPL-MCNC: 6.8 G/DL — SIGNIFICANT CHANGE UP (ref 6–8.3)
PROT UR-MCNC: ABNORMAL
PROTHROM AB SERPL-ACNC: 12.2 SEC — SIGNIFICANT CHANGE UP (ref 10.5–13.4)
RAPID RVP RESULT: SIGNIFICANT CHANGE UP
RBC # BLD: 3.96 M/UL — SIGNIFICANT CHANGE UP (ref 3.8–5.2)
RBC # FLD: 14.7 % — HIGH (ref 10.3–14.5)
RBC BLD AUTO: SIGNIFICANT CHANGE UP
RBC CASTS # UR COMP ASSIST: 6 /HPF — HIGH (ref 0–4)
SAO2 % BLDV: 39.3 % — LOW (ref 67–88)
SARS-COV-2 RNA SPEC QL NAA+PROBE: SIGNIFICANT CHANGE UP
SODIUM SERPL-SCNC: 139 MMOL/L — SIGNIFICANT CHANGE UP (ref 135–145)
SP GR SPEC: 1.04 — HIGH (ref 1.01–1.02)
UROBILINOGEN FLD QL: NEGATIVE — SIGNIFICANT CHANGE UP
WBC # BLD: 113.28 K/UL — CRITICAL HIGH (ref 3.8–10.5)
WBC # FLD AUTO: 113.28 K/UL — CRITICAL HIGH (ref 3.8–10.5)
WBC UR QL: 2 /HPF — SIGNIFICANT CHANGE UP (ref 0–5)

## 2022-08-11 PROCEDURE — 71250 CT THORAX DX C-: CPT | Mod: 26

## 2022-08-11 PROCEDURE — 71045 X-RAY EXAM CHEST 1 VIEW: CPT | Mod: 26

## 2022-08-11 PROCEDURE — 99285 EMERGENCY DEPT VISIT HI MDM: CPT

## 2022-08-11 PROCEDURE — 85060 BLOOD SMEAR INTERPRETATION: CPT

## 2022-08-11 PROCEDURE — 93010 ELECTROCARDIOGRAM REPORT: CPT

## 2022-08-11 RX ORDER — IPRATROPIUM/ALBUTEROL SULFATE 18-103MCG
3 AEROSOL WITH ADAPTER (GRAM) INHALATION ONCE
Refills: 0 | Status: COMPLETED | OUTPATIENT
Start: 2022-08-11 | End: 2022-08-12

## 2022-08-11 RX ORDER — CARVEDILOL PHOSPHATE 80 MG/1
1 CAPSULE, EXTENDED RELEASE ORAL
Qty: 0 | Refills: 0 | DISCHARGE

## 2022-08-11 RX ORDER — CEFEPIME 1 G/1
1000 INJECTION, POWDER, FOR SOLUTION INTRAMUSCULAR; INTRAVENOUS EVERY 12 HOURS
Refills: 0 | Status: DISCONTINUED | OUTPATIENT
Start: 2022-08-11 | End: 2022-08-12

## 2022-08-11 RX ORDER — VANCOMYCIN HCL 1 G
1000 VIAL (EA) INTRAVENOUS ONCE
Refills: 0 | Status: COMPLETED | OUTPATIENT
Start: 2022-08-11 | End: 2022-08-11

## 2022-08-11 RX ORDER — LISINOPRIL 2.5 MG/1
1 TABLET ORAL
Qty: 0 | Refills: 0 | DISCHARGE

## 2022-08-11 RX ORDER — SIMVASTATIN 20 MG/1
1 TABLET, FILM COATED ORAL
Qty: 0 | Refills: 0 | DISCHARGE

## 2022-08-11 RX ORDER — SODIUM CHLORIDE 9 MG/ML
1000 INJECTION INTRAMUSCULAR; INTRAVENOUS; SUBCUTANEOUS ONCE
Refills: 0 | Status: COMPLETED | OUTPATIENT
Start: 2022-08-11 | End: 2022-08-11

## 2022-08-11 RX ORDER — LISINOPRIL 2.5 MG/1
20 TABLET ORAL DAILY
Refills: 0 | Status: DISCONTINUED | OUTPATIENT
Start: 2022-08-11 | End: 2022-08-15

## 2022-08-11 RX ORDER — ACETAMINOPHEN 500 MG
975 TABLET ORAL ONCE
Refills: 0 | Status: COMPLETED | OUTPATIENT
Start: 2022-08-11 | End: 2022-08-11

## 2022-08-11 RX ORDER — CEFEPIME 1 G/1
1000 INJECTION, POWDER, FOR SOLUTION INTRAMUSCULAR; INTRAVENOUS ONCE
Refills: 0 | Status: COMPLETED | OUTPATIENT
Start: 2022-08-11 | End: 2022-08-11

## 2022-08-11 RX ORDER — ALPRAZOLAM 0.25 MG
1 TABLET ORAL
Qty: 0 | Refills: 0 | DISCHARGE

## 2022-08-11 RX ORDER — LISINOPRIL/HYDROCHLOROTHIAZIDE 10-12.5 MG
1 TABLET ORAL
Qty: 0 | Refills: 0 | DISCHARGE

## 2022-08-11 RX ORDER — ACETAMINOPHEN 500 MG
650 TABLET ORAL EVERY 6 HOURS
Refills: 0 | Status: DISCONTINUED | OUTPATIENT
Start: 2022-08-11 | End: 2022-08-15

## 2022-08-11 RX ORDER — SIMVASTATIN 20 MG/1
40 TABLET, FILM COATED ORAL AT BEDTIME
Refills: 0 | Status: DISCONTINUED | OUTPATIENT
Start: 2022-08-11 | End: 2022-08-15

## 2022-08-11 RX ORDER — ENOXAPARIN SODIUM 100 MG/ML
40 INJECTION SUBCUTANEOUS EVERY 24 HOURS
Refills: 0 | Status: DISCONTINUED | OUTPATIENT
Start: 2022-08-11 | End: 2022-08-15

## 2022-08-11 RX ORDER — ASCORBIC ACID 60 MG
1 TABLET,CHEWABLE ORAL
Qty: 0 | Refills: 0 | DISCHARGE

## 2022-08-11 RX ORDER — CARVEDILOL PHOSPHATE 80 MG/1
3.12 CAPSULE, EXTENDED RELEASE ORAL EVERY 12 HOURS
Refills: 0 | Status: DISCONTINUED | OUTPATIENT
Start: 2022-08-11 | End: 2022-08-15

## 2022-08-11 RX ADMIN — SIMVASTATIN 40 MILLIGRAM(S): 20 TABLET, FILM COATED ORAL at 23:12

## 2022-08-11 RX ADMIN — CARVEDILOL PHOSPHATE 3.12 MILLIGRAM(S): 80 CAPSULE, EXTENDED RELEASE ORAL at 23:12

## 2022-08-11 RX ADMIN — CEFEPIME 100 MILLIGRAM(S): 1 INJECTION, POWDER, FOR SOLUTION INTRAMUSCULAR; INTRAVENOUS at 17:49

## 2022-08-11 RX ADMIN — Medication 250 MILLIGRAM(S): at 18:34

## 2022-08-11 RX ADMIN — Medication 650 MILLIGRAM(S): at 23:31

## 2022-08-11 RX ADMIN — SODIUM CHLORIDE 1000 MILLILITER(S): 9 INJECTION INTRAMUSCULAR; INTRAVENOUS; SUBCUTANEOUS at 17:21

## 2022-08-11 RX ADMIN — Medication 650 MILLIGRAM(S): at 23:01

## 2022-08-11 RX ADMIN — Medication 975 MILLIGRAM(S): at 17:19

## 2022-08-11 RX ADMIN — Medication 975 MILLIGRAM(S): at 17:49

## 2022-08-11 NOTE — H&P ADULT - NSHPPHYSICALEXAM_GEN_ALL_CORE
PHYSICAL EXAMINATION:  Vital Signs Last 24 Hrs  T(C): 38.9 (11 Aug 2022 16:31), Max: 38.9 (11 Aug 2022 16:31)  T(F): 102.1 (11 Aug 2022 16:31), Max: 102.1 (11 Aug 2022 16:31)  HR: 74 (11 Aug 2022 18:20) (74 - 90)  BP: 114/53 (11 Aug 2022 18:20) (114/53 - 158/75)  BP(mean): --  RR: 22 (11 Aug 2022 18:20) (22 - 26)  SpO2: 97% (11 Aug 2022 18:20) (97% - 98%)    Parameters below as of 11 Aug 2022 18:20  Patient On (Oxygen Delivery Method): room air      CAPILLARY BLOOD GLUCOSE          GENERAL: NAD, well-groomed, well-developed  HEAD:  atraumatic, normocephalic  EYES: sclera anicteric  ENMT: mucous membranes moist  NECK: supple, No JVD  CHEST/LUNG: clear to auscultation bilaterally; no rales, rhonchi, or wheezing b/l  HEART: normal S1, S2  ABDOMEN: BS+, soft, ND, NT   EXTREMITIES:  pulses palpable; no clubbing, cyanosis, or edema b/l LEs  NEURO: awake, alert, interactive; moves all extremities  SKIN: no rashes or lesions

## 2022-08-11 NOTE — ED PROVIDER NOTE - PHYSICAL EXAMINATION
GENERAL: NAD  HEENT:  Atraumatic  CHEST/LUNG: Chest rise equal bilaterally  HEART: Regular rate and rhythm  ABDOMEN: Soft, Nontender, Nondistended  EXTREMITIES:  Extremities warm  PSYCH: A&Ox3  SKIN: No obvious rashes or lesions   NEUROLOGY: strength and sensation intact in all extremities. Ambulatory w/o difficulty.

## 2022-08-11 NOTE — H&P ADULT - ASSESSMENT
72 f with    Sepsis/ Possible Pneumonia  - panculture  - antibiotic  - ID evaluation  - CT chest     CLL  - Hem Onc eval called    HTN control    Hypercholesterolemia   - stable    DVT prophylaxis    Further action as per clinical course     Clayton Wills MD phone [phone]

## 2022-08-11 NOTE — H&P ADULT - NSHPREVIEWOFSYSTEMS_GEN_ALL_CORE
REVIEW OF SYSTEMS:    CONSTITUTIONAL: + weakness, +fevers + chills  EYES/ENT: No visual changes;  No vertigo or throat pain   NECK: No pain or stiffness  RESPIRATORY: No cough, wheezing, hemoptysis; No shortness of breath  CARDIOVASCULAR: No chest pain or palpitations  GASTROINTESTINAL: No abdominal or epigastric pain. No nausea, vomiting, or hematemesis; No diarrhea or constipation. No melena or hematochezia.  GENITOURINARY: No dysuria, frequency or hematuria  NEUROLOGICAL: No numbness or weakness  SKIN: No itching, burning, rashes, or lesions   All other review of systems is negative unless indicated above.

## 2022-08-11 NOTE — CHART NOTE - NSCHARTNOTEFT_GEN_A_CORE
Medicine NP note    CC: Chest pain   Notified by RN that patient c/o CP  Evaluated the patient at bedside.  Patient lying in bed, pleasant, in no distress  states that had CP briefly,  worsens with breathing, better after xylenol,   chest pain is resolved after Tylenol, mild in nature, non radiating  denies HA, SOB, Dizziness, fever, chills    Vital Signs Last 24 Hrs  T(C): 39.4 (11 Aug 2022 22:55), Max: 39.4 (11 Aug 2022 22:55)  T(F): 102.9 (11 Aug 2022 22:55), Max: 102.9 (11 Aug 2022 22:55)  HR: 83 (11 Aug 2022 22:55) (74 - 90)  BP: 135/54 (11 Aug 2022 22:55) (114/53 - 158/75)  BP(mean): 75 (11 Aug 2022 22:55) (73 - 75)  RR: 20 (11 Aug 2022 22:55) (20 - 26)  SpO2: 96% (11 Aug 2022 22:55) (96% - 99%)    Parameters below as of 11 Aug 2022 22:55  Patient On (Oxygen Delivery Method): room air      A/P  73 y/o female w/ PMH HTN and HLD c/o 3 day history of worsening fever and urinary frequency and 1 day history of chest pain and SOB that has resolved (no longer admitting to chest pain and SOB). Admitted for nausea, dysuria   Fever Possible Pneumonia, ON CEFIPIME    #CP  Symptoms consistent with PNA  Not hypoxic  12 LEAD ekg with SR. No acute ST/T changes  Stat trop ordered  Duoneb x1   C/W tele  Will continue to monitor  Will sign out to covering provider    Lexi Garcia FNP-BC, AGAP-BC  [id number]. Medicine NP note    CC: Chest pain   Notified by RN that patient c/o CP  Evaluated the patient at bedside.  Patient lying in bed, pleasant, in no distress  states that had CP briefly,  worsens with breathing, better after xylenol,   chest pain is resolved after Tylenol, mild in nature, non radiating  denies HA, SOB, Dizziness, fever, chills    Vital Signs Last 24 Hrs  T(C): 39.4 (11 Aug 2022 22:55), Max: 39.4 (11 Aug 2022 22:55)  T(F): 102.9 (11 Aug 2022 22:55), Max: 102.9 (11 Aug 2022 22:55)  HR: 83 (11 Aug 2022 22:55) (74 - 90)  BP: 135/54 (11 Aug 2022 22:55) (114/53 - 158/75)  BP(mean): 75 (11 Aug 2022 22:55) (73 - 75)  RR: 20 (11 Aug 2022 22:55) (20 - 26)  SpO2: 96% (11 Aug 2022 22:55) (96% - 99%)    Parameters below as of 11 Aug 2022 22:55  Patient On (Oxygen Delivery Method): room air      A/P  71 y/o female w/ PMH HTN and HLD c/o 3 day history of worsening fever and urinary frequency and 1 day history of chest pain and SOB that has resolved (no longer admitting to chest pain and SOB). Admitted for nausea, dysuria   Fever Possible Pneumonia, ON CEFIPIME    #CP  Symptoms consistent with PNA  Not hypoxic  12 LEAD ekg with SR. No acute ST/T changes  Stat trop ordered  Duoneb x1   C/W tele  Will continue to monitor      Fever   Likley r/t PNA  C/W Cefipime  F/U Bcx   Consider ID consult am     Will sign out to covering provider    Lexi Garcia FNP-BC,  AGACNP-BC  [id number].

## 2022-08-11 NOTE — H&P ADULT - NSHPLABSRESULTS_GEN_ALL_CORE
11.5   113.28 )-----------( 174      ( 11 Aug 2022 17:18 )             37.0       08-11    139  |  100  |  12  ----------------------------<  125<H>  3.6   |  25  |  0.65    Ca    9.9      11 Aug 2022 17:18    TPro  6.8  /  Alb  4.0  /  TBili  0.6  /  DBili  x   /  AST  19  /  ALT  15  /  AlkPhos  84  08-11                  PT/INR - ( 11 Aug 2022 17:18 )   PT: 12.2 sec;   INR: 1.05 ratio         PTT - ( 11 Aug 2022 17:18 )  PTT:29.8 sec    < from: Xray Chest 1 View- PORTABLE-Urgent (08.11.22 @ 17:44) >      INTERPRETATION:  Hazy right upper lobe opacity.      EKG SR NSST/T

## 2022-08-11 NOTE — ED PROVIDER NOTE - CLINICAL SUMMARY MEDICAL DECISION MAKING FREE TEXT BOX
71 y/o female w/ PMH HTN and HLD c/o 3 day history of worsening fever and urinary frequency and 1 day history of chest pain and SOB that has resolved (no longer admitting to chest pain and SOB). Admits to nausea, dysuria. Unremarkable physical examination. Will do sepsis workup and likely admit if RVP is negative for bacterial sepsis. 73 y/o female w/ PMH HTN and HLD  vhx of CLL diagnosed 8 y ago ,not on any treatment c/o 3 day history of worsening fever and urinary frequency and 1 day history of chest pain and SOB that has resolved (no longer admitting to chest pain and SOB). Admits to nausea, dysuria. seen today at [facility] Covid is negative as well as ua ,send to ER for work up of the fever  Unremarkable physical examination. Will do sepsis workup and likely admit if RVP is negative for bacterial sepsis. iv antibiotics ZR

## 2022-08-11 NOTE — ED ADULT NURSE REASSESSMENT NOTE - NS ED NURSE REASSESS COMMENT FT1
report received from Mitchell RN, pt A&Ox4, pt lying comfortably at this time, pt endorses feeling better, pt skin clean and intact, pt safety and comfort provided.

## 2022-08-11 NOTE — H&P ADULT - HISTORY OF PRESENT ILLNESS
73 y/o female w/ PMH HTN and HLD c/o 3 day history of worsening fever and urinary frequency and 1 day history of chest pain and SOB that has resolved (no longer admitting to chest pain and SOB). Admits to nausea, dysuria. Denies vomiting, dizziness, abdominal pain, hematuria.

## 2022-08-11 NOTE — ED PROVIDER NOTE - OBJECTIVE STATEMENT
73 y/o female w/ PMH HTN and HLD c/o 3 day history of worsening fever and urinary frequency and 1 day history of chest pain and SOB that has resolved (no longer admitting to chest pain and SOB). Admits to nausea, dysuria. Denies vomiting, dizziness, abdominal pain, hematuria.    PCP: Guy Alvarado MD

## 2022-08-11 NOTE — ED PROVIDER NOTE - NS ED ROS FT
CONSTITUTIONAL: + fever, no weight loss, or fatigue  EYES: No eye pain, visual disturbances, or discharge  ENMT:  No difficulty hearing, tinnitus, vertigo; No sinus or throat pain  NECK: No pain or stiffness  RESPIRATORY: No cough, wheezing, chills or hemoptysis; No shortness of breath  CARDIOVASCULAR: No chest pain, palpitations, dizziness, or leg swelling  GASTROINTESTINAL: No abdominal or epigastric pain. + nausea, no vomiting, or hematemesis; No diarrhea or constipation. No melena or hematochezia.  GENITOURINARY: + dysuria, + frequency, no hematuria, or incontinence  NEUROLOGICAL: No headaches, memory loss, loss of strength, numbness, or tremors  SKIN: No itching, burning, rashes, or lesions

## 2022-08-11 NOTE — ED ADULT NURSE REASSESSMENT NOTE - NS ED NURSE REASSESS COMMENT FT1
ACP Lexi contacted regarding pt complaining of chest pain, states she will come down and see pt soon

## 2022-08-11 NOTE — ED ADULT NURSE REASSESSMENT NOTE - NS ED NURSE REASSESS COMMENT FT1
pt endorses having chest pain described as pressure, EKG being obtained at this time, ACP Lexi contacted states call her back in 5 min

## 2022-08-11 NOTE — H&P ADULT - NSICDXPASTMEDICALHX_GEN_ALL_CORE_FT
PAST MEDICAL HISTORY:  CLL (chronic lymphocytic leukemia)     Hypercholesterolemia     Hypertension

## 2022-08-11 NOTE — ED ADULT NURSE NOTE - OBJECTIVE STATEMENT
assumed care of pt at 16:50. pt sent in from urgent care for high fever. pt reports fevers for last 4 days tmax 104. denies chest pain or sob. body aches and chills. present. pt also reports increased urination over last week. pmh of leukemia, unknown last treatment. poor historian. rr even and unlabored. placed on cardiac monitor and . sepsis protocol intiated. denies dizziness or weakness. pt educated on plan of care, pt able to successfully teach back plan of care to RN, RN will continue to reeducate pt during hospital stay.

## 2022-08-12 LAB
ANION GAP SERPL CALC-SCNC: 12 MMOL/L — SIGNIFICANT CHANGE UP (ref 5–17)
BUN SERPL-MCNC: 15 MG/DL — SIGNIFICANT CHANGE UP (ref 7–23)
CALCIUM SERPL-MCNC: 8.9 MG/DL — SIGNIFICANT CHANGE UP (ref 8.4–10.5)
CHLORIDE SERPL-SCNC: 102 MMOL/L — SIGNIFICANT CHANGE UP (ref 96–108)
CO2 SERPL-SCNC: 23 MMOL/L — SIGNIFICANT CHANGE UP (ref 22–31)
CREAT SERPL-MCNC: 0.6 MG/DL — SIGNIFICANT CHANGE UP (ref 0.5–1.3)
EGFR: 96 ML/MIN/1.73M2 — SIGNIFICANT CHANGE UP
GLUCOSE SERPL-MCNC: 128 MG/DL — HIGH (ref 70–99)
HCT VFR BLD CALC: 33.1 % — LOW (ref 34.5–45)
HGB BLD-MCNC: 10.4 G/DL — LOW (ref 11.5–15.5)
IGA FLD-MCNC: 36 MG/DL — LOW (ref 84–499)
IGG FLD-MCNC: 437 MG/DL — LOW (ref 610–1660)
IGM SERPL-MCNC: 12 MG/DL — LOW (ref 35–242)
KAPPA LC SER QL IFE: 0.71 MG/DL — SIGNIFICANT CHANGE UP (ref 0.33–1.94)
KAPPA/LAMBDA FREE LIGHT CHAIN RATIO, SERUM: 0.91 RATIO — SIGNIFICANT CHANGE UP (ref 0.26–1.65)
LAMBDA LC SER QL IFE: 0.78 MG/DL — SIGNIFICANT CHANGE UP (ref 0.57–2.63)
MANUAL DIF COMMENT BLD-IMP: SIGNIFICANT CHANGE UP
MCHC RBC-ENTMCNC: 28.8 PG — SIGNIFICANT CHANGE UP (ref 27–34)
MCHC RBC-ENTMCNC: 31.4 GM/DL — LOW (ref 32–36)
MCV RBC AUTO: 91.7 FL — SIGNIFICANT CHANGE UP (ref 80–100)
NRBC # BLD: 0 /100 WBCS — SIGNIFICANT CHANGE UP (ref 0–0)
PLATELET # BLD AUTO: 156 K/UL — SIGNIFICANT CHANGE UP (ref 150–400)
POTASSIUM SERPL-MCNC: 3 MMOL/L — LOW (ref 3.5–5.3)
POTASSIUM SERPL-SCNC: 3 MMOL/L — LOW (ref 3.5–5.3)
RBC # BLD: 3.61 M/UL — LOW (ref 3.8–5.2)
RBC # FLD: 14.7 % — HIGH (ref 10.3–14.5)
SODIUM SERPL-SCNC: 137 MMOL/L — SIGNIFICANT CHANGE UP (ref 135–145)
TROPONIN T, HIGH SENSITIVITY RESULT: 7 NG/L — SIGNIFICANT CHANGE UP (ref 0–51)
WBC # BLD: 90.51 K/UL — CRITICAL HIGH (ref 3.8–10.5)
WBC # FLD AUTO: 90.51 K/UL — CRITICAL HIGH (ref 3.8–10.5)

## 2022-08-12 PROCEDURE — 99223 1ST HOSP IP/OBS HIGH 75: CPT

## 2022-08-12 RX ORDER — CEFTRIAXONE 500 MG/1
2000 INJECTION, POWDER, FOR SOLUTION INTRAMUSCULAR; INTRAVENOUS EVERY 24 HOURS
Refills: 0 | Status: DISCONTINUED | OUTPATIENT
Start: 2022-08-12 | End: 2022-08-15

## 2022-08-12 RX ORDER — SODIUM CHLORIDE 9 MG/ML
1000 INJECTION, SOLUTION INTRAVENOUS
Refills: 0 | Status: DISCONTINUED | OUTPATIENT
Start: 2022-08-12 | End: 2022-08-15

## 2022-08-12 RX ORDER — ONDANSETRON 8 MG/1
4 TABLET, FILM COATED ORAL ONCE
Refills: 0 | Status: COMPLETED | OUTPATIENT
Start: 2022-08-12 | End: 2022-08-12

## 2022-08-12 RX ORDER — DIPHENHYDRAMINE HCL 50 MG
25 CAPSULE ORAL ONCE
Refills: 0 | Status: COMPLETED | OUTPATIENT
Start: 2022-08-12 | End: 2022-08-12

## 2022-08-12 RX ORDER — ACETAMINOPHEN 500 MG
1000 TABLET ORAL ONCE
Refills: 0 | Status: COMPLETED | OUTPATIENT
Start: 2022-08-12 | End: 2022-08-12

## 2022-08-12 RX ORDER — POTASSIUM CHLORIDE 20 MEQ
10 PACKET (EA) ORAL
Refills: 0 | Status: COMPLETED | OUTPATIENT
Start: 2022-08-12 | End: 2022-08-13

## 2022-08-12 RX ORDER — ACETAMINOPHEN 500 MG
650 TABLET ORAL ONCE
Refills: 0 | Status: COMPLETED | OUTPATIENT
Start: 2022-08-12 | End: 2022-08-12

## 2022-08-12 RX ORDER — SODIUM CHLORIDE 9 MG/ML
1000 INJECTION INTRAMUSCULAR; INTRAVENOUS; SUBCUTANEOUS
Refills: 0 | Status: DISCONTINUED | OUTPATIENT
Start: 2022-08-12 | End: 2022-08-15

## 2022-08-12 RX ORDER — POTASSIUM CHLORIDE 20 MEQ
40 PACKET (EA) ORAL EVERY 4 HOURS
Refills: 0 | Status: DISCONTINUED | OUTPATIENT
Start: 2022-08-12 | End: 2022-08-12

## 2022-08-12 RX ORDER — IMMUNE GLOBULIN (HUMAN) 10 G/100ML
25 INJECTION INTRAVENOUS; SUBCUTANEOUS ONCE
Refills: 0 | Status: COMPLETED | OUTPATIENT
Start: 2022-08-12 | End: 2022-08-12

## 2022-08-12 RX ADMIN — CEFTRIAXONE 100 MILLIGRAM(S): 500 INJECTION, POWDER, FOR SOLUTION INTRAMUSCULAR; INTRAVENOUS at 18:01

## 2022-08-12 RX ADMIN — ENOXAPARIN SODIUM 40 MILLIGRAM(S): 100 INJECTION SUBCUTANEOUS at 09:53

## 2022-08-12 RX ADMIN — ONDANSETRON 4 MILLIGRAM(S): 8 TABLET, FILM COATED ORAL at 22:27

## 2022-08-12 RX ADMIN — CARVEDILOL PHOSPHATE 3.12 MILLIGRAM(S): 80 CAPSULE, EXTENDED RELEASE ORAL at 17:52

## 2022-08-12 RX ADMIN — Medication 3 MILLILITER(S): at 00:19

## 2022-08-12 RX ADMIN — Medication 400 MILLIGRAM(S): at 04:30

## 2022-08-12 RX ADMIN — Medication 650 MILLIGRAM(S): at 17:52

## 2022-08-12 RX ADMIN — Medication 40 MILLIEQUIVALENT(S): at 17:51

## 2022-08-12 RX ADMIN — Medication 25 MILLIGRAM(S): at 18:01

## 2022-08-12 RX ADMIN — CEFEPIME 100 MILLIGRAM(S): 1 INJECTION, POWDER, FOR SOLUTION INTRAMUSCULAR; INTRAVENOUS at 05:08

## 2022-08-12 RX ADMIN — Medication 1000 MILLIGRAM(S): at 22:38

## 2022-08-12 RX ADMIN — Medication 650 MILLIGRAM(S): at 19:10

## 2022-08-12 RX ADMIN — Medication 1000 MILLIGRAM(S): at 05:00

## 2022-08-12 RX ADMIN — Medication 400 MILLIGRAM(S): at 13:12

## 2022-08-12 RX ADMIN — LISINOPRIL 20 MILLIGRAM(S): 2.5 TABLET ORAL at 05:08

## 2022-08-12 RX ADMIN — IMMUNE GLOBULIN (HUMAN) 41.67 GRAM(S): 10 INJECTION INTRAVENOUS; SUBCUTANEOUS at 20:07

## 2022-08-12 RX ADMIN — Medication 100 MILLIGRAM(S): at 18:00

## 2022-08-12 RX ADMIN — SODIUM CHLORIDE 75 MILLILITER(S): 9 INJECTION INTRAMUSCULAR; INTRAVENOUS; SUBCUTANEOUS at 02:25

## 2022-08-12 RX ADMIN — SODIUM CHLORIDE 75 MILLILITER(S): 9 INJECTION INTRAMUSCULAR; INTRAVENOUS; SUBCUTANEOUS at 07:00

## 2022-08-12 RX ADMIN — Medication 100 MILLIEQUIVALENT(S): at 23:12

## 2022-08-12 RX ADMIN — Medication 400 MILLIGRAM(S): at 22:37

## 2022-08-12 NOTE — CHART NOTE - NSCHARTNOTEFT_GEN_A_CORE
Medicine NP note    CC :Intermittent fever  Notified by RN that patient febrile with temp of 101.9F  IVIG in progress  Patient with intermittent fever since admission  Evaluated the patient at bedside.  Patient lying in bed, A&ox4, in no distress  Denies HA, dizziness, SOB, CP, throat pain, Abdominal pain, back pain, rash,     Vital Signs Last 24 Hrs  T(C): 39.6 (12 Aug 2022 22:30), Max: 39.6 (12 Aug 2022 22:30)  T(F): 103.2 (12 Aug 2022 22:30), Max: 103.2 (12 Aug 2022 22:30)  HR: 70 (12 Aug 2022 22:30) (66 - 89)  BP: 139/75 (12 Aug 2022 22:30) (100/51 - 139/75)  BP(mean): 67 (12 Aug 2022 05:49) (64 - 78)  RR: 20 (12 Aug 2022 22:30) (17 - 20)  SpO2: 96% (12 Aug 2022 22:30) (90% - 97%)    Physical exam:  Neuro: A&ox4,  ENT: Airway patent, no pharyngeal edema, uvula midline  CVS: S1S2 RRR  Pulm: CTA B/L  GI: Soft, Abdomen benign, BS+ in all 4 quadrants  Vascular: NO edema, peripheral cyanosis  Neuro:NO focal deficits  Skin: No rashes    A/P  73 y/o female w/ PMH HTN and HLD c/o 3 day history of worsening fever and urinary frequency and 1 day history of chest pain and SOB that has resolved (no longer admitting to chest pain and SOB). Admitted for nausea, dysuria   Fever Possible Pneumonia, ON CEFIPIME  Patient was started on IVIg today  Now ith temp of 101.9 , repeat temp after 30 minutes: 99.9 F  Pt had one episode of vomiting, noted food particles in vomitus, nauseas from admission, patient states that she vomited today prior to IVIG administration       #Fever /PNA IVIG in progress  No suspicion for IVIG reaction as patient was having intermittent fever since admission, vomiting x1   Fever likely r/t PNA  Not hypoxic  Instructed RN to lower the IVIG rate to half ( 100ml/hr, 1 hour left to complete IVIg )  Discussed with Hem onc Fellow  Recommended to continue IVIG  Zofran 4mg ivp x1 PRN dose now  Vitals q4 hourly  Closely monitor the pt POST IVIG  C/W [id number] @ 75ml/hr for hydration  Trend temperature  F/U Bcx  Will continue to monitor  Will sign out to covering provider    Lexi Garcia [facility]  [id number]

## 2022-08-12 NOTE — CONSULT NOTE ADULT - SUBJECTIVE AND OBJECTIVE BOX
HPI:   Patient is a 72y female with CLL on no tx, htn who developed fever, chills, vomiting , some cough starting 4 d ago. She may have taken an antibiotic prescribed by her pcp but is not sure, thinks maybe cipro but still with fever and feels poorly. No ha, sob, diarrhea, abdo pain, hematuria, flank pain , limb pain, dizziness. She has no recent travel or animal exposure. no woods.     REVIEW OF SYSTEMS:  All other review of systems negative (Comprehensive ROS)    PAST MEDICAL & SURGICAL HISTORY:  Hypertension      Hypercholesterolemia      CLL (chronic lymphocytic leukemia)      No significant past surgical history          Allergies    No Known Allergies    Intolerances        Antimicrobials Day #  :  cefepime   IVPB 1000 milliGRAM(s) IV Intermittent every 12 hours    Other Medications:  acetaminophen     Tablet .. 650 milliGRAM(s) Oral every 6 hours PRN  acetaminophen     Tablet .. 650 milliGRAM(s) Oral once  artificial  tears Solution 1 Drop(s) Both EYES four times a day PRN  carvedilol 3.125 milliGRAM(s) Oral every 12 hours  diphenhydrAMINE 25 milliGRAM(s) Oral once PRN  enoxaparin Injectable 40 milliGRAM(s) SubCutaneous every 24 hours  immune   globulin 10% (GAMMAGARD) IVPB 25 Gram(s) IV Intermittent once  lisinopril 20 milliGRAM(s) Oral daily  potassium chloride    Tablet ER 40 milliEquivalent(s) Oral every 4 hours  simvastatin 40 milliGRAM(s) Oral at bedtime  sodium chloride 0.9%. 1000 milliLiter(s) IV Continuous <Continuous>      FAMILY HISTORY:  No pertinent family history in first degree relatives        SOCIAL HISTORY:  Smoking: [ ]Yes x[ ]No  ETOH: [ ]Yes [x ]No  Drug Use: [ ]Yes [ ]xNo   [ x] Single[ ]    T(F): 99.7 (22 @ 13:15), Max: 102.9 (22 @ 22:55)  HR: 80 (22 @ 12:43)  BP: 137/69 (22 @ 12:43)  RR: 18 (22 @ 12:43)  SpO2: 94% (22 @ 12:43)  Wt(kg): --    PHYSICAL EXAM:  General: alert, no acute distress  Eyes:  anicteric, no conjunctival injection, no discharge  Oropharynx: no lesions or injection 	  Neck: supple, without adenopathy  Lungs: clear to auscultation  Heart: regular rate and rhythm; no murmur, rubs or gallops  Abdomen: soft, nondistended, nontender, without mass or organomegaly  Skin: no lesions  Extremities: no clubbing, cyanosis, or edema  Neurologic: alert, oriented, moves all extremities    LAB RESULTS:                        10.4   90.51 )-----------( 156      ( 12 Aug 2022 05:45 )             33.1     08-    137  |  102  |  15  ----------------------------<  128<H>  3.0<L>   |  23  |  0.60    Ca    8.9      12 Aug 2022 05:45    TPro  6.8  /  Alb  4.0  /  TBili  0.6  /  DBili  x   /  AST  19  /  ALT  15  /  AlkPhos  84  08-11    LIVER FUNCTIONS - ( 11 Aug 2022 17:18 )  Alb: 4.0 g/dL / Pro: 6.8 g/dL / ALK PHOS: 84 U/L / ALT: 15 U/L / AST: 19 U/L / GGT: x           Urinalysis Basic - ( 11 Aug 2022 19:12 )    Color: Yellow / Appearance: Clear / S.037 / pH: x  Gluc: x / Ketone: Small  / Bili: Negative / Urobili: Negative   Blood: x / Protein: 30 mg/dL / Nitrite: Negative   Leuk Esterase: Negative / RBC: 6 /hpf / WBC 2 /HPF   Sq Epi: x / Non Sq Epi: 3 /hpf / Bacteria: 0.0        MICROBIOLOGY:  RECENT CULTURES:        RADIOLOGY REVIEWED:    < from: CT Chest No Cont (22 @ 19:46) >    ACC: [id number] EXAM:  CT CHEST                          PROCEDURE DATE:  2022          INTERPRETATION:  CLINICAL INFORMATION: Fever    COMPARISON: CT chest abdomen pelvis 10/27/2014    CONTRAST/COMPLICATIONS:  IV Contrast: NONE  Oral Contrast:NONE  Complications: None reported at time of study completion    PROCEDURE:  CT scan of the chest was obtained without intravenous contrast.    FINDINGS:    LYMPH NODES: A few enlarged mediastinal lymph nodes with the largest   being a lower paratracheal node measuring up to 1.9 cm (2, 44).    HEART/VASCULATURE: Heart size normal. No pericardial effusion. Scattered   coronary artery calcifications. Aortic calcifications.    AIRWAYS/LUNGS/PLEURA: The central airways are patent. There is a mild   consolidative and groundglass opacity in the right upper lobe. No pleural   effusions.    UPPER ABDOMEN: There is diffuse hypoattenuation of the hepatic   parenchyma, consistent with hepatic steatosis. Small hiatal hernia.   Cholelithiasis. There are two peripherally calcified probable splenic   artery aneurysms, with the largest measuring 7 mm.    BONES/SOFT TISSUES: Degenerative changes.    IMPRESSION:    Mild consolidative and groundglass opacity in the right upper lobe,   likely representing pneumonia. Follow-up noncontrast chest CT can be   obtained in 4-6 weeks to assess for resolution if clinically indicated.    A few enlarged mediastinal lymph nodes measuring up to 1.9 cm, likely   reactive.    --- End of Report ---          < end of copied text >        Impression:  Patient with CLL, no tx, not neutropenic admitted with fever, some cough, some vomiting, found to have right sided pneumonia on ct chest. No recent travel, no hospital stay. She is at most risk for encapsulated organisms like pneumonococcus and klebsiella.     Recommendations:  cover with ceftriaxone and doxycycline  f/u blood cx  check immunoglobulin level, maybe needs iv ig as mentioned by heme onc  sputum cx if makes any  legionella ag, full rvp

## 2022-08-12 NOTE — PROVIDER CONTACT NOTE (OTHER) - ACTION/TREATMENT ORDERED:
Provider aware, fever not likely to be from IVIG infusion. Continue infusion, try to cool off patient with cool packs.

## 2022-08-12 NOTE — CONSULT NOTE ADULT - SUBJECTIVE AND OBJECTIVE BOX
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 Admits to nausea, dysuria. Denies vomiting, dizziness, abdominal pain, hematuria.       PAST MEDICAL & SURGICAL HISTORY:  Hypertension      Hypercholesterolemia      CLL (chronic lymphocytic leukemia)      No significant past surgical history          Allergies    No Known Allergies    Intolerances        MEDICATIONS  (STANDING):  carvedilol 3.125 milliGRAM(s) Oral every 12 hours  cefepime   IVPB 1000 milliGRAM(s) IV Intermittent every 12 hours  enoxaparin Injectable 40 milliGRAM(s) SubCutaneous every 24 hours  lisinopril 20 milliGRAM(s) Oral daily  simvastatin 40 milliGRAM(s) Oral at bedtime  sodium chloride 0.9%. 1000 milliLiter(s) (75 mL/Hr) IV Continuous <Continuous>    MEDICATIONS  (PRN):  acetaminophen     Tablet .. 650 milliGRAM(s) Oral every 6 hours PRN Temp greater or equal to 38.5C (101.3F), Mild Pain (1 - 3)  artificial  tears Solution 1 Drop(s) Both EYES four times a day PRN Dry Eyes      FAMILY HISTORY:  No pertinent family history in first degree relatives        SOCIAL HISTORY: No EtOH, no tobacco    REVIEW OF SYSTEMS:    CONSTITUTIONAL: No weakness, fevers or chills  EYES/ENT: No visual changes;  No vertigo or throat pain   NECK: No pain or stiffness  RESPIRATORY: No cough, wheezing, hemoptysis; No shortness of breath  CARDIOVASCULAR: No chest pain or palpitations  GASTROINTESTINAL: No abdominal or epigastric pain. No nausea, vomiting, or hematemesis; No diarrhea or constipation. No melena or hematochezia.  GENITOURINARY: No dysuria, frequency or hematuria  NEUROLOGICAL: No numbness or weakness  SKIN: No itching, burning, rashes, or lesions   All other review of systems is negative unless indicated above.    Height (cm): 165.1 (08-11 @ 16:31)  Weight (kg): 59 (08-11 @ 16:31)  BMI (kg/m2): 21.6 (08-11 @ 16:31)  BSA (m2): 1.65 (08-11 @ 16:31)    T(F): 99.1 (08-12-22 @ 07:46), Max: 102.9 (08-11-22 @ 22:55)  HR: 89 (08-12-22 @ 07:46)  BP: 116/54 (08-12-22 @ 07:46)  RR: 20 (08-12-22 @ 07:46)  SpO2: 97% (08-12-22 @ 07:46)  Wt(kg): --    GENERAL: NAD, well-developed  HEAD:  Atraumatic, Normocephalic  EYES: EOMI, PERRLA, conjunctiva and sclera clear  NECK: Supple, No JVD  CHEST/LUNG: Clear to auscultation bilaterally; No wheeze  HEART: Regular rate and rhythm; No murmurs, rubs, or gallops  ABDOMEN: Soft, Nontender, Nondistended; Bowel sounds present  EXTREMITIES:  2+ Peripheral Pulses, No clubbing, cyanosis, or edema  NEUROLOGY: non-focal  SKIN: No rashes or lesions                          10.4   90.51 )-----------( 156      ( 12 Aug 2022 05:45 )             33.1       08-12    137  |  102  |  15  ----------------------------<  128<H>  3.0<L>   |  23  |  0.60    Ca    8.9      12 Aug 2022 05:45    TPro  6.8  /  Alb  4.0  /  TBili  0.6  /  DBili  x   /  AST  19  /  ALT  15  /  AlkPhos  84  08-11          PT/INR - ( 11 Aug 2022 17:18 )   PT: 12.2 sec;   INR: 1.05 ratio         PTT - ( 11 Aug 2022 17:18 )  PTT:29.8 sec

## 2022-08-12 NOTE — PROGRESS NOTE ADULT - SUBJECTIVE AND OBJECTIVE BOX
Patient is a 72y old  Female who presents with a chief complaint of fever (12 Aug 2022 17:38)      SUBJECTIVE / OVERNIGHT EVENTS: Comfortable without new complaints.   Review of Systems  chest pain no  palpitations no  sob no  nausea no  headache no    MEDICATIONS  (STANDING):  carvedilol 3.125 milliGRAM(s) Oral every 12 hours  cefTRIAXone   IVPB 2000 milliGRAM(s) IV Intermittent every 24 hours  doxycycline monohydrate Capsule 100 milliGRAM(s) Oral every 12 hours  enoxaparin Injectable 40 milliGRAM(s) SubCutaneous every 24 hours  lisinopril 20 milliGRAM(s) Oral daily  potassium chloride    Tablet ER 40 milliEquivalent(s) Oral every 4 hours  simvastatin 40 milliGRAM(s) Oral at bedtime  sodium chloride 0.9%. 1000 milliLiter(s) (75 mL/Hr) IV Continuous <Continuous>    MEDICATIONS  (PRN):  acetaminophen     Tablet .. 650 milliGRAM(s) Oral every 6 hours PRN Temp greater or equal to 38.5C (101.3F), Mild Pain (1 - 3)  artificial  tears Solution 1 Drop(s) Both EYES four times a day PRN Dry Eyes      Vital Signs Last 24 Hrs  T(C): 37.7 (12 Aug 2022 19:48), Max: 39.4 (11 Aug 2022 22:55)  T(F): 99.9 (12 Aug 2022 19:48), Max: 102.9 (11 Aug 2022 22:55)  HR: 67 (12 Aug 2022 19:48) (67 - 89)  BP: 110/63 (12 Aug 2022 19:48) (100/51 - 137/69)  BP(mean): 67 (12 Aug 2022 05:49) (64 - 78)  RR: 17 (12 Aug 2022 19:48) (17 - 20)  SpO2: 95% (12 Aug 2022 19:48) (90% - 97%)    Parameters below as of 12 Aug 2022 19:48  Patient On (Oxygen Delivery Method): room air        PHYSICAL EXAM:  GENERAL: NAD, well-developed  HEAD:  Atraumatic, Normocephalic  EYES: EOMI, PERRLA, conjunctiva and sclera clear  NECK: Supple, No JVD  CHEST/LUNG: Clear to auscultation bilaterally; No wheeze  HEART: Regular rate and rhythm; No murmurs, rubs, or gallops  ABDOMEN: Soft, Nontender, Nondistended; Bowel sounds present  EXTREMITIES:  2+ Peripheral Pulses, No clubbing, cyanosis, or edema  PSYCH: AAOx3  NEUROLOGY: non-focal  SKIN: No rashes or lesions    LABS:                        10.4   90.51 )-----------( 156      ( 12 Aug 2022 05:45 )             33.1     08-12    137  |  102  |  15  ----------------------------<  128<H>  3.0<L>   |  23  |  0.60    Ca    8.9      12 Aug 2022 05:45    TPro  6.8  /  Alb  4.0  /  TBili  0.6  /  DBili  x   /  AST  19  /  ALT  15  /  AlkPhos  84  08-11    PT/INR - ( 11 Aug 2022 17:18 )   PT: 12.2 sec;   INR: 1.05 ratio         PTT - ( 11 Aug 2022 17:18 )  PTT:29.8 sec      Urinalysis Basic - ( 11 Aug 2022 19:12 )    Color: Yellow / Appearance: Clear / S.037 / pH: x  Gluc: x / Ketone: Small  / Bili: Negative / Urobili: Negative   Blood: x / Protein: 30 mg/dL / Nitrite: Negative   Leuk Esterase: Negative / RBC: 6 /hpf / WBC 2 /HPF   Sq Epi: x / Non Sq Epi: 3 /hpf / Bacteria: 0.0          RADIOLOGY & ADDITIONAL TESTS:    Imaging Personally Reviewed:  < from: CT Chest No Cont (22 @ 19:46) >  IMPRESSION:    Mild consolidative and groundglass opacity in the right upper lobe,   likely representing pneumonia. Follow-up noncontrast chest CT can be   obtained in 4-6 weeks to assess for resolution if clinically indicated.    A few enlarged mediastinal lymph nodes measuring up to 1.9 cm, likely   reactive.    < end of copied text >    Consultant(s) Notes Reviewed:      Care Discussed with Consultants/Other Providers:

## 2022-08-13 LAB
ANION GAP SERPL CALC-SCNC: 10 MMOL/L — SIGNIFICANT CHANGE UP (ref 5–17)
BUN SERPL-MCNC: 18 MG/DL — SIGNIFICANT CHANGE UP (ref 7–23)
CALCIUM SERPL-MCNC: 9 MG/DL — SIGNIFICANT CHANGE UP (ref 8.4–10.5)
CHLORIDE SERPL-SCNC: 104 MMOL/L — SIGNIFICANT CHANGE UP (ref 96–108)
CO2 SERPL-SCNC: 24 MMOL/L — SIGNIFICANT CHANGE UP (ref 22–31)
CREAT SERPL-MCNC: 0.58 MG/DL — SIGNIFICANT CHANGE UP (ref 0.5–1.3)
CULTURE RESULTS: NO GROWTH — SIGNIFICANT CHANGE UP
EGFR: 97 ML/MIN/1.73M2 — SIGNIFICANT CHANGE UP
GLUCOSE BLDC GLUCOMTR-MCNC: 107 MG/DL — HIGH (ref 70–99)
GLUCOSE SERPL-MCNC: 138 MG/DL — HIGH (ref 70–99)
GRAM STN FLD: SIGNIFICANT CHANGE UP
HCT VFR BLD CALC: 33.7 % — LOW (ref 34.5–45)
HGB BLD-MCNC: 10.4 G/DL — LOW (ref 11.5–15.5)
LEGIONELLA AG UR QL: NEGATIVE — SIGNIFICANT CHANGE UP
MCHC RBC-ENTMCNC: 28.8 PG — SIGNIFICANT CHANGE UP (ref 27–34)
MCHC RBC-ENTMCNC: 30.9 GM/DL — LOW (ref 32–36)
MCV RBC AUTO: 93.4 FL — SIGNIFICANT CHANGE UP (ref 80–100)
NRBC # BLD: 0 /100 WBCS — SIGNIFICANT CHANGE UP (ref 0–0)
PLATELET # BLD AUTO: 154 K/UL — SIGNIFICANT CHANGE UP (ref 150–400)
POTASSIUM SERPL-MCNC: 4.3 MMOL/L — SIGNIFICANT CHANGE UP (ref 3.5–5.3)
POTASSIUM SERPL-SCNC: 4.3 MMOL/L — SIGNIFICANT CHANGE UP (ref 3.5–5.3)
RBC # BLD: 3.61 M/UL — LOW (ref 3.8–5.2)
RBC # FLD: 15 % — HIGH (ref 10.3–14.5)
SODIUM SERPL-SCNC: 138 MMOL/L — SIGNIFICANT CHANGE UP (ref 135–145)
SPECIMEN SOURCE: SIGNIFICANT CHANGE UP
SPECIMEN SOURCE: SIGNIFICANT CHANGE UP
WBC # BLD: 74.59 K/UL — CRITICAL HIGH (ref 3.8–10.5)
WBC # FLD AUTO: 74.59 K/UL — CRITICAL HIGH (ref 3.8–10.5)

## 2022-08-13 RX ORDER — ALPRAZOLAM 0.25 MG
0.25 TABLET ORAL EVERY 8 HOURS
Refills: 0 | Status: DISCONTINUED | OUTPATIENT
Start: 2022-08-13 | End: 2022-08-15

## 2022-08-13 RX ADMIN — Medication 0.25 MILLIGRAM(S): at 15:00

## 2022-08-13 RX ADMIN — LISINOPRIL 20 MILLIGRAM(S): 2.5 TABLET ORAL at 05:40

## 2022-08-13 RX ADMIN — Medication 100 MILLIGRAM(S): at 05:40

## 2022-08-13 RX ADMIN — CEFTRIAXONE 100 MILLIGRAM(S): 500 INJECTION, POWDER, FOR SOLUTION INTRAMUSCULAR; INTRAVENOUS at 18:21

## 2022-08-13 RX ADMIN — Medication 100 MILLIEQUIVALENT(S): at 00:49

## 2022-08-13 RX ADMIN — ENOXAPARIN SODIUM 40 MILLIGRAM(S): 100 INJECTION SUBCUTANEOUS at 10:19

## 2022-08-13 RX ADMIN — Medication 100 MILLIEQUIVALENT(S): at 03:59

## 2022-08-13 RX ADMIN — Medication 1200 MILLIGRAM(S): at 18:21

## 2022-08-13 RX ADMIN — SIMVASTATIN 40 MILLIGRAM(S): 20 TABLET, FILM COATED ORAL at 21:11

## 2022-08-13 RX ADMIN — CARVEDILOL PHOSPHATE 3.12 MILLIGRAM(S): 80 CAPSULE, EXTENDED RELEASE ORAL at 05:40

## 2022-08-13 RX ADMIN — Medication 100 MILLIGRAM(S): at 18:20

## 2022-08-13 RX ADMIN — CARVEDILOL PHOSPHATE 3.12 MILLIGRAM(S): 80 CAPSULE, EXTENDED RELEASE ORAL at 18:20

## 2022-08-13 NOTE — PROGRESS NOTE ADULT - SUBJECTIVE AND OBJECTIVE BOX
Patient is a 72y old  Female who presents with a chief complaint of fever (12 Aug 2022 17:38)      SUBJECTIVE / OVERNIGHT EVENTS: feels better.  Family at bedside.   Review of Systems  chest pain no  palpitations no  sob improving   nausea no  headache no    MEDICATIONS  (STANDING):  carvedilol 3.125 milliGRAM(s) Oral every 12 hours  cefTRIAXone   IVPB 2000 milliGRAM(s) IV Intermittent every 24 hours  dextrose 5% + sodium chloride 0.9%. 1000 milliLiter(s) (75 mL/Hr) IV Continuous <Continuous>  doxycycline monohydrate Capsule 100 milliGRAM(s) Oral every 12 hours  enoxaparin Injectable 40 milliGRAM(s) SubCutaneous every 24 hours  lisinopril 20 milliGRAM(s) Oral daily  simvastatin 40 milliGRAM(s) Oral at bedtime  sodium chloride 0.9%. 1000 milliLiter(s) (75 mL/Hr) IV Continuous <Continuous>    MEDICATIONS  (PRN):  acetaminophen     Tablet .. 650 milliGRAM(s) Oral every 6 hours PRN Temp greater or equal to 38.5C (101.3F), Mild Pain (1 - 3)  artificial  tears Solution 1 Drop(s) Both EYES four times a day PRN Dry Eyes      Vital Signs Last 24 Hrs  T(C): 36.8 (13 Aug 2022 13:24), Max: 39.6 (12 Aug 2022 22:30)  T(F): 98.3 (13 Aug 2022 13:24), Max: 103.2 (12 Aug 2022 22:30)  HR: 75 (13 Aug 2022 13:24) (62 - 75)  BP: 119/57 (13 Aug 2022 13:24) (105/51 - 139/75)  BP(mean): --  RR: 18 (13 Aug 2022 13:24) (17 - 20)  SpO2: 97% (13 Aug 2022 13:24) (92% - 97%)    Parameters below as of 13 Aug 2022 13:24  Patient On (Oxygen Delivery Method): room air        PHYSICAL EXAM:  GENERAL: NAD, well-developed  HEAD:  Atraumatic, Normocephalic  EYES: EOMI, PERRLA, conjunctiva and sclera clear  NECK: Supple, No JVD  CHEST/LUNG: Clear to auscultation bilaterally; No wheeze  HEART: Regular rate and rhythm; No murmurs, rubs, or gallops  ABDOMEN: Soft, Nontender, Nondistended; Bowel sounds present  EXTREMITIES:  2+ Peripheral Pulses, No clubbing, cyanosis, or edema  PSYCH: AAOx3, anxious   NEUROLOGY: non-focal  SKIN: No rashes or lesions    LABS:                        10.4   74.59 )-----------( 154      ( 13 Aug 2022 05:59 )             33.7     08-13    138  |  104  |  18  ----------------------------<  138<H>  4.3   |  24  |  0.58    Ca    9.0      13 Aug 2022 05:58    TPro  6.8  /  Alb  4.0  /  TBili  0.6  /  DBili  x   /  AST  19  /  ALT  15  /  AlkPhos  84  08-11    PT/INR - ( 11 Aug 2022 17:18 )   PT: 12.2 sec;   INR: 1.05 ratio         PTT - ( 11 Aug 2022 17:18 )  PTT:29.8 sec      Urinalysis Basic - ( 11 Aug 2022 19:12 )    Color: Yellow / Appearance: Clear / S.037 / pH: x  Gluc: x / Ketone: Small  / Bili: Negative / Urobili: Negative   Blood: x / Protein: 30 mg/dL / Nitrite: Negative   Leuk Esterase: Negative / RBC: 6 /hpf / WBC 2 /HPF   Sq Epi: x / Non Sq Epi: 3 /hpf / Bacteria: 0.0        Culture - Urine (collected 11 Aug 2022 19:12)  Source: Clean Catch Clean Catch (Midstream)  Final Report (13 Aug 2022 07:18):    No growth    Culture - Blood (collected 11 Aug 2022 17:15)  Source: .Blood Blood-Peripheral  Preliminary Report (12 Aug 2022 23:02):    No growth to date.    Culture - Blood (collected 11 Aug 2022 17:00)  Source: .Blood Blood-Peripheral  Preliminary Report (12 Aug 2022 23:02):    No growth to date.        RADIOLOGY & ADDITIONAL TESTS:    Imaging Personally Reviewed:    Consultant(s) Notes Reviewed:      Care Discussed with Consultants/Other Providers:

## 2022-08-13 NOTE — PROGRESS NOTE ADULT - SUBJECTIVE AND OBJECTIVE BOX
CC: f/u for pneumonia    Patient reports  feels better  REVIEW OF SYSTEMS:  All other review of systems negative (Comprehensive ROS)    Antimicrobials Day #  :2  cefTRIAXone   IVPB 2000 milliGRAM(s) IV Intermittent every 24 hours  doxycycline monohydrate Capsule 100 milliGRAM(s) Oral every 12 hours    Other Medications Reviewed    T(F): 100 (08-13-22 @ 16:44), Max: 103.2 (08-12-22 @ 22:30)  HR: 75 (08-13-22 @ 16:44)  BP: 111/51 (08-13-22 @ 16:44)  RR: 18 (08-13-22 @ 16:44)  SpO2: 95% (08-13-22 @ 16:44)  Wt(kg): --    PHYSICAL EXAM:  General: alert, no acute distress  Eyes:  anicteric, no conjunctival injection, no discharge  Oropharynx: no lesions or injection 	  Neck: supple, without adenopathy  Lungs: egpphony right upper  to auscultation  Heart: regular rate and rhythm; no murmur, rubs or gallops  Abdomen: soft, nondistended, nontender, without mass or organomegaly  Skin: no lesions  Extremities: no clubbing, cyanosis, or edema  Neurologic: alert, oriented, moves all extremities    LAB RESULTS:                        10.4   74.59 )-----------( 154      ( 13 Aug 2022 05:59 )             33.7     08-13    138  |  104  |  18  ----------------------------<  138<H>  4.3   |  24  |  0.58    Ca    9.0      13 Aug 2022 05:58          MICROBIOLOGY:  RECENT CULTURES:  08-11 @ 19:12 Clean Catch Clean Catch (Midstream)     No growth      08-11 @ 17:15 .Blood Blood-Peripheral     No growth to date.      08-11 @ 17:00 .Blood Blood-Peripheral     No growth to date.          RADIOLOGY REVIEWED:    < from: CT Chest No Cont (08.11.22 @ 19:46) >    ACC: [id number] EXAM:  CT CHEST                          PROCEDURE DATE:  08/11/2022          INTERPRETATION:  CLINICAL INFORMATION: Fever    COMPARISON: CT chest abdomen pelvis 10/27/2014    CONTRAST/COMPLICATIONS:  IV Contrast: NONE  Oral Contrast:NONE  Complications: None reported at time of study completion    PROCEDURE:  CT scan of the chest was obtained without intravenous contrast.    FINDINGS:    LYMPH NODES: A few enlarged mediastinal lymph nodes with the largest   being a lower paratracheal node measuring up to 1.9 cm (2, 44).    HEART/VASCULATURE: Heart size normal. No pericardial effusion. Scattered   coronary artery calcifications. Aortic calcifications.    AIRWAYS/LUNGS/PLEURA: The central airways are patent. There is a mild   consolidative and groundglass opacity in the right upper lobe. No pleural   effusions.    UPPER ABDOMEN: There is diffuse hypoattenuation of the hepatic   parenchyma, consistent with hepatic steatosis. Small hiatal hernia.   Cholelithiasis. There are two peripherally calcified probable splenic   artery aneurysms, with the largest measuring 7 mm.    BONES/SOFT TISSUES: Degenerative changes.    IMPRESSION:    Mild consolidative and groundglass opacity in the right upper lobe,   likely representing pneumonia. Follow-up noncontrast chest CT can be   obtained in 4-6 weeks to assess for resolution if clinically indicated.    A few enlarged mediastinal lymph nodes measuring up to 1.9 cm, likely   reactive.    --- End of Report ---            < end of copied text >            Assessment:  patient with CLL not on tx admitted with fever for a few days, mil cough, found to have pneumonia, clinically improved on ctx and doxycycline. legionella neg cx neg , rvp neg  Plan:  continue ceftriaxone and doxy  f/u final cultures  once afebrile full 24 hour po antibiotic  f/u igg level

## 2022-08-13 NOTE — CONSULT NOTE ADULT - SUBJECTIVE AND OBJECTIVE BOX
[facility] - [facility]  CONSULT NOTE    REASON FOR CONSULT:    Asked to see this    HPI:    PMHX:  Hypertension    Hypercholesterolemia    CLL (chronic lymphocytic leukemia)        PSHX:  No significant past surgical history        FAMILY HISTORY:  No pertinent family history in first degree relatives        SOCIAL HISTORY:    Pulmonary Medications:   guaiFENesin ER 1200 milliGRAM(s) Oral every 12 hours      Antimicrobials:  cefTRIAXone   IVPB 2000 milliGRAM(s) IV Intermittent every 24 hours  doxycycline monohydrate Capsule 100 milliGRAM(s) Oral every 12 hours      Cardiology:  carvedilol 3.125 milliGRAM(s) Oral every 12 hours  lisinopril 20 milliGRAM(s) Oral daily      Other:  acetaminophen     Tablet .. 650 milliGRAM(s) Oral every 6 hours PRN  ALPRAZolam 0.25 milliGRAM(s) Oral every 8 hours PRN  artificial  tears Solution 1 Drop(s) Both EYES four times a day PRN  dextrose 5% + sodium chloride 0.9%. 1000 milliLiter(s) IV Continuous <Continuous>  enoxaparin Injectable 40 milliGRAM(s) SubCutaneous every 24 hours  simvastatin 40 milliGRAM(s) Oral at bedtime  sodium chloride 0.9%. 1000 milliLiter(s) IV Continuous <Continuous>      Allergies    No Known Allergies    Intolerances        HOME MEDICATIONS:    REVIEW OF SYSTEMS:      Constitutional: Within normal limits  HEENT: Within normal limits  Neck: Within normal limits  Resp: No dyspnea at rest, dyspnea on exertion, chest congestion/wheeze, cough. stridor, sputum production, chest pain with respiration, hemoptysis  CV: No chest pain, chest pressure, chest discomfort, palpitations, lightheadedness/dizziness, syncope, lower extremity edema, inability to lay flat to sleep, awakening from sleep unable to sleep, claudication.  Extremities: Within normal limits  GI: Within normal limits  : Within normal limits   Psych/Neurological: Within normal limits  Musculoskeletal: Within normal limits  Hematologic: Within normal limits  Endocrinologic: Within normal limits   Skin: Within normal limits                                                                                                                                                                                [ ]Unable to obtain    OBJECTIVE:      ICU Vital Signs Last 24 Hrs  T(C): 36.8 (13 Aug 2022 13:24), Max: 39.6 (12 Aug 2022 22:30)  T(F): 98.3 (13 Aug 2022 13:24), Max: 103.2 (12 Aug 2022 22:30)  HR: 75 (13 Aug 2022 13:24) (62 - 75)  BP: 119/57 (13 Aug 2022 13:24) (105/51 - 139/75)  BP(mean): --  ABP: --  ABP(mean): --  RR: 18 (13 Aug 2022 13:24) (17 - 20)  SpO2: 97% (13 Aug 2022 13:24) (92% - 97%)    O2 Parameters below as of 13 Aug 2022 13:24  Patient On (Oxygen Delivery Method): room air            I&O's Detail    12 Aug 2022 07:01  -  13 Aug 2022 07:00  --------------------------------------------------------  IN:    IV PiggyBack: 200 mL    sodium chloride 0.9%: 525 mL  Total IN: 725 mL    OUT:    Voided (mL): 200 mL  Total OUT: 200 mL    Total NET: 525 mL      13 Aug 2022 07:01  -  13 Aug 2022 15:19  --------------------------------------------------------  IN:    Oral Fluid: 640 mL  Total IN: 640 mL    OUT:    Voided (mL): 700 mL  Total OUT: 700 mL    Total NET: -60 mL        Daily     Daily   CAPILLARY BLOOD GLUCOSE      POCT Blood Glucose.: 107 mg/dL (13 Aug 2022 08:58)      PHYSICAL EXAM:  General: Awake, alert, cooperative, no distress, appears stated age   Head: Atraumatic, normocephalic  Eyes: Anicteric, conjunctiva/corneas clear, EOM's intact, PERRL, both eyes               Ears: External examination WNL, both ears                Nose: Nares normal, septum midline, mucosa normal, no drainage or sinus tenderness  Throat: Mallampati Grade:     No tonsillar or pharyngeal exudates. Lips, mucosa and tongue WNL; teeth and gums WNL  Neck: Supple, symmetric, trachea midline; thyroid without enlargement/tenderness/nodules; no carotid bruit; no JVD  Back: Symmetric, no curvature, range of motion normal, no CVA tenderness  Chest wall: No tenderness or deformity  Respiratory: Respirations unlabored; Clear to auscultation and percussion bilaterally  Cardiovascular: Regular rate and rhythm, S1 S2 normal. No murmurs, rubs or gallops.   Abdomen: Soft, non-tender, non-distended. No organomegaly. No masses. Normal bowel sounds  Extremities: Warm to touch. No clubbing or cyanosis. No pedal edema.  Pulses: 2+ peripheral pulses all extremities  Skin: Normal skin color, texture and turgor. No rashes or lesions  Lymph Nodes: Cervical, supraclavicular and axillary nodes normal  Neurological: Motor and sensory examination equal and normal. A and O x 3  Psychiatry: Appropriate mood and affect.    LABS:                            10.4   74.59 )-----------( 154      ( 13 Aug 2022 05:59 )             33.7         138  |  104  |  18  ----------------------------<  138<H>  4.3   |  24  |  0.58    Ca    9.0      13 Aug 2022 05:58    TPro  6.8  /  Alb  4.0  /  TBili  0.6  /  DBili  x   /  AST  19  /  ALT  15  /  AlkPhos  84  08-11    PT/INR - ( 11 Aug 2022 17:18 )   PT: 12.2 sec;   INR: 1.05 ratio         PTT - ( 11 Aug 2022 17:18 )  PTT:29.8 sec  Urinalysis Basic - ( 11 Aug 2022 19:12 )    Color: Yellow / Appearance: Clear / S.037 / pH: x  Gluc: x / Ketone: Small  / Bili: Negative / Urobili: Negative   Blood: x / Protein: 30 mg/dL / Nitrite: Negative   Leuk Esterase: Negative / RBC: 6 /hpf / WBC 2 /HPF   Sq Epi: x / Non Sq Epi: 3 /hpf / Bacteria: 0.0      Venous Blood Gas:   @ 17:15  7.40/44/26/27/39.3  VBG Lactate: 1.2        MICROBIOLOGY:     RADIOLOGY:  [ ] Reviewed and interpreted by me    CXR:      CT Scan:     [facility] - [facility]  CONSULT NOTE    REASON FOR CONSULT:  Dyspnea, Abnormal CT chest    HPI: Asked to see this 72 year old female with a history of CLL (not requiring treatment), admitted with fever, chest pain, dyspnea and urinary frequency, on .   Her chest pain and dyspnea resolved on admission but her chest xray and chest CT were consistent with pneumonia.   The patient reports mild dyspnea on exertion presently with an occasional, mainly dry, cough.  She also notes some nausea at times.       PMHX:  Hypertension    Hypercholesterolemia    CLL (chronic lymphocytic leukemia)        PSHX:  No significant past surgical history        FAMILY HISTORY:  No pertinent family history in first degree relatives        SOCIAL HISTORY:  Denied cigarette smoking, no recent Etoh    Pulmonary Medications:   guaiFENesin ER 1200 milliGRAM(s) Oral every 12 hours      Antimicrobials:  cefTRIAXone   IVPB 2000 milliGRAM(s) IV Intermittent every 24 hours  doxycycline monohydrate Capsule 100 milliGRAM(s) Oral every 12 hours      Cardiology:  carvedilol 3.125 milliGRAM(s) Oral every 12 hours  lisinopril 20 milliGRAM(s) Oral daily      Other:  acetaminophen     Tablet .. 650 milliGRAM(s) Oral every 6 hours PRN  ALPRAZolam 0.25 milliGRAM(s) Oral every 8 hours PRN  artificial  tears Solution 1 Drop(s) Both EYES four times a day PRN  dextrose 5% + sodium chloride 0.9%. 1000 milliLiter(s) IV Continuous <Continuous>  enoxaparin Injectable 40 milliGRAM(s) SubCutaneous every 24 hours  simvastatin 40 milliGRAM(s) Oral at bedtime  sodium chloride 0.9%. 1000 milliLiter(s) IV Continuous <Continuous>      Allergies    No Known Allergies    Intolerances        HOME MEDICATIONS:    REVIEW OF SYSTEMS:      Constitutional: Within normal limits  HEENT: Within normal limits  Neck: Within normal limits  Resp: mild intermittent dyspnea on exertion, no current report of chest congestion/wheeze, cough. stridor, sputum production, chest pain with respiration, hemoptysis  CV:  Chest pain resolved, no palpitations, lightheadedness/dizziness, syncope, lower extremity edema, inability to lay flat to sleep, awakening from sleep unable to sleep, claudication.  Extremities: Within normal limits  GI: Within normal limits  : Within normal limits   Psych/Neurological: Within normal limits  Musculoskeletal: Within normal limits  Hematologic: Within normal limits  Endocrinologic: Within normal limits   Skin: Within normal limits                                                                                                                                                                                [ ]Unable to obtain    OBJECTIVE:      ICU Vital Signs Last 24 Hrs  T(C): 36.8 (13 Aug 2022 13:24), Max: 39.6 (12 Aug 2022 22:30)  T(F): 98.3 (13 Aug 2022 13:24), Max: 103.2 (12 Aug 2022 22:30)  HR: 75 (13 Aug 2022 13:24) (62 - 75)  BP: 119/57 (13 Aug 2022 13:24) (105/51 - 139/75)  BP(mean): --  ABP: --  ABP(mean): --  RR: 18 (13 Aug 2022 13:24) (17 - 20)  SpO2: 97% (13 Aug 2022 13:) (92% - 97%)    O2 Parameters below as of 13 Aug 2022 13:24  Patient On (Oxygen Delivery Method): room air            I&O's Detail    12 Aug 2022 07:01  -  13 Aug 2022 07:00  --------------------------------------------------------  IN:    IV PiggyBack: 200 mL    sodium chloride 0.9%: 525 mL  Total IN: 725 mL    OUT:    Voided (mL): 200 mL  Total OUT: 200 mL    Total NET: 525 mL      13 Aug 2022 07:01  -  13 Aug 2022 15:19  --------------------------------------------------------  IN:    Oral Fluid: 640 mL  Total IN: 640 mL    OUT:    Voided (mL): 700 mL  Total OUT: 700 mL    Total NET: -60 mL        Daily     Daily   CAPILLARY BLOOD GLUCOSE      POCT Blood Glucose.: 107 mg/dL (13 Aug 2022 08:58)      PHYSICAL EXAM:  General: Awake, alert, cooperative, no distress, appears stated age   Head: Atraumatic, normocephalic  Eyes: grossly unremarkable          Ears: External examination WNL, both ears                Nose: Nares normal, septum midline, mucosa normal, no drainage or sinus tenderness  Throat/oropharynx- grossly unremarkable  Neck: Supple, symmetric, trachea midline; thyroid without enlargement/tenderness/nodules; no carotid bruit; no JVD  Back: Symmetric, no curvature, range of motion normal, no CVA tenderness  Chest wall: No tenderness or deformity  Respiratory: Respirations unlabored;  prolonged expiratory phase, decreased breath sounds at the bases  Cardiovascular: Regular rate without rub or gallop  Abdomen: Soft, non-tender, non-distended. No organomegaly. No masses. Normal bowel sounds  Extremities: Warm to touch. No clubbing or cyanosis. No pedal edema.  Pulses: grossly unremarkable  Skin: Normal skin color, texture and turgor. No rashes or lesions  Lymph Nodes: unremarkable  Neurological: Motor and sensory examination equal and normal. A and O x 3  Psychiatry: Appropriate mood and affect.    LABS:                            10.4   74.59 )-----------( 154      ( 13 Aug 2022 05:59 )             33.7         138  |  104  |  18  ----------------------------<  138<H>  4.3   |  24  |  0.58    Ca    9.0      13 Aug 2022 05:58    TPro  6.8  /  Alb  4.0  /  TBili  0.6  /  DBili  x   /  AST  19  /  ALT  15  /  AlkPhos  84      PT/INR - ( 11 Aug 2022 17:18 )   PT: 12.2 sec;   INR: 1.05 ratio         PTT - ( 11 Aug 2022 17:18 )  PTT:29.8 sec  Urinalysis Basic - ( 11 Aug 2022 19:12 )    Color: Yellow / Appearance: Clear / S.037 / pH: x  Gluc: x / Ketone: Small  / Bili: Negative / Urobili: Negative   Blood: x / Protein: 30 mg/dL / Nitrite: Negative   Leuk Esterase: Negative / RBC: 6 /hpf / WBC 2 /HPF   Sq Epi: x / Non Sq Epi: 3 /hpf / Bacteria: 0.0      Venous Blood Gas:   @ 17:15  7.40/44/26/27/39.3  VBG Lactate: 1.2        MICROBIOLOGY:     RADIOLOGY:  [ ] Reviewed and interpreted by me    CXR:  ACC: [id number] EXAM:  XR CHEST PORTABLE URGENT 1V                          PROCEDURE DATE:  2022          INTERPRETATION:  EXAMINATION: XR CHEST URGENT    CLINICAL INDICATION: Sepsis    TECHNIQUE: Single frontal, portable view of the chest wasobtained.    COMPARISON: Chest X-Ray 2020.    FINDINGS:      The heart is normal in size.  Hazy right upper lobe opacity.  There is no pneumothorax.  No acute bony abnormality.    IMPRESSION:  Hazy right upper lobe opacity, concerning for pneumonia. Clinical   correlation recommended.    --- End of Report ---        CT Scan:  ACC: [id number] EXAM:  CT CHEST                          PROCEDURE DATE:  2022          INTERPRETATION:  CLINICAL INFORMATION: Fever    COMPARISON: CT chest abdomen pelvis 10/27/2014    CONTRAST/COMPLICATIONS:  IV Contrast: NONE  Oral Contrast:NONE  Complications: None reported at time of study completion    PROCEDURE:  CT scan of the chest was obtained without intravenous contrast.    FINDINGS:    LYMPH NODES: A few enlarged mediastinal lymph nodes with the largest   being a lower paratracheal node measuring up to 1.9 cm (2, 44).    HEART/VASCULATURE: Heart size normal. No pericardial effusion. Scattered   coronary artery calcifications. Aortic calcifications.    AIRWAYS/LUNGS/PLEURA: The central airways are patent. There is a mild   consolidative and groundglass opacity in the right upper lobe. No pleural   effusions.    UPPER ABDOMEN: There is diffuse hypoattenuation of the hepatic   parenchyma, consistent with hepatic steatosis. Small hiatal hernia.   Cholelithiasis. There are two peripherally calcified probable splenic   artery aneurysms, with the largest measuring 7 mm.    BONES/SOFT TISSUES: Degenerative changes.    IMPRESSION:    Mild consolidative and groundglass opacity in the right upper lobe,   likely representing pneumonia. Follow-up noncontrast chest CT can be   obtained in 4-6 weeks to assess for resolution if clinically indicated.    A few enlarged mediastinal lymph nodes measuring up to 1.9 cm, likely   reactive.    --- End of Report ---           ELSY GONZALEZ MD; Resident Radiologist  This document has been electronically signed.   BRAD GRIFFIN DO; Attending Radiologist  This document has been electronically signed. Aug 12 2022 10:06AM

## 2022-08-13 NOTE — PROGRESS NOTE ADULT - ASSESSMENT
72 f with    Sepsis/ Pneumonia  - antibiotic  - ID follow     CLL  - Hem Onc eval noted    HTN control    Hypercholesterolemia   - stable    Anxiety  - control    DVT prophylaxis    Clayton Wills MD phone [phone]  72 f with    Sepsis/ Pneumonia  - antibiotic  - ID follow   - Pulmonary evaluation    CLL  - Hem Onc eval noted    HTN control    Hypercholesterolemia   - stable    Anxiety  - control    DVT prophylaxis    Clayton Wills MD phone [phone]

## 2022-08-13 NOTE — PATIENT PROFILE ADULT - NSPROMEDSBROUGHTTOHOSP_GEN_A_NUR
"Chief Complaint  Iron deficiency anemia, history of left lower extremity calf DVT with suspected pulmonary embolism    Subjective        History of Present Illness  Patient returns today for a 6-month follow-up visit with labs to review.  She had been asked to take her oral iron every other day.  Patient reports however that she has not been very compliant with this and more often than not does not take her iron as she does forget the medication.  She has not experienced any significant side effects however from taking her iron, denies any nausea or constipation issues.  The patient has had multiple other medical issues since her last visit here.  She has had 2 hospitalizations, 1 in May 2021 for a Klebsiella UTI and another in September for CHF exacerbation and what was felt to be lower extremity cellulitis.  In fact, that is now felt to represent a stasis dermatitis which is being treated by her primary care physician.  Patient remains in a wheelchair today, has a walker that she uses for short distances of ambulation.  She has no other current specific complaints.      Objective   Vital Signs:   /81   Pulse 89   Temp 97.7 °F (36.5 °C) (Temporal)   Resp 18   Ht 157.5 cm (62.01\")   Wt 113 kg (250 lb)   SpO2 90%   BMI 45.71 kg/m²     Physical Exam  Constitutional:       Appearance: She is well-developed.      Comments: Patient is seated in a wheelchair no distress   Eyes:      Conjunctiva/sclera: Conjunctivae normal.   Neck:      Thyroid: No thyromegaly.   Cardiovascular:      Rate and Rhythm: Normal rate and regular rhythm.      Heart sounds: No murmur heard.   No friction rub. No gallop.    Pulmonary:      Effort: No respiratory distress.      Breath sounds: Normal breath sounds.   Abdominal:      General: Bowel sounds are normal. There is no distension.      Palpations: Abdomen is soft.      Tenderness: There is no abdominal tenderness.   Musculoskeletal:         General: Swelling present.      " Comments: 1-2+ bilateral lower extremity edema.  Diffuse erythroderma involving the lower extremities consistent with a stasis dermatitis   Lymphadenopathy:      Head:      Right side of head: No submandibular adenopathy.      Cervical: No cervical adenopathy.      Upper Body:      Right upper body: No supraclavicular adenopathy.      Left upper body: No supraclavicular adenopathy.   Skin:     General: Skin is warm and dry.      Findings: No rash.   Neurological:      Mental Status: She is alert and oriented to person, place, and time.      Cranial Nerves: No cranial nerve deficit.      Motor: No abnormal muscle tone.      Deep Tendon Reflexes: Reflexes normal.   Psychiatric:         Behavior: Behavior normal.         Result Review : Reviewed labs from 9/29/2021 with iron panel, ferritin, CBC.  Reviewed discharge summaries, laboratory studies from hospitalizations.     Assessment and Plan      1. Iron deficiency anemia:  · The patient has had a long-standing degree of anemia.  Anemia appears to have worsened following her hospitalization for bowel obstruction and sepsis in late 2017.  Previous hemoglobin in the 7-8 range during hospitalization in late 2017, subsequent increase into the 9-10 range in early 2018 with gradual decline into the 8-9 range.  · In 2018, the patient developed declining MCV which has gradually decreased to 63.5 2/6/19    · Patient had concurrent thrombocytosis with platelet count in the high 400-500,000 range, subsequently increased to 600,000 range. Related to underlying iron deficiency.  · Patient developed associated PICA symptom of ice craving.  · There was no visible evidence of GI blood loss however patient remains on chronic anticoagulation with Eliquis.  She had never undergone EGD or colonoscopy.  Stool cards negative for occult blood on 2/27/19.    · Anemia evaluation 2/6/19 at initial visit with hemoglobin 8.6, MCV 63.5, platelet count 694,000, iron 18, ferritin less than 5, iron  saturation 3%, TIBC 584, B12 380, folate greater than 20, creatinine 0.74, ESR 30, REBECCA negative, haptoglobin 324, .  · In order to facilitate rapid correction of her iron deficiency for anticipated knee surgery, administered IV iron in the form of Injectafer 750mg on 2/12 and 2/19/19.  · EGD and colonoscopy 3/4/19 with no evidence of bleeding source, evidence of hiatal hernia, gastritis, benign colonic polyps.  · Pica symptoms resolved  · Labs on 2/27/2019 with ferritin 617, iron saturation 16%, hemoglobin 10.4  · Labs on 11/26/2019 with stable hemoglobin at 12.8.  Iron studies however showed iron 29, ferritin that declined from 151.9 down to 62.3, iron saturation down to 7% with TIBC 391.    · Labs 2/25/2020 showed improving hemoglobin up to 13.3.  Iron was 33, ferritin stable at 63.2, iron saturation 8%, TIBC 412.    · Labs 9/15/2020 showed stable hemoglobin at 12.9, iron studies fairly stable with iron 52, ferritin 48.6, iron saturation 11%, TIBC 473.  The patient reported that she had never really tried oral iron in the past. Initiated oral iron every other day on 9/24/2020.  · Patient was admitted 4/3-4/6/2021 at [facility] with report of heme positive stool in the emergency department and question of GI bleed however ultimately was found to have urinary tract infection.  Labs on 9/29/2021 showed hemoglobin of 12.6 and improvement in her iron studies with iron 46, ferritin 85.1, iron saturation 10%, TIBC 445.  She was tolerating oral iron well with no significant GI side effects.  We did discuss the issue of the heme positive stool from emergency department.  Given her underlying comorbidities she was not felt to be a good candidate for endoscopic evaluation.  · Patient returns today in follow-up with labs performed at the 6-month interval 9/29/2021.  These show hemoglobin that is slightly low at 11.7, iron 43, ferritin that has declined slightly from 85.1 down to 57.9, iron saturation stable at 10%, TIBC  426.  The patient was to have been taking oral iron every other day however reports that most of the time she forgets to take the medication and is really had very little oral iron.  She has not experienced any significant adverse effects from the iron.  We discussed resuming iron and taking this on a daily basis for now.  She is to notify us if she is unable to tolerate the iron as she would qualify for IV iron at this point.  I will see her back in 4 months and we will reassess her iron status and again further discuss whether we need to move on to IV iron.  2. Thrombocytosis:  · As above, the patient had a platelet count in the high 400-500,000 range since late 2017.  This progressively increased to 809,000 on 2/12/19.  · Secondary to iron deficiency anemia  · Platelet count improved following IV iron administration  · Platelet count currently 478,000, elevated related to iron deficiency  3. Left lower extremity DVT (popliteal/calf) with suspected pulmonary embolism:  · Patient was admitted in November/December 2017 with small bowel perforation requiring abdominal surgery and subsequent sepsis  · Lower extremity Doppler 12/1/17 with popliteal and calf DVT on the left  · V/Q scan 12/2/17 with intermediate probability for pulmonary embolism  · Patient was anticoagulated and has continued on treatment with Eliquis 5mg bid.  She is now over one year out from her thrombosis that was provoked.  · Patient reports that her daughter developed DVT and pulmonary embolism in her 30s and apparently has an IVC filter in place.  She is a poor historian, accuracy is unknown.  Unclear whether her daughter has been evaluated with hypercoagulable labs.  · Given the family history, we did proceed with hypercoagulable evaluation 2/6/19: Negative factor V Leiden mutation, negative prothrombin gene mutation, protein C activity 192%, protein S activity 127%, Antithrombin III greater than 140, lupus anticoagulant negative,  anticardiolipin antibody panel negative, anti-beta-2 GP 1 antibody panel negative.  · Hypercoagulable evaluation was negative.  Given the provoked nature of her previous thrombosis, and having been anticoagulated for over one year, on 2/27/19, recommended that she discontinue anticoagulation.  We did discuss signs and symptoms of recurrent DVT and pulmonary embolism and the need to seek immediate medical attention if these occur.  · Right total knee arthroplasty 4/8/2019.  Patient received Eliquis 5 mg twice daily postoperatively.  It was anticipated that she would only require brief treatment with Eliquis however her recovery from her knee replacement was prolonged. She did develop some pain and swelling in the right knee and underwent lower extremity Doppler on 6/20/2019 which was negative for DVT, showed some valvular incompetence.  With eventual improvement in mobility, Eliquis discontinued 8/6/2019.  · Patient does have fairly limited mobility at this point, ambulates with the use of a walker, is in a wheelchair in the office today.  There does not however appear to be any indication to resume anticoagulation.  4. CHF  5. Bilateral lower extremity stasis dermatitis     Plan:     1. Increase oral iron to daily dosing  2. The patient will notify us if she has difficulty tolerating oral iron from a GI standpoint  3. MD visit in 4 months, 1 week prior we will draw labs with CBC, iron panel, ferritin.                no

## 2022-08-13 NOTE — CONSULT NOTE ADULT - ASSESSMENT
71 y/o female w/ PMH CLL treatment naive 13q deletion stage 1 (baseline ) HTN and HLD c/o 3 day history of worsening fever and urinary frequency and 1 day history of chest pain and SOB.    #Stage 1 CLL  #Sepsis  - treatment naive del 13q    - no splenomegaly on prior imaging  - has had stable disease and asymptomatic   - 5/10/22 CBC:  Hgb 13.3 Plt 139 ALC 89.63 ANC 4.86  - presented with   ANC 5.66 Hgb 11.5 Plt 174  - febrile with CT imaging consistent with patchy consolidations, likely pneumonia   - UA negative   - f/u cultures   - antibiotics   - quantitative immunoglobulins sent, if IgG low and not improving can consider IVIG administration   - no further work up inpatient from a hematologic perspective and no indication for CLL directed therapy at this time.   - to continue observation with outpatient hematologist Dr. Rojo who is aware of her admission after discharge     Jose Raul Melton MD  Hematology/Oncology Fellow   pager [phone]  After 5pm and on weekends page on call fellow 
Impression:  The patient has a history of CLL, she was admitted with symptoms of systemic illness, she was found to have pneumonia on both CxR and chest CT.  She is currently responding to antibiotics.    Plan:  Continue broad spectrum antibiotics.  Supplemental O2 to maintain saturations 92% or above prn, watching for evidence of CO2 retention.  ID and heme/onc are consulting.  Elevating HOB   Incentive spirometry  Encouraging secretion clearance/pulmonary toilet prn  Mobilization as is feasible.  Following CxR.  Eventual follow up chest CT (6 to 12 weeks after completing treatment as long as she remains stable otherwise.  Routine prophylactic measures.   Judicious CV/fluid management is ongoing.     Thank you for this consult.   Will follow.

## 2022-08-14 LAB
ANION GAP SERPL CALC-SCNC: 9 MMOL/L — SIGNIFICANT CHANGE UP (ref 5–17)
BUN SERPL-MCNC: 13 MG/DL — SIGNIFICANT CHANGE UP (ref 7–23)
CALCIUM SERPL-MCNC: 9.2 MG/DL — SIGNIFICANT CHANGE UP (ref 8.4–10.5)
CHLORIDE SERPL-SCNC: 106 MMOL/L — SIGNIFICANT CHANGE UP (ref 96–108)
CO2 SERPL-SCNC: 23 MMOL/L — SIGNIFICANT CHANGE UP (ref 22–31)
CREAT SERPL-MCNC: 0.58 MG/DL — SIGNIFICANT CHANGE UP (ref 0.5–1.3)
EGFR: 97 ML/MIN/1.73M2 — SIGNIFICANT CHANGE UP
GLUCOSE SERPL-MCNC: 94 MG/DL — SIGNIFICANT CHANGE UP (ref 70–99)
HCT VFR BLD CALC: 32.9 % — LOW (ref 34.5–45)
HGB BLD-MCNC: 10 G/DL — LOW (ref 11.5–15.5)
MCHC RBC-ENTMCNC: 28.4 PG — SIGNIFICANT CHANGE UP (ref 27–34)
MCHC RBC-ENTMCNC: 30.4 GM/DL — LOW (ref 32–36)
MCV RBC AUTO: 93.5 FL — SIGNIFICANT CHANGE UP (ref 80–100)
NRBC # BLD: 0 /100 WBCS — SIGNIFICANT CHANGE UP (ref 0–0)
PLATELET # BLD AUTO: 159 K/UL — SIGNIFICANT CHANGE UP (ref 150–400)
POTASSIUM SERPL-MCNC: 4.4 MMOL/L — SIGNIFICANT CHANGE UP (ref 3.5–5.3)
POTASSIUM SERPL-SCNC: 4.4 MMOL/L — SIGNIFICANT CHANGE UP (ref 3.5–5.3)
RBC # BLD: 3.52 M/UL — LOW (ref 3.8–5.2)
RBC # FLD: 15.2 % — HIGH (ref 10.3–14.5)
SODIUM SERPL-SCNC: 138 MMOL/L — SIGNIFICANT CHANGE UP (ref 135–145)
WBC # BLD: 68.17 K/UL — CRITICAL HIGH (ref 3.8–10.5)
WBC # FLD AUTO: 68.17 K/UL — CRITICAL HIGH (ref 3.8–10.5)

## 2022-08-14 RX ADMIN — SIMVASTATIN 40 MILLIGRAM(S): 20 TABLET, FILM COATED ORAL at 21:36

## 2022-08-14 RX ADMIN — ENOXAPARIN SODIUM 40 MILLIGRAM(S): 100 INJECTION SUBCUTANEOUS at 09:06

## 2022-08-14 RX ADMIN — CARVEDILOL PHOSPHATE 3.12 MILLIGRAM(S): 80 CAPSULE, EXTENDED RELEASE ORAL at 05:10

## 2022-08-14 RX ADMIN — Medication 100 MILLIGRAM(S): at 18:33

## 2022-08-14 RX ADMIN — CARVEDILOL PHOSPHATE 3.12 MILLIGRAM(S): 80 CAPSULE, EXTENDED RELEASE ORAL at 18:34

## 2022-08-14 RX ADMIN — CEFTRIAXONE 100 MILLIGRAM(S): 500 INJECTION, POWDER, FOR SOLUTION INTRAMUSCULAR; INTRAVENOUS at 18:33

## 2022-08-14 RX ADMIN — Medication 100 MILLIGRAM(S): at 05:10

## 2022-08-14 RX ADMIN — Medication 1200 MILLIGRAM(S): at 05:09

## 2022-08-14 RX ADMIN — Medication 1200 MILLIGRAM(S): at 18:33

## 2022-08-14 RX ADMIN — LISINOPRIL 20 MILLIGRAM(S): 2.5 TABLET ORAL at 05:10

## 2022-08-14 NOTE — PROGRESS NOTE ADULT - SUBJECTIVE AND OBJECTIVE BOX
Patient is a 72y old  Female who presents with a chief complaint of Sepsis (13 Aug 2022 15:18)      SUBJECTIVE / OVERNIGHT EVENTS:  Review of Systems  chest pain no  palpitations no  sob no  nausea no  headache no    MEDICATIONS  (STANDING):  carvedilol 3.125 milliGRAM(s) Oral every 12 hours  cefTRIAXone   IVPB 2000 milliGRAM(s) IV Intermittent every 24 hours  dextrose 5% + sodium chloride 0.9%. 1000 milliLiter(s) (75 mL/Hr) IV Continuous <Continuous>  doxycycline monohydrate Capsule 100 milliGRAM(s) Oral every 12 hours  enoxaparin Injectable 40 milliGRAM(s) SubCutaneous every 24 hours  guaiFENesin ER 1200 milliGRAM(s) Oral every 12 hours  lisinopril 20 milliGRAM(s) Oral daily  simvastatin 40 milliGRAM(s) Oral at bedtime  sodium chloride 0.9%. 1000 milliLiter(s) (75 mL/Hr) IV Continuous <Continuous>    MEDICATIONS  (PRN):  acetaminophen     Tablet .. 650 milliGRAM(s) Oral every 6 hours PRN Temp greater or equal to 38.5C (101.3F), Mild Pain (1 - 3)  ALPRAZolam 0.25 milliGRAM(s) Oral every 8 hours PRN anxiety  artificial  tears Solution 1 Drop(s) Both EYES four times a day PRN Dry Eyes      Vital Signs Last 24 Hrs  T(C): 36.9 (14 Aug 2022 09:12), Max: 37.8 (13 Aug 2022 16:44)  T(F): 98.4 (14 Aug 2022 09:12), Max: 100 (13 Aug 2022 16:44)  HR: 95 (14 Aug 2022 09:12) (65 - 95)  BP: 139/64 (14 Aug 2022 09:12) (111/51 - 139/64)  BP(mean): --  RR: 18 (14 Aug 2022 09:12) (18 - 18)  SpO2: 95% (14 Aug 2022 09:12) (95% - 97%)    Parameters below as of 14 Aug 2022 09:12  Patient On (Oxygen Delivery Method): room air        PHYSICAL EXAM:  GENERAL: NAD, well-developed  HEAD:  Atraumatic, Normocephalic  EYES: EOMI, PERRLA, conjunctiva and sclera clear  NECK: Supple, No JVD  CHEST/LUNG: few R crackles  No wheeze  HEART: Regular rate and rhythm; No murmurs, rubs, or gallops  ABDOMEN: Soft, Nontender, Nondistended; Bowel sounds present  EXTREMITIES:  2+ Peripheral Pulses, No clubbing, cyanosis, or edema  PSYCH: AAOx3  NEUROLOGY: non-focal  SKIN: No rashes or lesions    LABS:                        10.0   68.17 )-----------( 159      ( 14 Aug 2022 07:08 )             32.9     08-14    138  |  106  |  13  ----------------------------<  94  4.4   |  23  |  0.58    Ca    9.2      14 Aug 2022 07:11                Culture - Sputum (collected 13 Aug 2022 16:18)  Source: .Sputum Sputum  Gram Stain (13 Aug 2022 22:41):    Few Squamous epithelial cells per low power field    Few polymorphonuclear leukocytes per low power field    Few Gram positive cocci in pairs per oil power field    Few Gram Negative Rods per oil power field    Culture - Urine (collected 11 Aug 2022 19:12)  Source: Clean Catch Clean Catch (Midstream)  Final Report (13 Aug 2022 07:18):    No growth    Culture - Blood (collected 11 Aug 2022 17:15)  Source: .Blood Blood-Peripheral  Preliminary Report (12 Aug 2022 23:02):    No growth to date.    Culture - Blood (collected 11 Aug 2022 17:00)  Source: .Blood Blood-Peripheral  Preliminary Report (12 Aug 2022 23:02):    No growth to date.        RADIOLOGY & ADDITIONAL TESTS:    Imaging Personally Reviewed:    Consultant(s) Notes Reviewed:      Care Discussed with Consultants/Other Providers:

## 2022-08-14 NOTE — PROGRESS NOTE ADULT - SUBJECTIVE AND OBJECTIVE BOX
CC: f/u for  pneumonia  Patient reports feels well    REVIEW OF SYSTEMS:  All other review of systems negative (Comprehensive ROS)    Antimicrobials Day #  :4/10  cefTRIAXone   IVPB 2000 milliGRAM(s) IV Intermittent every 24 hours  doxycycline monohydrate Capsule 100 milliGRAM(s) Oral every 12 hours    Other Medications Reviewed    T(F): 99.1 (08-14-22 @ 16:07), Max: 100 (08-13-22 @ 20:52)  HR: 77 (08-14-22 @ 16:07)  BP: 126/65 (08-14-22 @ 16:07)  RR: 18 (08-14-22 @ 16:07)  SpO2: 97% (08-14-22 @ 16:07)  Wt(kg): --    PHYSICAL EXAM:  General: alert, no acute distress  Eyes:  anicteric, no conjunctival injection, no discharge  Oropharynx: no lesions or injection 	  Neck: supple, without adenopathy  Lungs: clear to auscultation  Heart: regular rate and rhythm; no murmur, rubs or gallops  Abdomen: soft, nondistended, nontender, without mass or organomegaly  Skin: no lesions  Extremities: no clubbing, cyanosis, or edema  Neurologic: alert, oriented, moves all extremities    LAB RESULTS:                        10.0   68.17 )-----------( 159      ( 14 Aug 2022 07:08 )             32.9     08-14    138  |  106  |  13  ----------------------------<  94  4.4   |  23  |  0.58    Ca    9.2      14 Aug 2022 07:11          MICROBIOLOGY:  RECENT CULTURES:  08-13 @ 16:18 .Sputum Sputum     Normal Respiratory Alok present    Few Squamous epithelial cells per low power field  Few polymorphonuclear leukocytes per low power field  Few Gram positive cocci in pairs per oil power field  Few Gram Negative Rods per oil power field    08-11 @ 19:12 Clean Catch Clean Catch (Midstream)     No growth      08-11 @ 17:15 .Blood Blood-Peripheral     No growth to date.      08-11 @ 17:00 .Blood Blood-Peripheral     No growth to date.      Legionella Antigen, Urine: Negative: Positive Testing method: Immunochromatographic Assay.     RADIOLOGY REVIEWED:    < from: CT Chest No Cont (08.11.22 @ 19:46) >    ACC: [id number] EXAM:  CT CHEST                          PROCEDURE DATE:  08/11/2022          INTERPRETATION:  CLINICAL INFORMATION: Fever    COMPARISON: CT chest abdomen pelvis 10/27/2014    CONTRAST/COMPLICATIONS:  IV Contrast: NONE  Oral Contrast:NONE  Complications: None reported at time of study completion    PROCEDURE:  CT scan of the chest was obtained without intravenous contrast.    FINDINGS:    LYMPH NODES: A few enlarged mediastinal lymph nodes with the largest   being a lower paratracheal node measuring up to 1.9 cm (2, 44).    HEART/VASCULATURE: Heart size normal. No pericardial effusion. Scattered   coronary artery calcifications. Aortic calcifications.    AIRWAYS/LUNGS/PLEURA: The central airways are patent. There is a mild   consolidative and groundglass opacity in the right upper lobe. No pleural   effusions.    UPPER ABDOMEN: There is diffuse hypoattenuation of the hepatic   parenchyma, consistent with hepatic steatosis. Small hiatal hernia.   Cholelithiasis. There are two peripherally calcified probable splenic   artery aneurysms, with the largest measuring 7 mm.    BONES/SOFT TISSUES: Degenerative changes.    IMPRESSION:    Mild consolidative and groundglass opacity in the right upper lobe,   likely representing pneumonia. Follow-up noncontrast chest CT can be   obtained in 4-6 weeks to assess for resolution if clinically indicated.    A few enlarged mediastinal lymph nodes measuring up to 1.9 cm, likely   reactive.    --- End of Report ---           ELSY GONZALEZ MD; Resident Radiologist  This document has been electronically signed.   BRAD GRIFFIN DO; Attending Radiologist  This document has been electronically signed. Aug 12 2022 10:06AM    < end of copied text >            Assessment:tient with CLL not on tx admitted with fever for a few days, mil cough, found to have pneumonia, clinically improved on ctx and doxycycline. legionella neg cx neg , rvp neg, sp  with usual respiratory alok so far. She is doing well  Plan:  continue ceftriaxone and doxy  f/u final cultures  once afebrile full 24 hour po antibiotic, hope for tomorrow, ceftin and doxycycline for another week   igg level is a bit over 400, would like her to get iv ig infusions, heme follows  heme f/u for cll after d/c    Plan:

## 2022-08-14 NOTE — PROGRESS NOTE ADULT - ASSESSMENT
Impression:  The patient has a history of CLL, she was admitted with symptoms of systemic illness, she was found to have pneumonia on both CxR and chest CT.  She is currently responding to antibiotics.    Plan:  Continue broad spectrum antibiotics.  Supplemental O2 to maintain saturations 92% or above prn, watching for evidence of CO2 retention.  ID and heme/onc are consulting.  Elevating HOB   Incentive spirometry  Encouraging secretion clearance/pulmonary toilet prn  Mobilization as is feasible.  Following CxR.  Eventual follow up chest CT (6 to 12 weeks after completing treatment as long as she remains stable otherwise.  Routine prophylactic measures.   Judicious CV/fluid management is ongoing.     Thank you for this consult.   Will follow.  Impression:  The patient has a history of CLL, she was admitted with symptoms of systemic illness, she was found to have pneumonia on both CxR and chest CT.  She is currently responding to antibiotics.    Plan:  As outlined previously, ontinue broad spectrum antibiotics.  Supplemental O2 to maintain saturations 92% or above prn, watching for evidence of CO2 retention.  ID and heme/onc are consulting.  Elevating HOB   Incentive spirometry  Encouraging secretion clearance/pulmonary toilet prn  Mobilization as is feasible.  Following CxR.  Eventual follow up chest CT (6 to 12 weeks after completing treatment as long as she remains stable otherwise.  Routine prophylactic measures.   Judicious CV/fluid management is ongoing.

## 2022-08-14 NOTE — PROGRESS NOTE ADULT - ASSESSMENT
72 f with    Sepsis/ Pneumonia  - antibiotic  - ID follow   - Pulmonary evaluation noted    CLL  - Hem Onc eval noted    HTN control    Hypercholesterolemia   - stable    Anxiety  - control    DVT prophylaxis    Clayton Wills MD phone [phone]

## 2022-08-14 NOTE — PROGRESS NOTE ADULT - SUBJECTIVE AND OBJECTIVE BOX
Follow-up Pulm Progress Note    The patient was seen and examined. Notes reviewed and discussed with staff/team as applicable      No new respiratory events overnight.      Denies: SOB, Chest pain, increased cough, colored phlegm, hemoptysis, N/V/D, neck stiffness, dysuria  ROS otherwise within normal limits    Vital Signs Last 24 Hrs  T(C): 37.3 (14 Aug 2022 16:07), Max: 37.8 (13 Aug 2022 20:52)  T(F): 99.1 (14 Aug 2022 16:07), Max: 100 (13 Aug 2022 20:52)  HR: 77 (14 Aug 2022 16:07) (65 - 95)  BP: 126/65 (14 Aug 2022 16:07) (124/59 - 144/65)  BP(mean): --  RR: 18 (14 Aug 2022 16:07) (18 - 18)  SpO2: 97% (14 Aug 2022 16:07) (95% - 97%)    Parameters below as of 14 Aug 2022 16:07  Patient On (Oxygen Delivery Method): room air              08-13 @ 07:01  -  08-14 @ 07:00  --------------------------------------------------------  IN: 690 mL / OUT: 1200 mL / NET: -510 mL          Medications:  MEDICATIONS  (STANDING):  carvedilol 3.125 milliGRAM(s) Oral every 12 hours  cefTRIAXone   IVPB 2000 milliGRAM(s) IV Intermittent every 24 hours  dextrose 5% + sodium chloride 0.9%. 1000 milliLiter(s) (75 mL/Hr) IV Continuous <Continuous>  doxycycline monohydrate Capsule 100 milliGRAM(s) Oral every 12 hours  enoxaparin Injectable 40 milliGRAM(s) SubCutaneous every 24 hours  guaiFENesin ER 1200 milliGRAM(s) Oral every 12 hours  lisinopril 20 milliGRAM(s) Oral daily  simvastatin 40 milliGRAM(s) Oral at bedtime  sodium chloride 0.9%. 1000 milliLiter(s) (75 mL/Hr) IV Continuous <Continuous>    MEDICATIONS  (PRN):  acetaminophen     Tablet .. 650 milliGRAM(s) Oral every 6 hours PRN Temp greater or equal to 38.5C (101.3F), Mild Pain (1 - 3)  ALPRAZolam 0.25 milliGRAM(s) Oral every 8 hours PRN anxiety  artificial  tears Solution 1 Drop(s) Both EYES four times a day PRN Dry Eyes  artificial tears (preservative free) Ophthalmic Solution 1 Drop(s) Both EYES five times a day PRN Dry Eyes      Allergies    No Known Allergies    Intolerances        Vent settings (if applicable)        Physical Examination:    Pleasant  Neck: no JVD, LAD, accessory muscle use  PULM: Clear to auscultation bilaterally, no wheezes, rales, rhonchi  CVS: Regular rate and rhythm, S1S2, no murmurs, rubs, or gallops  Abdomen:  Extremities:  Neuro:      LABS:                        10.0   68.17 )-----------( 159      ( 14 Aug 2022 07:08 )             32.9     08-14    138  |  106  |  13  ----------------------------<  94  4.4   |  23  |  0.58    Ca    9.2      14 Aug 2022 07:11            CAPILLARY BLOOD GLUCOSE      POCT Blood Glucose.: 107 mg/dL (13 Aug 2022 08:58)              CULTURES:  Culture Results:   Normal Respiratory Patty present (08-13 @ 16:18)  Culture Results:   No growth (08-11 @ 19:12)  Culture Results:   No growth to date. (08-11 @ 17:15)  Culture Results:   No growth to date. (08-11 @ 17:00)    Most recent blood culture -- 08-13 @ 16:18   -- -- .Sputum Sputum 08-13 @ 16:18  Most recent blood culture -- 08-11 @ 19:12   -- -- Clean Catch Clean Catch (Midstream) 08-11 @ 19:12  Most recent blood culture -- 08-11 @ 17:15   -- -- .Blood Blood-Peripheral 08-11 @ 17:15  Most recent blood culture -- 08-11 @ 17:00   -- -- .Blood Blood-Peripheral 08-11 @ 17:00      RADIOLOGY REVIEWED    CXR:      CT chest:      Other:   Follow-up Pulm Progress Note    The patient was seen and examined. Notes reviewed and discussed with staff/team as applicable      No new respiratory events overnight.      Denies: SOB, Chest pain, increased cough, colored phlegm, hemoptysis, N/V/D, neck stiffness, dysuria  ROS otherwise within normal limits    Vital Signs Last 24 Hrs  T(C): 37.3 (14 Aug 2022 16:07), Max: 37.8 (13 Aug 2022 20:52)  T(F): 99.1 (14 Aug 2022 16:07), Max: 100 (13 Aug 2022 20:52)  HR: 77 (14 Aug 2022 16:07) (65 - 95)  BP: 126/65 (14 Aug 2022 16:07) (124/59 - 144/65)  BP(mean): --  RR: 18 (14 Aug 2022 16:07) (18 - 18)  SpO2: 97% (14 Aug 2022 16:07) (95% - 97%)    Parameters below as of 14 Aug 2022 16:07  Patient On (Oxygen Delivery Method): room air              08-13 @ 07:01  -  08-14 @ 07:00  --------------------------------------------------------  IN: 690 mL / OUT: 1200 mL / NET: -510 mL          Medications:  MEDICATIONS  (STANDING):  carvedilol 3.125 milliGRAM(s) Oral every 12 hours  cefTRIAXone   IVPB 2000 milliGRAM(s) IV Intermittent every 24 hours  dextrose 5% + sodium chloride 0.9%. 1000 milliLiter(s) (75 mL/Hr) IV Continuous <Continuous>  doxycycline monohydrate Capsule 100 milliGRAM(s) Oral every 12 hours  enoxaparin Injectable 40 milliGRAM(s) SubCutaneous every 24 hours  guaiFENesin ER 1200 milliGRAM(s) Oral every 12 hours  lisinopril 20 milliGRAM(s) Oral daily  simvastatin 40 milliGRAM(s) Oral at bedtime  sodium chloride 0.9%. 1000 milliLiter(s) (75 mL/Hr) IV Continuous <Continuous>    MEDICATIONS  (PRN):  acetaminophen     Tablet .. 650 milliGRAM(s) Oral every 6 hours PRN Temp greater or equal to 38.5C (101.3F), Mild Pain (1 - 3)  ALPRAZolam 0.25 milliGRAM(s) Oral every 8 hours PRN anxiety  artificial  tears Solution 1 Drop(s) Both EYES four times a day PRN Dry Eyes  artificial tears (preservative free) Ophthalmic Solution 1 Drop(s) Both EYES five times a day PRN Dry Eyes      Allergies    No Known Allergies    Intolerances        Vent settings (if applicable)        Physical Examination:    Pleasant  Neck: no JVD, LAD, accessory muscle use  PULM:  prolonged exp phase decreased breath sounds at the bases  CVS: Regular rate and rhythm, S1S2, no murmurs, rubs, or gallops  Abdomen: soft normoactive bs  Extremities: unremarkable  Neuro: grossly non-focal      LABS:                        10.0   68.17 )-----------( 159      ( 14 Aug 2022 07:08 )             32.9     08-14    138  |  106  |  13  ----------------------------<  94  4.4   |  23  |  0.58    Ca    9.2      14 Aug 2022 07:11            CAPILLARY BLOOD GLUCOSE      POCT Blood Glucose.: 107 mg/dL (13 Aug 2022 08:58)              CULTURES:  Culture Results:   Normal Respiratory Patty present (08-13 @ 16:18)  Culture Results:   No growth (08-11 @ 19:12)  Culture Results:   No growth to date. (08-11 @ 17:15)  Culture Results:   No growth to date. (08-11 @ 17:00)    Most recent blood culture -- 08-13 @ 16:18   -- -- .Sputum Sputum 08-13 @ 16:18  Most recent blood culture -- 08-11 @ 19:12   -- -- Clean Catch Clean Catch (Midstream) 08-11 @ 19:12  Most recent blood culture -- 08-11 @ 17:15   -- -- .Blood Blood-Peripheral 08-11 @ 17:15  Most recent blood culture -- 08-11 @ 17:00   -- -- .Blood Blood-Peripheral 08-11 @ 17:00      RADIOLOGY REVIEWED    CXR:      CT chest:      Other:

## 2022-08-15 ENCOUNTER — TRANSCRIPTION ENCOUNTER (OUTPATIENT)
Age: 72
End: 2022-08-15

## 2022-08-15 VITALS
HEART RATE: 75 BPM | RESPIRATION RATE: 18 BRPM | OXYGEN SATURATION: 97 % | DIASTOLIC BLOOD PRESSURE: 69 MMHG | SYSTOLIC BLOOD PRESSURE: 135 MMHG

## 2022-08-15 LAB
ANION GAP SERPL CALC-SCNC: 9 MMOL/L — SIGNIFICANT CHANGE UP (ref 5–17)
BUN SERPL-MCNC: 12 MG/DL — SIGNIFICANT CHANGE UP (ref 7–23)
CALCIUM SERPL-MCNC: 9.5 MG/DL — SIGNIFICANT CHANGE UP (ref 8.4–10.5)
CHLORIDE SERPL-SCNC: 108 MMOL/L — SIGNIFICANT CHANGE UP (ref 96–108)
CO2 SERPL-SCNC: 23 MMOL/L — SIGNIFICANT CHANGE UP (ref 22–31)
CREAT SERPL-MCNC: 0.51 MG/DL — SIGNIFICANT CHANGE UP (ref 0.5–1.3)
CULTURE RESULTS: SIGNIFICANT CHANGE UP
EGFR: 100 ML/MIN/1.73M2 — SIGNIFICANT CHANGE UP
GLUCOSE SERPL-MCNC: 97 MG/DL — SIGNIFICANT CHANGE UP (ref 70–99)
HCT VFR BLD CALC: 34.3 % — LOW (ref 34.5–45)
HGB BLD-MCNC: 10.7 G/DL — LOW (ref 11.5–15.5)
MAGNESIUM SERPL-MCNC: 2.2 MG/DL — SIGNIFICANT CHANGE UP (ref 1.6–2.6)
MCHC RBC-ENTMCNC: 28.7 PG — SIGNIFICANT CHANGE UP (ref 27–34)
MCHC RBC-ENTMCNC: 31.2 GM/DL — LOW (ref 32–36)
MCV RBC AUTO: 92 FL — SIGNIFICANT CHANGE UP (ref 80–100)
NRBC # BLD: 0 /100 WBCS — SIGNIFICANT CHANGE UP (ref 0–0)
PHOSPHATE SERPL-MCNC: 2.7 MG/DL — SIGNIFICANT CHANGE UP (ref 2.5–4.5)
PLATELET # BLD AUTO: 175 K/UL — SIGNIFICANT CHANGE UP (ref 150–400)
POTASSIUM SERPL-MCNC: 4.3 MMOL/L — SIGNIFICANT CHANGE UP (ref 3.5–5.3)
POTASSIUM SERPL-SCNC: 4.3 MMOL/L — SIGNIFICANT CHANGE UP (ref 3.5–5.3)
RBC # BLD: 3.73 M/UL — LOW (ref 3.8–5.2)
RBC # FLD: 15 % — HIGH (ref 10.3–14.5)
SODIUM SERPL-SCNC: 140 MMOL/L — SIGNIFICANT CHANGE UP (ref 135–145)
SPECIMEN SOURCE: SIGNIFICANT CHANGE UP
WBC # BLD: 77.48 K/UL — CRITICAL HIGH (ref 3.8–10.5)
WBC # FLD AUTO: 77.48 K/UL — CRITICAL HIGH (ref 3.8–10.5)

## 2022-08-15 PROCEDURE — 82435 ASSAY OF BLOOD CHLORIDE: CPT

## 2022-08-15 PROCEDURE — 80053 COMPREHEN METABOLIC PANEL: CPT

## 2022-08-15 PROCEDURE — 84100 ASSAY OF PHOSPHORUS: CPT

## 2022-08-15 PROCEDURE — 81001 URINALYSIS AUTO W/SCOPE: CPT

## 2022-08-15 PROCEDURE — 97161 PT EVAL LOW COMPLEX 20 MIN: CPT

## 2022-08-15 PROCEDURE — 82330 ASSAY OF CALCIUM: CPT

## 2022-08-15 PROCEDURE — 85025 COMPLETE CBC W/AUTO DIFF WBC: CPT

## 2022-08-15 PROCEDURE — 87449 NOS EACH ORGANISM AG IA: CPT

## 2022-08-15 PROCEDURE — 85014 HEMATOCRIT: CPT

## 2022-08-15 PROCEDURE — 84295 ASSAY OF SERUM SODIUM: CPT

## 2022-08-15 PROCEDURE — 85018 HEMOGLOBIN: CPT

## 2022-08-15 PROCEDURE — 96361 HYDRATE IV INFUSION ADD-ON: CPT

## 2022-08-15 PROCEDURE — 99285 EMERGENCY DEPT VISIT HI MDM: CPT | Mod: 25

## 2022-08-15 PROCEDURE — 82947 ASSAY GLUCOSE BLOOD QUANT: CPT

## 2022-08-15 PROCEDURE — 71250 CT THORAX DX C-: CPT

## 2022-08-15 PROCEDURE — 96374 THER/PROPH/DIAG INJ IV PUSH: CPT

## 2022-08-15 PROCEDURE — 85730 THROMBOPLASTIN TIME PARTIAL: CPT

## 2022-08-15 PROCEDURE — 87070 CULTURE OTHR SPECIMN AEROBIC: CPT

## 2022-08-15 PROCEDURE — 36415 COLL VENOUS BLD VENIPUNCTURE: CPT

## 2022-08-15 PROCEDURE — 82962 GLUCOSE BLOOD TEST: CPT

## 2022-08-15 PROCEDURE — 85027 COMPLETE CBC AUTOMATED: CPT

## 2022-08-15 PROCEDURE — 87040 BLOOD CULTURE FOR BACTERIA: CPT

## 2022-08-15 PROCEDURE — 85610 PROTHROMBIN TIME: CPT

## 2022-08-15 PROCEDURE — 82803 BLOOD GASES ANY COMBINATION: CPT

## 2022-08-15 PROCEDURE — 84132 ASSAY OF SERUM POTASSIUM: CPT

## 2022-08-15 PROCEDURE — 80048 BASIC METABOLIC PNL TOTAL CA: CPT

## 2022-08-15 PROCEDURE — 0225U NFCT DS DNA&RNA 21 SARSCOV2: CPT

## 2022-08-15 PROCEDURE — 84484 ASSAY OF TROPONIN QUANT: CPT

## 2022-08-15 PROCEDURE — 94640 AIRWAY INHALATION TREATMENT: CPT

## 2022-08-15 PROCEDURE — 87086 URINE CULTURE/COLONY COUNT: CPT

## 2022-08-15 PROCEDURE — 71045 X-RAY EXAM CHEST 1 VIEW: CPT

## 2022-08-15 PROCEDURE — 93005 ELECTROCARDIOGRAM TRACING: CPT

## 2022-08-15 PROCEDURE — 83735 ASSAY OF MAGNESIUM: CPT

## 2022-08-15 PROCEDURE — 96375 TX/PRO/DX INJ NEW DRUG ADDON: CPT

## 2022-08-15 PROCEDURE — 83605 ASSAY OF LACTIC ACID: CPT

## 2022-08-15 PROCEDURE — 82784 ASSAY IGA/IGD/IGG/IGM EACH: CPT

## 2022-08-15 RX ORDER — GUAIFENESIN/DEXTROMETHORPHAN 600MG-30MG
10 TABLET, EXTENDED RELEASE 12 HR ORAL
Refills: 0 | Status: DISCONTINUED | OUTPATIENT
Start: 2022-08-15 | End: 2022-08-15

## 2022-08-15 RX ORDER — GUAIFENESIN/DEXTROMETHORPHAN 600MG-30MG
10 TABLET, EXTENDED RELEASE 12 HR ORAL
Qty: 0 | Refills: 0 | DISCHARGE
Start: 2022-08-15

## 2022-08-15 RX ORDER — CEFUROXIME AXETIL 250 MG
1 TABLET ORAL
Qty: 10 | Refills: 0
Start: 2022-08-15 | End: 2022-08-19

## 2022-08-15 RX ORDER — ACETAMINOPHEN 500 MG
2 TABLET ORAL
Qty: 0 | Refills: 0 | DISCHARGE
Start: 2022-08-15

## 2022-08-15 RX ORDER — FAMOTIDINE 10 MG/ML
20 INJECTION INTRAVENOUS ONCE
Refills: 0 | Status: COMPLETED | OUTPATIENT
Start: 2022-08-15 | End: 2022-08-15

## 2022-08-15 RX ORDER — CEFUROXIME AXETIL 250 MG
1 TABLET ORAL
Qty: 14 | Refills: 0
Start: 2022-08-15 | End: 2022-08-21

## 2022-08-15 RX ADMIN — CARVEDILOL PHOSPHATE 3.12 MILLIGRAM(S): 80 CAPSULE, EXTENDED RELEASE ORAL at 05:20

## 2022-08-15 RX ADMIN — FAMOTIDINE 20 MILLIGRAM(S): 10 INJECTION INTRAVENOUS at 01:21

## 2022-08-15 RX ADMIN — Medication 100 MILLIGRAM(S): at 05:21

## 2022-08-15 RX ADMIN — LISINOPRIL 20 MILLIGRAM(S): 2.5 TABLET ORAL at 05:21

## 2022-08-15 RX ADMIN — ENOXAPARIN SODIUM 40 MILLIGRAM(S): 100 INJECTION SUBCUTANEOUS at 10:04

## 2022-08-15 RX ADMIN — Medication 10 MILLILITER(S): at 12:50

## 2022-08-15 RX ADMIN — Medication 200 MILLIGRAM(S): at 14:49

## 2022-08-15 RX ADMIN — Medication 1200 MILLIGRAM(S): at 05:21

## 2022-08-15 NOTE — PROGRESS NOTE ADULT - SUBJECTIVE AND OBJECTIVE BOX
[facility] - [facility] - PROGRESS NOTE    CHIEF COMPLAINT: pneumonia; cough; CLL (immunocompromised host    INTERVAL HISTORY: looks and feels well; quite anxious about her underlying CLL now with infection; no shortness of breath or hypoxemia on room air walking to the bathroom; occasional cough productive of scant sputum; no chest congestion or wheeze; no fevers, chills or sweats; no chest pain/pressure or palpitations;      REVIEW OF SYSTEMS:  Constitutional: As per interval history  HEENT: Within normal limits  CV: As per interval history  Resp: As per interval history  GI: Within normal limits   : Within normal limits  Musculoskeletal: Within normal limits  Skin: Within normal limits  Neurological: Within normal limits  Psychiatric: Within normal limits  Endocrine: Within normal limits  Hematologic/Lymphatic: CLL  Allergic/Immunologic: immunocompromised host    MEDICATIONS:     Pulmonary "  guaiFENesin ER 1200 milliGRAM(s) Oral every 12 hours    Anti-microbials:  cefTRIAXone   IVPB 2000 milliGRAM(s) IV Intermittent every 24 hours  doxycycline monohydrate Capsule 100 milliGRAM(s) Oral every 12 hours    Cardiovascular:  carvedilol 3.125 milliGRAM(s) Oral every 12 hours  lisinopril 20 milliGRAM(s) Oral daily    Other:  dextrose 5% + sodium chloride 0.9%. 1000 milliLiter(s) IV Continuous <Continuous>  enoxaparin Injectable 40 milliGRAM(s) SubCutaneous every 24 hours  simvastatin 40 milliGRAM(s) Oral at bedtime  sodium chloride 0.9%. 1000 milliLiter(s) IV Continuous <Continuous>    MEDICATIONS  (PRN):  acetaminophen     Tablet .. 650 milliGRAM(s) Oral every 6 hours PRN Temp greater or equal to 38.5C (101.3F), Mild Pain (1 - 3)  ALPRAZolam 0.25 milliGRAM(s) Oral every 8 hours PRN anxiety  artificial  tears Solution 1 Drop(s) Both EYES four times a day PRN Dry Eyes  artificial tears (preservative free) Ophthalmic Solution 1 Drop(s) Both EYES five times a day PRN Dry Eyes      OBJECTIVE:    PHYSICAL EXAM:       ICU Vital Signs Last 24 Hrs  T(C): 36.7 (15 Aug 2022 08:20), Max: 37.3 (14 Aug 2022 16:07)  T(F): 98.1 (15 Aug 2022 08:20), Max: 99.1 (14 Aug 2022 16:07)  HR: 63 (15 Aug 2022 08:20) (57 - 95)  BP: 156/77 (15 Aug 2022 08:20) (126/65 - 156/77)  BP(mean): --  ABP: --  ABP(mean): --  RR: 18 (15 Aug 2022 08:20) (18 - 18)  SpO2: 99% (15 Aug 2022 08:20) (95% - 99%) on room air    General: Awake. Alert. Cooperative. No distress. Appears stated age.	  HEENT: Atraumatic. Normocephalic. Anicteric. Normal oral mucosa. PERRL. EOMI.  Neck: Supple. Trachea midline. Thyroid without enlargement/tenderness/nodules. No carotid bruit. No JVD.	  Cardiovascular: Regular rate and rhythm. S1 S2 normal. No murmurs, rubs or gallops.  Respiratory: Respirations unlabored. Clear to auscultation and percussion bilaterally. No curvature.  Abdomen: Soft. Non-tender. Non-distended. No organomegaly. No masses. Normal bowel sounds.  Extremities: Warm to touch. No clubbing or cyanosis. No pedal edema.  Pulses: 2+ peripheral pulses all extremities.	  Skin: Normal skin color. No rashes or lesions. No ecchymoses. No cyanosis. Warm to touch.  Lymph Nodes: Cervical, supraclavicular and axillary nodes normal  Neurological: Motor and sensory examination equal and normal. A and O x 3  Psychiatry: Appropriate mood and affect.    LABS:                          10.0   68.17 )-----------( 159      ( 14 Aug 2022 07:08 )             32.9     CBC    WBC  68.17 <==, 74.59 <==, 90.51 <==, 113.28 <==    Hemoglobin  10.0 <<==, 10.4 <<==, 10.4 <<==, 11.5 <<==    Hematocrit  32.9 <==, 33.7 <==, 33.1 <==, 37.0 <==    Platelets  159 <==, 154 <==, 156 <==, 174 <==      138  |  106  |  13  ----------------------------<  94    08-14  4.4   |  23  |  0.58      LYTES    sodium  138 <==, 138 <==, 137 <==, 139 <==    potassium   4.4 <==, 4.3 <==, 3.0 <==, 3.6 <==    chloride  106 <==, 104 <==, 102 <==, 100 <==    carbon dioxide  23 <==, 24 <==, 23 <==, 25 <==    =============================================================================================  RENAL FUNCTION:    Creatinine:   0.58  <<==, 0.58  <<==, 0.60  <<==, 0.65  <<==    BUN:   13 <==, 18 <==, 15 <==, 12 <==    ============================================================================================    calcium   9.2 <==, 9.0 <==, 8.9 <==, 9.9 <==    ============================================================================================  LFTs    AST:   19 <==     ALT:  15  <==     AP:  84  <=    Bili:  0.6  <=    PT/INR - ( 11 Aug 2022 17:18 )   PT: 12.2 sec;   INR: 1.05 ratio      CARDIAC MARKERS ( 12 Aug 2022 00:26 )  CPK x     /CKMB x     /CKMB Units x        troponin 7 ng/L      MICROBIOLOGY:     Culture - Sputum . (08.13.22 @ 16:18)   Culture Results:   Normal Respiratory Patty present     Culture - Urine (08.11.22 @ 19:12)   Specimen Source: Clean Catch Clean Catch (Midstream)   Culture Results:   No growth     Culture - Blood (08.11.22 @ 17:15)   Specimen Source: .Blood Blood-Peripheral   Culture Results:   No growth to date.     Culture - Blood (08.11.22 @ 17:00)   Specimen Source: .Blood Blood-Peripheral   Culture Results:   No growth to date.     Legionella pneumophila Antigen, Urine (08.13.22 @ 09:37)   Legionella Antigen, Urine: Negative    Respiratory Viral Panel with COVID-19 by HAYLEE (08.11.22 @ 17:37)   Rapid RVP Result: [facility]   SARS-CoV-2: [facility]  This Respiratory Panel uses polymerase chain reaction (PCR) to detect for   adenovirus; coronavirus (HKU1, NL63, 229E, OC43); human metapneumovirus   (hMPV); human enterovirus/rhinovirus (Entero/RV); influenza A; influenza   A/H1; influenza A/H3; influenza A/H1-2009; influenza B; parainfluenza   viruses 1, 2, 3, 4; respiratory syncytial virus; Mycoplasma pneumoniae;   Chlamydophila pneumoniae; and SARS-CoV-2.       RADIOLOGY:  [x] Chest radiographs reviewed and interpreted by me    EXAM:  XR CHEST PORTABLE URGENT 1V                          PROCEDURE DATE:  08/11/2022      FINDINGS:      The heart is normal in size.  Hazy right upper lobe opacity.  There is no pneumothorax.  No acute bony abnormality.    IMPRESSION:  Hazy right upper lobe opacity, concerning for pneumonia. Clinical   correlation recommended.    BRENDA DOW MD; Resident Radiologist  This document has been electronically signed.  KATE WILKINS MD; Attending Radiologist  This document has been electronically signed. Aug 12 2022  9:10AM  ---------------------------------------------------------------------------------------------------------------  EXAM:  CT CHEST                          PROCEDURE DATE:  08/11/2022      FINDINGS:    LYMPH NODES: A few enlarged mediastinal lymph nodes with the largest   being a lower paratracheal node measuring up to 1.9 cm (2, 44).    HEART/VASCULATURE: Heart size normal. No pericardial effusion. Scattered   coronary artery calcifications. Aortic calcifications.    AIRWAYS/LUNGS/PLEURA: The central airways are patent. There is a mild   consolidative and groundglass opacity in the right upper lobe. No pleural   effusions.    UPPER ABDOMEN: There is diffuse hypoattenuation of the hepatic   parenchyma, consistent with hepatic steatosis. Small hiatal hernia.   Cholelithiasis. There are two peripherally calcified probable splenic   artery aneurysms, with the largest measuring 7 mm.    BONES/SOFT TISSUES: Degenerative changes.    IMPRESSION:    Mild consolidative and groundglass opacity in the right upper lobe,   likely representing pneumonia. Follow-up noncontrast chest CT can be   obtained in 4-6 weeks to assess for resolution if clinically indicated.    A few enlarged mediastinal lymph nodes measuring up to 1.9 cm, likely   reactive.     ELSY GONZALEZ MD; Resident Radiologist  This document has been electronically signed.   BRAD GRIFFIN DO; Attending Radiologist  This document has been electronically signed. Aug 12 2022 10:06AM  ---------------------------------------------------------------------------------------------------------------

## 2022-08-15 NOTE — PHYSICAL THERAPY INITIAL EVALUATION ADULT - PERTINENT HX OF CURRENT PROBLEM, REHAB EVAL
73 y/o female w/ PMH HTN and HLD c/o 3 day history of worsening fever and urinary frequency and 1 day history of chest pain and SOB that has resolved (no longer admitting to chest pain and SOB). Admits to nausea, dysuria. Denies vomiting, dizziness, abdominal pain, hematuria. CT Chest (8/11): Mild consolidative and groundglass opacity in the right upper lobe, likely representing pneumonia. A few enlarged mediastinal lymph nodes measuring up to 1.9 cm, likely reactive. Chest XRay (8/11): Hazy right upper lobe opacity, concerning for pneumonia.

## 2022-08-15 NOTE — PHYSICAL THERAPY INITIAL EVALUATION ADULT - GAIT PATTERN USED, PT EVAL
2-point gait What Type Of Note Output Would You Prefer (Optional)?: Standard Output Is This A New Presentation, Or A Follow-Up?: Skin Lesion

## 2022-08-15 NOTE — DISCHARGE NOTE NURSING/CASE MANAGEMENT/SOCIAL WORK - PATIENT PORTAL LINK FT
You can access the FollowMyHealth Patient Portal offered by [facility] by registering at the following website: [email or web address]. By joining [facility]’s FollowMyHealth portal, you will also be able to view your health information using other applications (apps) compatible with our system.

## 2022-08-15 NOTE — PHYSICAL THERAPY INITIAL EVALUATION ADULT - ADDITIONAL COMMENTS
Per pt, she lives in a house with cousin. Has 7 steps to enter (+HR) and "steps everywhere" inside. Reports being independent without AD. -drive

## 2022-08-15 NOTE — DISCHARGE NOTE NURSING/CASE MANAGEMENT/SOCIAL WORK - NSDCPEFALRISK_GEN_ALL_CORE
For information on Fall & Injury Prevention, visit: [email or web address] OR  [email or web address] OR  https://www.cdc.gov/steadi/patient.html

## 2022-08-15 NOTE — CHART NOTE - NSCHARTNOTEFT_GEN_A_CORE
Medically cleared by ID for discharge. No fever x 24 hours. Ceftin 250 BID and doxy for 5 more days per Dr. Kelsey. Follow up with PMD and hemonc. Discharge home

## 2022-08-15 NOTE — DISCHARGE NOTE PROVIDER - CARE PROVIDERS DIRECT ADDRESSES
[email or web address]

## 2022-08-15 NOTE — DISCHARGE NOTE PROVIDER - HOSPITAL COURSE
71 y/o female w/ PMH HTN and HLD, CLL c/o 3 day history of worsening fever and urinary frequency and 1 day history of chest pain and SOB that has resolved (no longer admitting to chest pain and SOB). Admitted for nausea, dysuria      Assessment:steve with CLL not on tx admitted with fever for a few days, mil cough, found to have pneumonia, clinically improved on ctx and doxycycline. legionella neg cx neg , rvp neg, sp  with usual respiratory alok so far. She is doing well  Plan:  continue ceftriaxone and doxy  f/u final cultures  once afebrile full 24 hour po antibiotic, hope for tomorrow, ceftin and doxycycline for another week   igg level is a bit over 400, would like her to get iv ig infusions, heme follows  heme f/u for cll after d/c     71 y/o female w/ PMH HTN and HLD, CLL c/o 3 day history of worsening fever and urinary frequency and 1 day history of chest pain and SOB that has resolved (no longer admitting to chest pain and SOB). Admitted for nausea, dysuria      Assessment:steve with CLL not on tx admitted with fever for a few days, mil cough, found to have pneumonia, clinically improved on ctx and doxycycline. legionella neg cx neg , rvp neg, sp  with usual respiratory alok so far. She is doing well  Plan:  continue ceftriaxone and doxy  f/u final cultures  once afebrile full 24 hour po antibiotic, hope for tomorrow, ceftin and doxycycline for another week   igg level is a bit over 400, would like her to get iv ig infusions, heme follows  Discharged home on ceftin 250 bid anddoxy for 5 more days.

## 2022-08-15 NOTE — PROGRESS NOTE ADULT - ASSESSMENT
ASSESSMENT:      immunocompromised host due to underlying CLL being followed conservatively admitted with right upper lobe pneumonia - she has responded well to ceftriaxone and doxycyline - leukocytosis has moderated - fevers have resolved - her appetite is good - her respiratory status is stable on room air save for a mild cough    PLAN/RECOMMENDATIONS:    stable oxygenation on room air  on ceftriaxone and doxycyline - no objection of transitioning to ceftin and doxycycline to complete a 7 day course of antibiotics in the outpatient setting  tessalon/robitussin DM  head of bed elevation - incentive spirometry  follow-up with her hematologist in the outpatient setting - ID recommends IVIG  eventual follow-up radiograph in ~ 6 - 12 weeks to document radiographic resolution  usual GI/DVT prophyalxis as indicated  judicious fluid and electrolyte management    Will follow with you. Plan of care discussed with the patient at bedside and with Dr. Johann Ya MD, [facility]  [phone]  Pulmonary Medicine

## 2022-08-15 NOTE — PROVIDER CONTACT NOTE (OTHER) - SITUATION
pt with no food orders, metformin ordered as well as sliding scale
Patient has fever 101.3 during IVIG infusion

## 2022-08-15 NOTE — DISCHARGE NOTE PROVIDER - NSDCFUSCHEDAPPT_GEN_ALL_CORE_FT
[facility]  [facility] [address]  Scheduled Appointment: 08/16/2022

## 2022-08-15 NOTE — PROGRESS NOTE ADULT - ASSESSMENT
72 f with    Sepsis/ Pneumonia  - antibiotic Change to Ceftin and Doxy for 7 days  - ID follow   - Pulmonary evaluation noted. Cleared for DC.     CLL  - Hem Onc eval noted    HTN control    Hypercholesterolemia   - stable    Anxiety  - control    DVT prophylaxis    Clayton Wills MD phone [phone]

## 2022-08-15 NOTE — DISCHARGE NOTE PROVIDER - NSDCCPCAREPLAN_GEN_ALL_CORE_FT
PRINCIPAL DISCHARGE DIAGNOSIS  Diagnosis: Pneumonia  Assessment and Plan of Treatment: Pneumonia is a lung infection that can cause a fever, cough, and trouble breathing.  Continue all antibiotics as ordered until complete.  Nutrition is important, eat small frequent meals.  Get lots of rest and drink fluids.  Call your health care provider upon arrival home from hospital and make a follow up appointment for one week.  If your cough worsens, you develop fever greater than 101', you have shaking chills, a fast heartbeat, trouble breathing and/or feel your are breathing much faster than usual, call your healthcare provider.  Make sure you wash your hands frequently.  *Continue Ceftin for one more week  Eventual follow up chest CT (6 to 12 weeks after completing treatment as long as she remains stable otherwise.        SECONDARY DISCHARGE DIAGNOSES  Diagnosis: CLL (chronic lymphocytic leukemia)  Assessment and Plan of Treatment: Follow up with Hemeoncology for managment     PRINCIPAL DISCHARGE DIAGNOSIS  Diagnosis: Pneumonia  Assessment and Plan of Treatment: Pneumonia is a lung infection that can cause a fever, cough, and trouble breathing.  Continue all antibiotics as ordered until complete.  Nutrition is important, eat small frequent meals.  Get lots of rest and drink fluids.  Call your health care provider upon arrival home from hospital and make a follow up appointment for one week.  If your cough worsens, you develop fever greater than 101', you have shaking chills, a fast heartbeat, trouble breathing and/or feel your are breathing much faster than usual, call your healthcare provider.  Make sure you wash your hands frequently.  *Continue Ceftin and doxycyline for 5 more days   Eventual follow up chest CT (6 to 12 weeks after completing treatment as long as she remains stable otherwise.        SECONDARY DISCHARGE DIAGNOSES  Diagnosis: CLL (chronic lymphocytic leukemia)  Assessment and Plan of Treatment: Follow up with Hemeoncology for managment

## 2022-08-15 NOTE — DISCHARGE NOTE PROVIDER - CARE PROVIDER_API CALL
Sherif Sanchez)  Internal Medicine; Pulmonary Disease  [address], [address]  [place] [address]  Phone: [phone]  Fax: [phone]  Follow Up Time:     [facility])  Internal Medicine; Medical Oncology  [address], [address] [place] [address]  Phone: [phone]  Fax: [phone]  Follow Up Time:

## 2022-08-15 NOTE — PROGRESS NOTE ADULT - SUBJECTIVE AND OBJECTIVE BOX
Patient is a 72y old  Female who presents with a chief complaint of Fever (14 Aug 2022 18:26)      SUBJECTIVE / OVERNIGHT EVENTS: Comfortable without new complaints.   Review of Systems  chest pain no  palpitations no  sob improving   nausea no  headache no    MEDICATIONS  (STANDING):  benzonatate 200 milliGRAM(s) Oral three times a day  carvedilol 3.125 milliGRAM(s) Oral every 12 hours  cefTRIAXone   IVPB 2000 milliGRAM(s) IV Intermittent every 24 hours  dextrose 5% + sodium chloride 0.9%. 1000 milliLiter(s) (75 mL/Hr) IV Continuous <Continuous>  doxycycline monohydrate Capsule 100 milliGRAM(s) Oral every 12 hours  enoxaparin Injectable 40 milliGRAM(s) SubCutaneous every 24 hours  guaifenesin/dextromethorphan Oral Liquid 10 milliLiter(s) Oral four times a day  lisinopril 20 milliGRAM(s) Oral daily  simvastatin 40 milliGRAM(s) Oral at bedtime  sodium chloride 0.9%. 1000 milliLiter(s) (75 mL/Hr) IV Continuous <Continuous>    MEDICATIONS  (PRN):  acetaminophen     Tablet .. 650 milliGRAM(s) Oral every 6 hours PRN Temp greater or equal to 38.5C (101.3F), Mild Pain (1 - 3)  ALPRAZolam 0.25 milliGRAM(s) Oral every 8 hours PRN anxiety  artificial  tears Solution 1 Drop(s) Both EYES four times a day PRN Dry Eyes  artificial tears (preservative free) Ophthalmic Solution 1 Drop(s) Both EYES five times a day PRN Dry Eyes      Vital Signs Last 24 Hrs  T(C): 36.8 (15 Aug 2022 12:37), Max: 36.9 (14 Aug 2022 20:09)  T(F): 98.2 (15 Aug 2022 12:37), Max: 98.4 (14 Aug 2022 20:09)  HR: 75 (15 Aug 2022 13:28) (57 - 75)  BP: 135/69 (15 Aug 2022 13:28) (128/74 - 156/77)  BP(mean): --  RR: 18 (15 Aug 2022 13:28) (18 - 18)  SpO2: 97% (15 Aug 2022 13:28) (97% - 99%)    Parameters below as of 15 Aug 2022 13:28  Patient On (Oxygen Delivery Method): room air        PHYSICAL EXAM:  GENERAL: NAD, well-developed  HEAD:  Atraumatic, Normocephalic  EYES: EOMI, PERRLA, conjunctiva and sclera clear  NECK: Supple, No JVD  CHEST/LUNG: Clear to auscultation bilaterally; No wheeze  HEART: Regular rate and rhythm; No murmurs, rubs, or gallops  ABDOMEN: Soft, Nontender, Nondistended; Bowel sounds present  EXTREMITIES:  2+ Peripheral Pulses, No clubbing, cyanosis, or edema  PSYCH: AAOx3, anxious  NEUROLOGY: non-focal  SKIN: No rashes or lesions    LABS:                        10.7   77.48 )-----------( 175      ( 15 Aug 2022 08:33 )             34.3     08-15    140  |  108  |  12  ----------------------------<  97  4.3   |  23  |  0.51    Ca    9.5      15 Aug 2022 08:32  Phos  2.7     08-15  Mg     2.2     08-15                Culture - Sputum (collected 13 Aug 2022 16:18)  Source: .Sputum Sputum  Gram Stain (13 Aug 2022 22:41):    Few Squamous epithelial cells per low power field    Few polymorphonuclear leukocytes per low power field    Few Gram positive cocci in pairs per oil power field    Few Gram Negative Rods per oil power field  Preliminary Report (14 Aug 2022 14:49):    Normal Respiratory Patty present        RADIOLOGY & ADDITIONAL TESTS:    Imaging Personally Reviewed:    Consultant(s) Notes Reviewed:      Care Discussed with Consultants/Other Providers:

## 2022-08-15 NOTE — PROGRESS NOTE ADULT - PROVIDER SPECIALTY LIST ADULT
Infectious Disease
Infectious Disease
Internal Medicine
Pulmonology
Pulmonology
Internal Medicine

## 2022-08-15 NOTE — DISCHARGE NOTE PROVIDER - NSDCMRMEDTOKEN_GEN_ALL_CORE_FT
Artificial Tears ophthalmic solution: 1 drop(s) to each affected eye 2 times a day, As Needed  Coreg 3.125 mg oral tablet: 1 tab(s) orally 2 times a day  guaiFENesin 1200 mg oral tablet, extended release: 1 tab(s) orally every 12 hours  lisinopril-hydrochlorothiazide 20 mg-25 mg oral tablet: 1 tab(s) orally once a day  multivitamin: 1 tab(s) orally once a day  simvastatin 40 mg oral tablet: 1 tab(s) orally once a day (at bedtime)  Vitamin C 500 mg oral tablet: 1 tab(s) orally once a day   acetaminophen 325 mg oral tablet: 2 tab(s) orally every 6 hours, As needed, Temp greater or equal to 38.5C (101.3F), Mild Pain (1 - 3)  Artificial Tears ophthalmic solution: 1 drop(s) to each affected eye 2 times a day, As Needed  benzonatate 100 mg oral capsule: 2 cap(s) orally 3 times a day  cefuroxime 250 mg oral tablet: 1 tab(s) orally every 12 hours   Coreg 3.125 mg oral tablet: 1 tab(s) orally 2 times a day  doxycycline monohydrate 50 mg oral capsule: 2 cap(s) orally every 12 hours  guaiFENesin 1200 mg oral tablet, extended release: 1 tab(s) orally every 12 hours  guaifenesin-dextromethorphan 100 mg-10 mg/5 mL oral liquid: 10 milliliter(s) orally 4 times a day  lisinopril-hydrochlorothiazide 20 mg-25 mg oral tablet: 1 tab(s) orally once a day  multivitamin: 1 tab(s) orally once a day  simvastatin 40 mg oral tablet: 1 tab(s) orally once a day (at bedtime)  Vitamin C 500 mg oral tablet: 1 tab(s) orally once a day

## 2022-08-16 LAB
CULTURE RESULTS: SIGNIFICANT CHANGE UP
CULTURE RESULTS: SIGNIFICANT CHANGE UP
SPECIMEN SOURCE: SIGNIFICANT CHANGE UP
SPECIMEN SOURCE: SIGNIFICANT CHANGE UP

## 2022-08-17 PROBLEM — C91.10 CHRONIC LYMPHOCYTIC LEUKEMIA OF B-CELL TYPE NOT HAVING ACHIEVED REMISSION: Chronic | Status: ACTIVE | Noted: 2022-08-11

## 2022-08-30 ENCOUNTER — APPOINTMENT (OUTPATIENT)
Dept: HEMATOLOGY ONCOLOGY | Facility: CLINIC | Age: 72
End: 2022-08-30

## 2022-08-30 VITALS
SYSTOLIC BLOOD PRESSURE: 140 MMHG | HEART RATE: 86 BPM | OXYGEN SATURATION: 99 % | BODY MASS INDEX: 25.58 KG/M2 | TEMPERATURE: 97.5 F | DIASTOLIC BLOOD PRESSURE: 75 MMHG | WEIGHT: 148 LBS

## 2022-08-30 PROCEDURE — 85025 COMPLETE CBC W/AUTO DIFF WBC: CPT | Mod: GC

## 2022-08-30 PROCEDURE — 99213 OFFICE O/P EST LOW 20 MIN: CPT | Mod: GC,25

## 2022-08-30 NOTE — ASSESSMENT
[FreeTextEntry1] : This is a 71 year old female with Stage I CLL, del 13q, continues to have stable disease and remains asymptomatic. \par #CLL\par - CBC reviewed with patient- stable, she is without B sx - no need for treatment currently\par -needs to follow up with PCP for better continuity of care for her elevated BP, likely due to stress and anxiety\par - we have sent referral for psychiatry/psychologist-patient currently considering it\par - she is due for routine HCM including yearly physical, colonoscopy, derm eval, mammogram, has deferred previously, encourage \par - f/u in 3 months, sooner PRN\par \par

## 2022-08-30 NOTE — RESULTS/DATA
[FreeTextEntry1] : cbc done 8/30/22\par wbc-113.0\par hgb- 13.3\par hct - 40.9\par plt- 139.0\par alc - 89.63\par anc - 4.86

## 2022-08-30 NOTE — HISTORY OF PRESENT ILLNESS
[de-identified] : 70 yo female with hx of CLL- Stage I diagnosed 1/2012, del 13q. Treatment naive [de-identified] : 5/10/22: Patient without any new complaints.  She continues to have anxiety about many things, especially her medical condition.  She has no night sweats, no weight loss, no fever/chills. She has no cough, no SOB, no abdominal c/o. Pt had COVID 4/2021. She is fully vaccinated since 5/2021. Pt states she is having trouble sleeping and anxiety due to her diagnosis. She has yet to follow up with her PCP and dermatology. She is still thinking about psychotherapy.\par \par 8/30/22: Since last visit, patient had been admitted to [facility] 8/11-8/15 with right upper lobe pneumonia - she was given ceftriaxone and doxycycline, with improvement in symptoms and was discharged.

## 2022-08-31 LAB
ALBUMIN SERPL ELPH-MCNC: 4.6 G/DL
ALP BLD-CCNC: 82 U/L
ALT SERPL-CCNC: 20 U/L
ANION GAP SERPL CALC-SCNC: 10 MMOL/L
AST SERPL-CCNC: 21 U/L
BILIRUB SERPL-MCNC: 0.2 MG/DL
BUN SERPL-MCNC: 16 MG/DL
CALCIUM SERPL-MCNC: 10.3 MG/DL
CHLORIDE SERPL-SCNC: 104 MMOL/L
CO2 SERPL-SCNC: 28 MMOL/L
CREAT SERPL-MCNC: 0.66 MG/DL
EGFR: 94 ML/MIN/1.73M2
GLUCOSE SERPL-MCNC: 105 MG/DL
LDH SERPL-CCNC: 155 U/L
MAGNESIUM SERPL-MCNC: 2.2 MG/DL
PHOSPHATE SERPL-MCNC: 3.1 MG/DL
POTASSIUM SERPL-SCNC: 4 MMOL/L
PROT SERPL-MCNC: 6.8 G/DL
SODIUM SERPL-SCNC: 142 MMOL/L
URATE SERPL-MCNC: 3.9 MG/DL

## 2022-09-02 ENCOUNTER — APPOINTMENT (OUTPATIENT)
Dept: PULMONOLOGY | Facility: CLINIC | Age: 72
End: 2022-09-02

## 2022-09-02 VITALS
HEIGHT: 63 IN | OXYGEN SATURATION: 98 % | SYSTOLIC BLOOD PRESSURE: 125 MMHG | WEIGHT: 147 LBS | RESPIRATION RATE: 16 BRPM | BODY MASS INDEX: 26.05 KG/M2 | HEART RATE: 71 BPM | DIASTOLIC BLOOD PRESSURE: 80 MMHG | TEMPERATURE: 98 F

## 2022-09-02 PROCEDURE — 94010 BREATHING CAPACITY TEST: CPT

## 2022-09-02 PROCEDURE — ZZZZZ: CPT

## 2022-09-02 PROCEDURE — 99204 OFFICE O/P NEW MOD 45 MIN: CPT | Mod: 25

## 2022-09-02 PROCEDURE — 94727 GAS DIL/WSHOT DETER LNG VOL: CPT

## 2022-09-02 PROCEDURE — 94729 DIFFUSING CAPACITY: CPT

## 2022-09-11 NOTE — DISCUSSION/SUMMARY
[FreeTextEntry1] : 8/11/2022 CHest CT - Mild consolidative and groundglass opacity in the right upper lobe, likely representing pneumonia. Follow-up noncontrast chest CT can be obtained in 4-6 weeks to assess for resolution if clinically indicated. A few enlarged mediastinal lymph nodes measuring up to 1.9 cm, likely reactive.\par

## 2022-09-11 NOTE — ASSESSMENT
[FreeTextEntry1] : Ms. Jhaveri is 72 year old female with history of seasonal allergies, hyperlipidemia, anxiety and CLL stage 1 who now presents to office for initial pulmonary evaluation. \par \par Her chief concern is recent PNA. \par \par 1.PNA\par -resolved\par \par 2. Abnormal chest CT \par - repeat chest CT 6 weeks\par \par 3. Seasonal allergies\par -continue Zyrtec\par \par 4. GERD\par -diet controlled\par \par 5. CLL\par -follow up with Hem/Onc\par \par Patient to follow up in 6 weeks.\par Patient to call with further questions and concerns.\par Patient verbalizes understanding of care plan and is agreeable.\par

## 2022-09-11 NOTE — HISTORY OF PRESENT ILLNESS
[Never] : never [TextBox_4] : Ms. Jhaveri is 72 year old female with history of seasonal allergies, hyperlipidemia, anxiety and CLL stage 1 who now presents to office for initial pulmonary evaluation. \par \par Her chief concern is recent PNA. \par \par Patient reports she was admitted to hospitals with PNA from 8/11/22 - 8/15/2022. She states she was on IV antibiotics and finished oral antibiotics. Patient reports some residual fatigue and slight cough.\par Patient reports history of seasonal allergies and takes Zyrtec with benefit. She states she had GERD and is diet controlled. \par \par Patient reports she has CLL and is followed by Hem/Onco - currently on no treatment. \par \par Patient reports she is COVID vaccinated and boosted X 1. She reports she had COVID infection in 3/2020 and was hospitalized at [facility]. \par \par Patient denies fever, chills, SOB, wheezing, nasal congestion, runny nose or heartburn/reflux. \par \par

## 2022-09-11 NOTE — PROCEDURE
[FreeTextEntry1] : PFT'S performed in office show minimal obstructive lung defect. Lung volumes and diffusion capacity is within normal limits. \par FEV: 89\par FEV1/FVC Ratio: 75\par DBB39-31%: 72 \par DLCO: 91\par

## 2022-10-03 ENCOUNTER — APPOINTMENT (OUTPATIENT)
Dept: CT IMAGING | Facility: CLINIC | Age: 72
End: 2022-10-03

## 2022-10-04 ENCOUNTER — APPOINTMENT (OUTPATIENT)
Dept: CT IMAGING | Facility: CLINIC | Age: 72
End: 2022-10-04

## 2022-10-04 ENCOUNTER — OUTPATIENT (OUTPATIENT)
Dept: OUTPATIENT SERVICES | Facility: HOSPITAL | Age: 72
LOS: 1 days | End: 2022-10-04
Payer: MEDICARE

## 2022-10-04 DIAGNOSIS — R93.89 ABNORMAL FINDINGS ON DIAGNOSTIC IMAGING OF OTHER SPECIFIED BODY STRUCTURES: ICD-10-CM

## 2022-10-04 PROCEDURE — 71250 CT THORAX DX C-: CPT

## 2022-10-04 PROCEDURE — 71250 CT THORAX DX C-: CPT | Mod: 26

## 2022-10-13 ENCOUNTER — APPOINTMENT (OUTPATIENT)
Dept: PULMONOLOGY | Facility: CLINIC | Age: 72
End: 2022-10-13

## 2022-10-24 ENCOUNTER — APPOINTMENT (OUTPATIENT)
Dept: PULMONOLOGY | Facility: CLINIC | Age: 72
End: 2022-10-24

## 2022-10-24 VITALS
DIASTOLIC BLOOD PRESSURE: 72 MMHG | OXYGEN SATURATION: 98 % | HEIGHT: 65 IN | RESPIRATION RATE: 16 BRPM | SYSTOLIC BLOOD PRESSURE: 138 MMHG | WEIGHT: 146 LBS | HEART RATE: 67 BPM | BODY MASS INDEX: 24.32 KG/M2 | TEMPERATURE: 97.7 F

## 2022-10-24 PROCEDURE — 99214 OFFICE O/P EST MOD 30 MIN: CPT

## 2022-10-24 RX ORDER — OMEPRAZOLE 40 MG/1
40 CAPSULE, DELAYED RELEASE ORAL
Qty: 1 | Refills: 0 | Status: ACTIVE | COMMUNITY
Start: 2022-10-24 | End: 1900-01-01

## 2022-10-24 RX ORDER — FAMOTIDINE 40 MG/1
40 TABLET, FILM COATED ORAL
Qty: 30 | Refills: 0 | Status: ACTIVE | COMMUNITY
Start: 2022-10-24 | End: 1900-01-01

## 2022-10-24 NOTE — PHYSICAL EXAM
[No Acute Distress] : no acute distress [Normal Oropharynx] : normal oropharynx [I] : Mallampati Class: I [No Neck Mass] : no neck mass [Normal Rate/Rhythm] : normal rate/rhythm [Normal S1, S2] : normal s1, s2 [No Resp Distress] : no resp distress [Clear to Auscultation Bilaterally] : clear to auscultation bilaterally [No Abnormalities] : no abnormalities [Benign] : benign [Normal Color/ Pigmentation] : normal color/ pigmentation [No Focal Deficits] : no focal deficits [Normal Affect] : normal affect [Oriented x3] : oriented x3 [Well Nourished] : well nourished [Normal Appearance] : normal appearance [Well Groomed] : well groomed [No Deformities] : no deformities [Well Developed] : well developed [Supple] : supple [No JVD] : no jvd [No Acc Muscle Use] : no acc muscle use [Normal Palpation] : normal palpation [Normal Rhythm and Effort] : normal rhythm and effort [Normal Gait] : normal gait [No Clubbing] : no clubbing [No Cyanosis] : no cyanosis [No Edema] : no edema [FROM] : FROM [Remote Memory Normal] : remote memory normal [Normal Mood] : normal mood

## 2022-10-24 NOTE — REVIEW OF SYSTEMS
[Seasonal Allergies] : seasonal allergies [GERD] : gerd [Cough] : no cough [Hemoptysis] : no hemoptysis [Chest Tightness] : no chest tightness [Frequent URIs] : no frequent URIs [Sputum] : no sputum [Dyspnea] : no dyspnea [Pleuritic Pain] : no pleuritic pain [Wheezing] : no wheezing [A.M. Dry Mouth] : no a.m. dry mouth [SOB on Exertion] : no sob on exertion [Hay Fever] : no hay fever [Watery Eyes] : no watery eyes [Itchy Eyes] : no itchy eyes [Nasal Discharge] : no nasal discharge [Hives] : no hives [Angioedema] : no angioedema [Immunocompromised] : not immunocompromised [Abdominal Pain] : no abdominal pain [Nausea] : no nausea [Vomiting] : no vomiting [Diarrhea] : no diarrhea [Constipation] : no constipation [Dysphagia] : no dysphagia [Bleeding] : no bleeding [Food Intolerance] : no food intolerance [Hepatic Disease] : no hepatic disease [Negative] : Endocrine

## 2022-10-24 NOTE — HISTORY OF PRESENT ILLNESS
[Never] : never [TextBox_4] : INITIAL VISIT 9/2/2022:\par Ms. Jhaveri is 72 year old female with history of seasonal allergies, hyperlipidemia, anxiety and CLL stage 1 who now presents to office for initial pulmonary evaluation. \par \par Her chief concern is recent PNA. \par \par Patient reports she was admitted to hospitals with PNA from 8/11/22 - 8/15/2022. She states she was on IV antibiotics and finished oral antibiotics. Patient reports some residual fatigue and slight cough.\par Patient reports history of seasonal allergies and takes Zyrtec with benefit. She states she had GERD and is diet controlled. \par \par Patient reports she has CLL and is followed by Hem/Onco - currently on no treatment. \par \par Patient reports she is COVID vaccinated and boosted X 1. She reports she had COVID infection in 3/2020 and was hospitalized at [facility]. \par \par Patient denies fever, chills, SOB, wheezing, nasal congestion, runny nose or heartburn/reflux. \par \par VISIT TODAY 10/24/2022:\par Ms. Jhaveri is 72 year old female with history of seasonal allergies, hyperlipidemia, anxiety and CLL stage 1 who is here for follow up for recent abnormal CT and PNA.\par \par Pt completed her follow up CT scan- Previously noted opacities in the RUL are no longer present and have resolved.\par + cholelithiasis noted (but has been present since 2012 based on images/reports in PACs)\par \par She reports that she is in her normal state of health overall.\par She states that she is usually a very active person and walks a lot however she has not been doing that.  She has noticed feeling a little bit breathy with walking recently.  She does note that stairs are not a problem whatsoever she is not out of breath with walking up flights of stairs.\par \par She does note a lot of acid reflux–she has been feeling burning sensation in her chest and throat.  She is not on any medication for this.\par \par She denies any symptoms of pain to the epigastric area or RUQ.\par She never knew of the gallstones previously.\par She used to see Dr. Ferreira for GI.\par

## 2022-10-24 NOTE — ASSESSMENT
[FreeTextEntry1] : Ms. Jhaveri is 72 year old female with history of seasonal allergies, hyperlipidemia, anxiety and CLL stage 1 who is here for follow up for recent abnormal CT and PNA- which has resolved clinically and on CT scan.\par Now she is dealing with some probable deconditioning, GERD and was made aware of her Gallstone hx. \par \par 1.PNA\par -resolved\par \par 2. Abnormal chest CT \par - RUL opacities resolved\par -gallstones noted- pt to see GI, aside from reflux- no symptoms\par \par 3. Seasonal allergies\par -continue Zyrtec\par \par 4. GERD with flare\par -add omeprazole 40 mg PO 30 min before breakfast\par -add famotidine 40 mg PO QHS\par -see GI: Dr. Ferreira\par -get gallstone hx evaluated\par \par 5. CLL\par -follow up with Hem/Onc\par \par 6.Slight SOB with walking\par -deconditioned likely\par -reports able to do stairs without issue\par -will monitor and re-assess, pt to keep us posted\par \par \par Patient to follow up in 3 months\par Patient to call with further questions and concerns.\par Patient verbalizes understanding of care plan and is agreeable.\par

## 2022-12-06 ENCOUNTER — APPOINTMENT (OUTPATIENT)
Dept: HEMATOLOGY ONCOLOGY | Facility: CLINIC | Age: 72
End: 2022-12-06

## 2022-12-06 VITALS
SYSTOLIC BLOOD PRESSURE: 150 MMHG | OXYGEN SATURATION: 97 % | DIASTOLIC BLOOD PRESSURE: 74 MMHG | WEIGHT: 152 LBS | BODY MASS INDEX: 25.29 KG/M2 | HEART RATE: 75 BPM

## 2022-12-06 PROCEDURE — 99215 OFFICE O/P EST HI 40 MIN: CPT | Mod: 25,GC

## 2022-12-06 PROCEDURE — 85025 COMPLETE CBC W/AUTO DIFF WBC: CPT | Mod: GC

## 2022-12-06 NOTE — ASSESSMENT
[FreeTextEntry1] : This is a 72 year old female with Stage I CLL, del 13q, continues to have stable disease and remains asymptomatic. \par \par \par #CLL\par - CBC reviewed with patient- stable, she is without B sx - no indication for CLL directed therapy currently \par -needs to follow up with PCP for better continuity of care for her elevated BP, likely due to stress and anxiety\par - we have sent referral for psychiatry/psychologist-patient currently considering it, saw an outside provider who she does not want to conitnue seeing \par - she is due for routine HCM including yearly physical, colonoscopy, derm eval, mammogram, has deferred previously, encouraged again\par - f/u in 3 months, sooner PRN\par \par

## 2022-12-06 NOTE — END OF VISIT
[FreeTextEntry3] : Patient seen and case discussed with ABNER Holley. I agree with above and have edited the note where needed.\par  [Time Spent: ___ minutes] : I have spent [unfilled] minutes of time on the encounter.

## 2022-12-06 NOTE — HISTORY OF PRESENT ILLNESS
[de-identified] : 73 yo female with hx of CLL- Stage I diagnosed 1/2012, del 13q. Treatment naive\par \par patient had been admitted to [facility] 8/11-8/15 with right upper lobe pneumonia - she was given ceftriaxone and doxycycline, with improvement in symptoms and was discharged.  [de-identified] : 12/6/22 Patient without any new complaints.  She continues to have anxiety about many things, especially her medical condition.  She has no night sweats, no weight loss, no fever/chills. She has no cough, no SOB, no abdominal c/o. Pt had COVID 4/2021. She is vaccinated. Pt was admitted to [facility] 8/2022 for PNA. Pt states she is having trouble sleeping and anxiety due to her diagnosis. Pt followed up with her PCP but has not yet seen dermatology; no recent mammo/pap smear.  Pt states she had an appt with a therapist but did not feel the session provided her with any benefit\par \par

## 2022-12-06 NOTE — RESULTS/DATA
[FreeTextEntry1] : cbc done 12/6/22\par wbc- 107.6\par hgb- 12.3\par hct - 38.8\par plt- 154\par alc - 90.24\par anc - 4.73

## 2022-12-07 LAB
ALBUMIN SERPL ELPH-MCNC: 4.6 G/DL
ALP BLD-CCNC: 87 U/L
ALT SERPL-CCNC: 15 U/L
ANION GAP SERPL CALC-SCNC: 10 MMOL/L
AST SERPL-CCNC: 21 U/L
BILIRUB SERPL-MCNC: 0.3 MG/DL
BUN SERPL-MCNC: 15 MG/DL
CALCIUM SERPL-MCNC: 10.1 MG/DL
CHLORIDE SERPL-SCNC: 104 MMOL/L
CO2 SERPL-SCNC: 29 MMOL/L
CREAT SERPL-MCNC: 0.65 MG/DL
EGFR: 94 ML/MIN/1.73M2
GLUCOSE SERPL-MCNC: 120 MG/DL
LDH SERPL-CCNC: 171 U/L
MAGNESIUM SERPL-MCNC: 2.2 MG/DL
PHOSPHATE SERPL-MCNC: 2.6 MG/DL
POTASSIUM SERPL-SCNC: 3.4 MMOL/L
PROT SERPL-MCNC: 6 G/DL
SODIUM SERPL-SCNC: 143 MMOL/L
URATE SERPL-MCNC: 4.7 MG/DL

## 2023-01-17 ENCOUNTER — NON-APPOINTMENT (OUTPATIENT)
Age: 73
End: 2023-01-17

## 2023-01-17 ENCOUNTER — APPOINTMENT (OUTPATIENT)
Dept: PULMONOLOGY | Facility: CLINIC | Age: 73
End: 2023-01-17
Payer: MEDICARE

## 2023-01-17 VITALS
TEMPERATURE: 98 F | BODY MASS INDEX: 24.16 KG/M2 | OXYGEN SATURATION: 95 % | WEIGHT: 145 LBS | HEART RATE: 68 BPM | RESPIRATION RATE: 16 BRPM | SYSTOLIC BLOOD PRESSURE: 138 MMHG | DIASTOLIC BLOOD PRESSURE: 80 MMHG | HEIGHT: 65 IN

## 2023-01-17 DIAGNOSIS — R06.02 SHORTNESS OF BREATH: ICD-10-CM

## 2023-01-17 DIAGNOSIS — J30.2 OTHER SEASONAL ALLERGIC RHINITIS: ICD-10-CM

## 2023-01-17 DIAGNOSIS — R09.82 POSTNASAL DRIP: ICD-10-CM

## 2023-01-17 DIAGNOSIS — R93.89 ABNORMAL FINDINGS ON DIAGNOSTIC IMAGING OF OTHER SPECIFIED BODY STRUCTURES: ICD-10-CM

## 2023-01-17 PROCEDURE — 99213 OFFICE O/P EST LOW 20 MIN: CPT | Mod: 25

## 2023-01-17 PROCEDURE — 94010 BREATHING CAPACITY TEST: CPT

## 2023-01-17 NOTE — REASON FOR VISIT
Render Post-Care Instructions In Note?: no Detail Level: Simple Post-Care Instructions: I reviewed with the patient in detail post-care instructions. Patient is to wear sunprotection, and avoid picking at any of the treated lesions. Pt may apply Vaseline to crusted or scabbing areas. Consent: The patient's consent was obtained including but not limited to risks of crusting, scabbing, blistering, scarring, darker or lighter pigmentary change, recurrence, incomplete removal and infection. Show Applicator Variable?: Yes Duration Of Freeze Thaw-Cycle (Seconds): 0 [Follow-Up] : a follow-up visit [Pneumonia] : pneumonia [Cough] : cough 76.3

## 2023-01-17 NOTE — REVIEW OF SYSTEMS
[Cough] : no cough [Hemoptysis] : no hemoptysis [Chest Tightness] : no chest tightness [Frequent URIs] : no frequent URIs [Sputum] : no sputum [Dyspnea] : no dyspnea [Pleuritic Pain] : no pleuritic pain [Wheezing] : no wheezing [A.M. Dry Mouth] : no a.m. dry mouth [SOB on Exertion] : no sob on exertion [Hay Fever] : no hay fever [Watery Eyes] : no watery eyes [Itchy Eyes] : no itchy eyes [Seasonal Allergies] : seasonal allergies [Nasal Discharge] : no nasal discharge [Hives] : no hives [Angioedema] : no angioedema [Immunocompromised] : not immunocompromised [GERD] : gerd [Abdominal Pain] : no abdominal pain [Nausea] : no nausea [Vomiting] : no vomiting [Diarrhea] : no diarrhea [Constipation] : no constipation [Dysphagia] : no dysphagia [Bleeding] : no bleeding [Food Intolerance] : no food intolerance [Hepatic Disease] : no hepatic disease [Negative] : Endocrine

## 2023-01-17 NOTE — PHYSICAL EXAM
[No Acute Distress] : no acute distress [Well Nourished] : well nourished [Well Groomed] : well groomed [No Deformities] : no deformities [Well Developed] : well developed [Normal Oropharynx] : normal oropharynx [I] : Mallampati Class: I [Normal Appearance] : normal appearance [Supple] : supple [No Neck Mass] : no neck mass [No JVD] : no jvd [Normal Rate/Rhythm] : normal rate/rhythm [Normal S1, S2] : normal s1, s2 [No Resp Distress] : no resp distress [No Acc Muscle Use] : no acc muscle use [Normal Palpation] : normal palpation [Normal Rhythm and Effort] : normal rhythm and effort [Clear to Auscultation Bilaterally] : clear to auscultation bilaterally [No Abnormalities] : no abnormalities [Benign] : benign [Normal Gait] : normal gait [No Clubbing] : no clubbing [No Cyanosis] : no cyanosis [No Edema] : no edema [FROM] : FROM [Normal Color/ Pigmentation] : normal color/ pigmentation [No Focal Deficits] : no focal deficits [Oriented x3] : oriented x3 [Normal Mood] : normal mood [Normal Affect] : normal affect [Remote Memory Normal] : remote memory normal

## 2023-01-17 NOTE — HISTORY OF PRESENT ILLNESS
[TextBox_4] : INITIAL VISIT 9/2/2022:\par Ms. Jhaveri is 72 year old female with history of seasonal allergies, hyperlipidemia, anxiety and CLL stage 1 who now presents to office for initial pulmonary evaluation. \par \par Her chief concern is recent PNA. \par \par Patient reports she was admitted to hospitals with PNA from 8/11/22 - 8/15/2022. She states she was on IV antibiotics and finished oral antibiotics. Patient reports some residual fatigue and slight cough.\par Patient reports history of seasonal allergies and takes Zyrtec with benefit. She states she had GERD and is diet controlled. \par \par Patient reports she has CLL and is followed by Hem/Onco - currently on no treatment. \par \par Patient reports she is COVID vaccinated and boosted X 1. She reports she had COVID infection in 3/2020 and was hospitalized at [facility]. \par \par Patient denies fever, chills, SOB, wheezing, nasal congestion, runny nose or heartburn/reflux. \par \par VISIT 10/24/2022:\par Ms. Jhaveri is 72 year old female with history of seasonal allergies, hyperlipidemia, anxiety and CLL stage 1 who is here for follow up for recent abnormal CT and PNA.\par \par Pt completed her follow up CT scan- Previously noted opacities in the RUL are no longer present and have resolved.\par + cholelithiasis noted (but has been present since 2012 based on images/reports in PACs)\par \par She reports that she is in her normal state of health overall.\par She states that she is usually a very active person and walks a lot however she has not been doing that.  She has noticed feeling a little bit breathy with walking recently.  She does note that stairs are not a problem whatsoever she is not out of breath with walking up flights of stairs.\par \par She does note a lot of acid reflux–she has been feeling burning sensation in her chest and throat.  She is not on any medication for this.\par \par She denies any symptoms of pain to the epigastric area or RUQ.\par She never knew of the gallstones previously.\par She used to see Dr. Ferreira for GI.\par \par \par VISIT TODAY 1/17/2023:\par Ms. Jhaveri is 72 year old female with history of seasonal allergies, hyperlipidemia, anxiety and CLL stage 1 who is here for follow up due to hx of PNA. \par She reports that she is in her normal state of health overall.\par She has not made any more strides with getting back to her usual activity levels. She has been more sedentary overall. \par She continues to report feeling somewhat breathy with walking but also states that she has no trouble with taking stairs.  She has no issues going up and down stairs multiple times a day.  She does not get out of breath or winded.\par \par She admits to dealing with a lot of anxiety seems to trigger the feeling of shortness of breath. She has been started on low dose xanax PRN. She is scared to take it or build a tolerance to it. \par When she is taking stairs, she focuses on each step at a time whereas with walking she thinks about things.\par \par Patient needs a refill of her famotidine.  She saw great relief of her GERD symptoms with omeprazole and famotidine.\par She did not follow-up with Dr. Ferreira for gastro.  She plans to do so but completely forgot.  I did remind her of the gallstones noted on her CT scan. \par \par No new complaints. \par \par

## 2023-01-17 NOTE — ASSESSMENT
[FreeTextEntry1] : Ms. Jhaveri is 72 year old female with history of seasonal allergies, hyperlipidemia, anxiety and CLL stage 1 who is here for follow up due to hx of PNA. Follow up CT reveals it has resolved.\par Now she is dealing with some probable deconditioning, anxiety, GERD and pt was reminded to follow up with her GI MD for Gallstone hx. \par \par 1. Abnormal chest CT- resolved/hx of PNA\par - RUL opacities resolved\par -gallstones noted- pt to see GI, aside from reflux- no symptoms\par \par 2. Seasonal allergies with intermittent post nasal drip\par -continue Zyrtec PRN\par -add Azelastine nasal spray 2 sprays in am and pm PRN\par \par 3. GERD with flare\par -continue omeprazole 40 mg PO 30 min before breakfast\par -restart famotidine 40 mg PO QHS\par -see GI: Dr. Ferreira\par -get gallstone hx evaluated\par \par 4. CLL\par -follow up with Hem/Onc\par \par 5.Slight SOB with walking\par -deconditioned likely- advised her to increase her activity/push herself. After some training/some conditioning, if still dealing with SOB, to follow up here. \par -reports able to do stairs without issue\par -underlying anxiety may be contributing\par \par 6.Anxiety\par -on xanax- uses sparingly/at times prior to bed\par -consider Hylands Nerve tonic (natural supplement)\par \par Patient to follow up in 6 months\par Patient to call with further questions and concerns.\par Patient verbalizes understanding of care plan and is agreeable.\par

## 2023-04-04 ENCOUNTER — APPOINTMENT (OUTPATIENT)
Dept: HEMATOLOGY ONCOLOGY | Facility: CLINIC | Age: 73
End: 2023-04-04

## 2023-04-14 ENCOUNTER — OUTPATIENT (OUTPATIENT)
Dept: OUTPATIENT SERVICES | Facility: HOSPITAL | Age: 73
LOS: 1 days | Discharge: HOME | End: 2023-04-14

## 2023-04-14 DIAGNOSIS — C91.10 CHRONIC LYMPHOCYTIC LEUKEMIA OF B-CELL TYPE NOT HAVING ACHIEVED REMISSION: ICD-10-CM

## 2023-04-18 ENCOUNTER — RESULT REVIEW (OUTPATIENT)
Age: 73
End: 2023-04-18

## 2023-04-18 ENCOUNTER — APPOINTMENT (OUTPATIENT)
Dept: HEMATOLOGY ONCOLOGY | Facility: CLINIC | Age: 73
End: 2023-04-18
Payer: MEDICARE

## 2023-04-18 VITALS
WEIGHT: 153.99 LBS | RESPIRATION RATE: 16 BRPM | OXYGEN SATURATION: 98 % | BODY MASS INDEX: 25.63 KG/M2 | HEART RATE: 63 BPM | SYSTOLIC BLOOD PRESSURE: 157 MMHG | DIASTOLIC BLOOD PRESSURE: 75 MMHG | TEMPERATURE: 96.6 F

## 2023-04-18 LAB
ALBUMIN SERPL ELPH-MCNC: 4.6 G/DL
ALP BLD-CCNC: 91 U/L
ALT SERPL-CCNC: 15 U/L
ANION GAP SERPL CALC-SCNC: 12 MMOL/L
AST SERPL-CCNC: 22 U/L
BASOPHILS # BLD AUTO: 0 K/UL — SIGNIFICANT CHANGE UP (ref 0–0.2)
BASOPHILS NFR BLD AUTO: 0 % — SIGNIFICANT CHANGE UP (ref 0–2)
BILIRUB SERPL-MCNC: 0.3 MG/DL
BUN SERPL-MCNC: 16 MG/DL
CALCIUM SERPL-MCNC: 10.9 MG/DL
CHLORIDE SERPL-SCNC: 103 MMOL/L
CO2 SERPL-SCNC: 27 MMOL/L
CREAT SERPL-MCNC: 0.61 MG/DL
EGFR: 96 ML/MIN/1.73M2
EOSINOPHIL # BLD AUTO: 0 K/UL — SIGNIFICANT CHANGE UP (ref 0–0.5)
EOSINOPHIL NFR BLD AUTO: 0 % — SIGNIFICANT CHANGE UP (ref 0–6)
GLUCOSE SERPL-MCNC: 106 MG/DL
HCT VFR BLD CALC: 40 % — SIGNIFICANT CHANGE UP (ref 34.5–45)
HGB BLD-MCNC: 12.4 G/DL — SIGNIFICANT CHANGE UP (ref 11.5–15.5)
LDH SERPL-CCNC: 162 U/L
LYMPHOCYTES # BLD AUTO: 106.56 K/UL — HIGH (ref 1–3.3)
LYMPHOCYTES # BLD AUTO: 96 % — HIGH (ref 13–44)
MAGNESIUM SERPL-MCNC: 2.2 MG/DL
MCHC RBC-ENTMCNC: 28.4 PG — SIGNIFICANT CHANGE UP (ref 27–34)
MCHC RBC-ENTMCNC: 31 G/DL — LOW (ref 32–36)
MCV RBC AUTO: 91.5 FL — SIGNIFICANT CHANGE UP (ref 80–100)
MONOCYTES # BLD AUTO: 1.11 K/UL — HIGH (ref 0–0.9)
MONOCYTES NFR BLD AUTO: 1 % — LOW (ref 2–14)
NEUTROPHILS # BLD AUTO: 3.33 K/UL — SIGNIFICANT CHANGE UP (ref 1.8–7.4)
NEUTROPHILS NFR BLD AUTO: 3 % — LOW (ref 43–77)
NRBC # BLD: 0 /100 — SIGNIFICANT CHANGE UP (ref 0–0)
NRBC # BLD: SIGNIFICANT CHANGE UP /100 WBCS (ref 0–0)
PHOSPHATE SERPL-MCNC: 3.3 MG/DL
PLAT MORPH BLD: NORMAL — SIGNIFICANT CHANGE UP
PLATELET # BLD AUTO: 158 K/UL — SIGNIFICANT CHANGE UP (ref 150–400)
POTASSIUM SERPL-SCNC: 3.9 MMOL/L
PROT SERPL-MCNC: 6.4 G/DL
RBC # BLD: 4.37 M/UL — SIGNIFICANT CHANGE UP (ref 3.8–5.2)
RBC # FLD: 15.6 % — HIGH (ref 10.3–14.5)
RBC BLD AUTO: SIGNIFICANT CHANGE UP
SMUDGE CELLS # BLD: PRESENT — SIGNIFICANT CHANGE UP
SODIUM SERPL-SCNC: 142 MMOL/L
URATE SERPL-MCNC: 4 MG/DL
WBC # BLD: 111 K/UL — CRITICAL HIGH (ref 3.8–10.5)
WBC # FLD AUTO: 111 K/UL — CRITICAL HIGH (ref 3.8–10.5)

## 2023-04-18 PROCEDURE — 99214 OFFICE O/P EST MOD 30 MIN: CPT | Mod: GC

## 2023-04-19 LAB
24R-OH-CALCIDIOL SERPL-MCNC: 88.9 PG/ML
25(OH)D3 SERPL-MCNC: 30.9 NG/ML
CALCIUM SERPL-MCNC: 10.8 MG/DL
PARATHYROID HORMONE INTACT: 48 PG/ML

## 2023-04-19 NOTE — END OF VISIT
[] : Fellow [FreeTextEntry3] : Patient seen and case discussed with Dr Lomeli. I agree with above and have edited the note where needed.\par  [Time Spent: ___ minutes] : I have spent [unfilled] minutes of time on the encounter.

## 2023-04-19 NOTE — ASSESSMENT
[FreeTextEntry1] : This is a 72 year old female with Stage I CLL, del 13q, continues to have stable disease and remains asymptomatic. \par \par \par #CLL\par - CBC reviewed today; counts stable, no indication for therapy\par - discussed healthcare maintenance:\par   - will follow-up with primary care for mammogram; counseled to get the Shingrix, Pneumococcal, and Covid bivalent vaccines\par   - will see dermatology\par   - next due for colonoscopy in 1yr\par - previously saw a psychiatrist/psychologist for anxiety but provider is now out of network; she will find another provider\par \par #Hypercalcemia\par -intermittent \par -check PTH, PTHrp, 25-Vitamin D, 1,25-Vitamin D\par -monitor if related to CLL, recheck at each visit \par \par RTC in 4mo or sooner PRN\par \par

## 2023-04-19 NOTE — HISTORY OF PRESENT ILLNESS
[de-identified] : 73 yo female with hx of CLL- Stage I diagnosed 1/2012, del 13q. Treatment naive\par \par Patient had been admitted to [facility] 8/11-8/15 with right upper lobe pneumonia - she was given ceftriaxone and doxycycline, with improvement in symptoms and was discharged.  [de-identified] : Doing well since last visit. Denies any fevers, chills, night sweats, fatigue, weight loss. Endorses small axillary LN that is unchanged. Denies any new meds, hospitalizations, infections.

## 2023-08-07 ENCOUNTER — OUTPATIENT (OUTPATIENT)
Dept: OUTPATIENT SERVICES | Facility: HOSPITAL | Age: 73
LOS: 1 days | Discharge: HOME | End: 2023-08-07

## 2023-08-07 DIAGNOSIS — C91.10 CHRONIC LYMPHOCYTIC LEUKEMIA OF B-CELL TYPE NOT HAVING ACHIEVED REMISSION: ICD-10-CM

## 2023-08-14 ENCOUNTER — LABORATORY RESULT (OUTPATIENT)
Age: 73
End: 2023-08-14

## 2023-08-14 ENCOUNTER — APPOINTMENT (OUTPATIENT)
Dept: HEMATOLOGY ONCOLOGY | Facility: CLINIC | Age: 73
End: 2023-08-14
Payer: MEDICARE

## 2023-08-14 ENCOUNTER — RESULT REVIEW (OUTPATIENT)
Age: 73
End: 2023-08-14

## 2023-08-14 VITALS
WEIGHT: 149.69 LBS | BODY MASS INDEX: 25.87 KG/M2 | HEART RATE: 61 BPM | HEIGHT: 63.74 IN | RESPIRATION RATE: 16 BRPM | OXYGEN SATURATION: 99 % | DIASTOLIC BLOOD PRESSURE: 76 MMHG | TEMPERATURE: 97 F | SYSTOLIC BLOOD PRESSURE: 170 MMHG

## 2023-08-14 DIAGNOSIS — I10 ESSENTIAL (PRIMARY) HYPERTENSION: ICD-10-CM

## 2023-08-14 DIAGNOSIS — Z63.5 DISRUPTION OF FAMILY BY SEPARATION AND DIVORCE: ICD-10-CM

## 2023-08-14 LAB
BASOPHILS # BLD AUTO: 0 K/UL — SIGNIFICANT CHANGE UP (ref 0–0.2)
BASOPHILS NFR BLD AUTO: 0 % — SIGNIFICANT CHANGE UP (ref 0–2)
EOSINOPHIL # BLD AUTO: 0 K/UL — SIGNIFICANT CHANGE UP (ref 0–0.5)
EOSINOPHIL NFR BLD AUTO: 0 % — SIGNIFICANT CHANGE UP (ref 0–6)
HCT VFR BLD CALC: 38.9 % — SIGNIFICANT CHANGE UP (ref 34.5–45)
HGB BLD-MCNC: 11.7 G/DL — SIGNIFICANT CHANGE UP (ref 11.5–15.5)
LYMPHOCYTES # BLD AUTO: 110.85 K/UL — HIGH (ref 1–3.3)
LYMPHOCYTES # BLD AUTO: 97 % — HIGH (ref 13–44)
MCHC RBC-ENTMCNC: 28.4 PG — SIGNIFICANT CHANGE UP (ref 27–34)
MCHC RBC-ENTMCNC: 30.1 G/DL — LOW (ref 32–36)
MCV RBC AUTO: 94.4 FL — SIGNIFICANT CHANGE UP (ref 80–100)
MONOCYTES # BLD AUTO: 1.14 K/UL — HIGH (ref 0–0.9)
MONOCYTES NFR BLD AUTO: 1 % — LOW (ref 2–14)
NEUTROPHILS # BLD AUTO: 2.29 K/UL — SIGNIFICANT CHANGE UP (ref 1.8–7.4)
NEUTROPHILS NFR BLD AUTO: 2 % — LOW (ref 43–77)
NRBC # BLD: 0 /100 — SIGNIFICANT CHANGE UP (ref 0–0)
NRBC # BLD: SIGNIFICANT CHANGE UP /100 WBCS (ref 0–0)
PLAT MORPH BLD: NORMAL — SIGNIFICANT CHANGE UP
PLATELET # BLD AUTO: 125 K/UL — LOW (ref 150–400)
RBC # BLD: 4.12 M/UL — SIGNIFICANT CHANGE UP (ref 3.8–5.2)
RBC # FLD: 15.3 % — HIGH (ref 10.3–14.5)
RBC BLD AUTO: SIGNIFICANT CHANGE UP
SMUDGE CELLS # BLD: PRESENT — SIGNIFICANT CHANGE UP
WBC # BLD: 114.28 K/UL — CRITICAL HIGH (ref 3.8–10.5)
WBC # FLD AUTO: 114.28 K/UL — CRITICAL HIGH (ref 3.8–10.5)

## 2023-08-14 PROCEDURE — 99213 OFFICE O/P EST LOW 20 MIN: CPT

## 2023-08-14 RX ORDER — AZELASTINE HYDROCHLORIDE 137 UG/1
0.1 SPRAY, METERED NASAL TWICE DAILY
Qty: 1 | Refills: 3 | Status: DISCONTINUED | COMMUNITY
Start: 2023-01-17 | End: 2023-08-14

## 2023-08-14 RX ORDER — ALPRAZOLAM 0.5 MG/1
0.5 TABLET ORAL
Qty: 30 | Refills: 0 | Status: ACTIVE | COMMUNITY
Start: 2023-08-07

## 2023-08-14 SDOH — SOCIAL STABILITY - SOCIAL INSECURITY: DISRUPTION OF FAMILY BY SEPARATION AND DIVORCE: Z63.5

## 2023-08-15 LAB
ALBUMIN SERPL ELPH-MCNC: 4.5 G/DL
ALP BLD-CCNC: 80 U/L
ALT SERPL-CCNC: 12 U/L
ANION GAP SERPL CALC-SCNC: 12 MMOL/L
AST SERPL-CCNC: 23 U/L
BILIRUB SERPL-MCNC: 0.4 MG/DL
BUN SERPL-MCNC: 15 MG/DL
CALCIUM SERPL-MCNC: 10.5 MG/DL
CHLORIDE SERPL-SCNC: 104 MMOL/L
CO2 SERPL-SCNC: 24 MMOL/L
CREAT SERPL-MCNC: 0.61 MG/DL
EGFR: 95 ML/MIN/1.73M2
GLUCOSE SERPL-MCNC: 76 MG/DL
LDH SERPL-CCNC: 186 U/L
MAGNESIUM SERPL-MCNC: 2 MG/DL
PHOSPHATE SERPL-MCNC: 2.6 MG/DL
POTASSIUM SERPL-SCNC: 3.7 MMOL/L
PROT SERPL-MCNC: 6.2 G/DL
SODIUM SERPL-SCNC: 140 MMOL/L
URATE SERPL-MCNC: 3.9 MG/DL

## 2023-08-15 NOTE — HISTORY OF PRESENT ILLNESS
[de-identified] : 73 yo female with hx of CLL- Stage I diagnosed 1/2012, del(13q).  Treatment naive  Patient was admitted to [facility] 8/11-8/15/2022 with right upper lobe pneumonia - she was given ceftriaxone and doxycycline,  with improvement in symptoms and was discharged.  [de-identified] : Remains w/o constitutional c/o no new resp infections

## 2023-08-15 NOTE — ASSESSMENT
[Palliative] : Goals of care discussed with patient: Palliative [Palliative Care Plan] : not applicable at this time [FreeTextEntry1] : This is a 72 year old female with Stage I CLL, del(13q) continues to have stable disease and remains asymptomatic.   CLL  CBC reviewed today  .28, Hgb 11.7, plt 125K, ANC 2.29   lymph doubling time not reached for several years continue observation   Hypogammaglobulinemia- no excess incidence of resp infections Hypercalcemia intermittently noted in past, mild, has normal intact PTH, highly unlikely to be related to CLL   check CMP, LDH, immunoglobulins  RTC in 4 mo

## 2023-08-15 NOTE — END OF VISIT
[] : Fellow [Time Spent: ___ minutes] : I have spent [unfilled] minutes of time on the encounter. [FreeTextEntry3] : Patient seen and case discussed with Dr Lomeli. I agree with above and have edited the note where needed.\par

## 2023-08-15 NOTE — PHYSICAL EXAM
[Fully active, able to carry on all pre-disease performance without restriction] : Status 0 - Fully active, able to carry on all pre-disease performance without restriction [Normal] : normal spine exam without palpable tenderness, no kyphosis or scoliosis [de-identified] : subcm cervical nodes, mobile 1 cm right axillary node

## 2023-08-17 ENCOUNTER — APPOINTMENT (OUTPATIENT)
Dept: HEMATOLOGY ONCOLOGY | Facility: CLINIC | Age: 73
End: 2023-08-17

## 2023-12-01 ENCOUNTER — OUTPATIENT (OUTPATIENT)
Dept: OUTPATIENT SERVICES | Facility: HOSPITAL | Age: 73
LOS: 1 days | Discharge: HOME | End: 2023-12-01

## 2023-12-01 DIAGNOSIS — C91.10 CHRONIC LYMPHOCYTIC LEUKEMIA OF B-CELL TYPE NOT HAVING ACHIEVED REMISSION: ICD-10-CM

## 2023-12-18 ENCOUNTER — APPOINTMENT (OUTPATIENT)
Dept: HEMATOLOGY ONCOLOGY | Facility: CLINIC | Age: 73
End: 2023-12-18
Payer: MEDICARE

## 2023-12-18 ENCOUNTER — RESULT REVIEW (OUTPATIENT)
Age: 73
End: 2023-12-18

## 2023-12-18 VITALS
SYSTOLIC BLOOD PRESSURE: 163 MMHG | DIASTOLIC BLOOD PRESSURE: 83 MMHG | BODY MASS INDEX: 26.17 KG/M2 | HEART RATE: 61 BPM | TEMPERATURE: 98.4 F | RESPIRATION RATE: 16 BRPM | OXYGEN SATURATION: 98 % | WEIGHT: 151.22 LBS

## 2023-12-18 LAB
ALBUMIN SERPL ELPH-MCNC: 4.6 G/DL
ALP BLD-CCNC: 74 U/L
ALT SERPL-CCNC: 14 U/L
ANION GAP SERPL CALC-SCNC: 11 MMOL/L
AST SERPL-CCNC: 21 U/L
BASOPHILS # BLD AUTO: 0 K/UL — SIGNIFICANT CHANGE UP (ref 0–0.2)
BASOPHILS # BLD AUTO: 0 K/UL — SIGNIFICANT CHANGE UP (ref 0–0.2)
BASOPHILS NFR BLD AUTO: 0 % — SIGNIFICANT CHANGE UP (ref 0–2)
BASOPHILS NFR BLD AUTO: 0 % — SIGNIFICANT CHANGE UP (ref 0–2)
BILIRUB SERPL-MCNC: 0.4 MG/DL
BUN SERPL-MCNC: 14 MG/DL
CALCIUM SERPL-MCNC: 10.4 MG/DL
CHLORIDE SERPL-SCNC: 106 MMOL/L
CO2 SERPL-SCNC: 26 MMOL/L
CREAT SERPL-MCNC: 0.57 MG/DL
EGFR: 96 ML/MIN/1.73M2
EOSINOPHIL # BLD AUTO: 0 K/UL — SIGNIFICANT CHANGE UP (ref 0–0.5)
EOSINOPHIL # BLD AUTO: 0 K/UL — SIGNIFICANT CHANGE UP (ref 0–0.5)
EOSINOPHIL NFR BLD AUTO: 0 % — SIGNIFICANT CHANGE UP (ref 0–6)
EOSINOPHIL NFR BLD AUTO: 0 % — SIGNIFICANT CHANGE UP (ref 0–6)
GLUCOSE SERPL-MCNC: 100 MG/DL
HCT VFR BLD CALC: 37.5 % — SIGNIFICANT CHANGE UP (ref 34.5–45)
HCT VFR BLD CALC: 37.5 % — SIGNIFICANT CHANGE UP (ref 34.5–45)
HGB BLD-MCNC: 11.7 G/DL — SIGNIFICANT CHANGE UP (ref 11.5–15.5)
HGB BLD-MCNC: 11.7 G/DL — SIGNIFICANT CHANGE UP (ref 11.5–15.5)
LDH SERPL-CCNC: 176 U/L
LYMPHOCYTES # BLD AUTO: 95 % — HIGH (ref 13–44)
LYMPHOCYTES # BLD AUTO: 95 % — HIGH (ref 13–44)
LYMPHOCYTES # BLD AUTO: 96.48 K/UL — HIGH (ref 1–3.3)
LYMPHOCYTES # BLD AUTO: 96.48 K/UL — HIGH (ref 1–3.3)
LYMPHOCYTES # SPEC AUTO: 4 % — HIGH (ref 0–0)
LYMPHOCYTES # SPEC AUTO: 4 % — HIGH (ref 0–0)
MCHC RBC-ENTMCNC: 29.3 PG — SIGNIFICANT CHANGE UP (ref 27–34)
MCHC RBC-ENTMCNC: 29.3 PG — SIGNIFICANT CHANGE UP (ref 27–34)
MCHC RBC-ENTMCNC: 31.2 G/DL — LOW (ref 32–36)
MCHC RBC-ENTMCNC: 31.2 G/DL — LOW (ref 32–36)
MCV RBC AUTO: 94 FL — SIGNIFICANT CHANGE UP (ref 80–100)
MCV RBC AUTO: 94 FL — SIGNIFICANT CHANGE UP (ref 80–100)
MONOCYTES # BLD AUTO: 0 K/UL — SIGNIFICANT CHANGE UP (ref 0–0.9)
MONOCYTES # BLD AUTO: 0 K/UL — SIGNIFICANT CHANGE UP (ref 0–0.9)
MONOCYTES NFR BLD AUTO: 0 % — LOW (ref 2–14)
MONOCYTES NFR BLD AUTO: 0 % — LOW (ref 2–14)
NEUTROPHILS # BLD AUTO: 1.02 K/UL — LOW (ref 1.8–7.4)
NEUTROPHILS # BLD AUTO: 1.02 K/UL — LOW (ref 1.8–7.4)
NEUTROPHILS NFR BLD AUTO: 1 % — LOW (ref 43–77)
NEUTROPHILS NFR BLD AUTO: 1 % — LOW (ref 43–77)
NRBC # BLD: 0 /100 — SIGNIFICANT CHANGE UP (ref 0–0)
NRBC # BLD: 0 /100 — SIGNIFICANT CHANGE UP (ref 0–0)
NRBC # BLD: SIGNIFICANT CHANGE UP /100 WBCS (ref 0–0)
NRBC # BLD: SIGNIFICANT CHANGE UP /100 WBCS (ref 0–0)
PLAT MORPH BLD: NORMAL — SIGNIFICANT CHANGE UP
PLAT MORPH BLD: NORMAL — SIGNIFICANT CHANGE UP
PLATELET # BLD AUTO: 126 K/UL — LOW (ref 150–400)
PLATELET # BLD AUTO: 126 K/UL — LOW (ref 150–400)
POTASSIUM SERPL-SCNC: 3.6 MMOL/L
PROT SERPL-MCNC: 6.2 G/DL
RBC # BLD: 3.99 M/UL — SIGNIFICANT CHANGE UP (ref 3.8–5.2)
RBC # BLD: 3.99 M/UL — SIGNIFICANT CHANGE UP (ref 3.8–5.2)
RBC # FLD: 15.3 % — HIGH (ref 10.3–14.5)
RBC # FLD: 15.3 % — HIGH (ref 10.3–14.5)
RBC BLD AUTO: SIGNIFICANT CHANGE UP
RBC BLD AUTO: SIGNIFICANT CHANGE UP
SMUDGE CELLS # BLD: PRESENT — SIGNIFICANT CHANGE UP
SMUDGE CELLS # BLD: PRESENT — SIGNIFICANT CHANGE UP
SODIUM SERPL-SCNC: 143 MMOL/L
URATE SERPL-MCNC: 4 MG/DL
WBC # BLD: 101.56 K/UL — CRITICAL HIGH (ref 3.8–10.5)
WBC # BLD: 101.56 K/UL — CRITICAL HIGH (ref 3.8–10.5)
WBC # FLD AUTO: 101.56 K/UL — CRITICAL HIGH (ref 3.8–10.5)
WBC # FLD AUTO: 101.56 K/UL — CRITICAL HIGH (ref 3.8–10.5)

## 2023-12-18 PROCEDURE — 99214 OFFICE O/P EST MOD 30 MIN: CPT

## 2023-12-20 LAB
DEPRECATED KAPPA LC FREE/LAMBDA SER: 1.33 RATIO
IGA SER QL IEP: 53 MG/DL
IGG SER QL IEP: 578 MG/DL
IGM SER QL IEP: 22 MG/DL
KAPPA LC CSF-MCNC: 0.76 MG/DL
KAPPA LC SERPL-MCNC: 1.01 MG/DL

## 2023-12-20 RX ORDER — FLUTICASONE PROPIONATE 50 UG/1
50 SPRAY, METERED NASAL
Qty: 16 | Refills: 0 | Status: ACTIVE | COMMUNITY
Start: 2023-09-24

## 2023-12-20 RX ORDER — AMOXICILLIN AND CLAVULANATE POTASSIUM 875; 125 MG/1; MG/1
875-125 TABLET, COATED ORAL
Qty: 20 | Refills: 0 | Status: COMPLETED | COMMUNITY
Start: 2023-09-24

## 2023-12-20 NOTE — HISTORY OF PRESENT ILLNESS
[de-identified] : 71 yo female with hx of CLL- Stage I diagnosed 1/2012, del(13q).  Treatment naive  Patient was admitted to [facility] 8/11-8/15/2022 with right upper lobe pneumonia - she was given ceftriaxone and doxycycline,  with improvement in symptoms and was discharged.  [de-identified] : Remains w/o constitutional c/o no new resp infections Ca normal on 8/4/23 no new c/o

## 2023-12-20 NOTE — ASSESSMENT
[Palliative] : Goals of care discussed with patient: Palliative [Palliative Care Plan] : not applicable at this time [FreeTextEntry1] : 72 yo woman with  Stage I CLL, continues to have stable disease and remains asymptomatic. she is therapy naive she has hypogammaglobulinemia but has not had frequent resp. infections  CBC reviewed with pt: .56, Hgb 11.7, plt 125K, ANC 1.02 lymph doubling time not reached for several years- has not doubled since 2016 low ANC for first time, has been > 2.0 in past  pt informed about infection risk expect this is a transient drop d/w pt risk of fever plan to repeat CBC, diff in 4 weeks  continue observation check CMP, LDH, immunoglobulins RV 3-4 mos

## 2023-12-20 NOTE — PHYSICAL EXAM
[Fully active, able to carry on all pre-disease performance without restriction] : Status 0 - Fully active, able to carry on all pre-disease performance without restriction [Normal] : affect appropriate [de-identified] : soft, NT +spleen tip [de-identified] : no CVAT [de-identified] : subcm cervical nodes

## 2024-01-10 LAB
ALBUMIN SERPL ELPH-MCNC: 4.6 G/DL
ALBUMIN SERPL ELPH-MCNC: 4.6 G/DL
ALP BLD-CCNC: 69 U/L
ALP BLD-CCNC: 82 U/L
ALT SERPL-CCNC: 16 U/L
ALT SERPL-CCNC: 19 U/L
ANION GAP SERPL CALC-SCNC: 10 MMOL/L
ANION GAP SERPL CALC-SCNC: 9 MMOL/L
AST SERPL-CCNC: 18 U/L
AST SERPL-CCNC: 25 U/L
BILIRUB SERPL-MCNC: 0.2 MG/DL
BILIRUB SERPL-MCNC: 0.3 MG/DL
BUN SERPL-MCNC: 17 MG/DL
BUN SERPL-MCNC: 18 MG/DL
CALCIUM SERPL-MCNC: 10.1 MG/DL
CALCIUM SERPL-MCNC: 10.2 MG/DL
CHLORIDE SERPL-SCNC: 102 MMOL/L
CHLORIDE SERPL-SCNC: 102 MMOL/L
CO2 SERPL-SCNC: 29 MMOL/L
CO2 SERPL-SCNC: 30 MMOL/L
CREAT SERPL-MCNC: 0.63 MG/DL
CREAT SERPL-MCNC: 0.8 MG/DL
GLUCOSE SERPL-MCNC: 112 MG/DL
GLUCOSE SERPL-MCNC: 119 MG/DL
LDH SERPL-CCNC: 153 U/L
LDH SERPL-CCNC: 161 U/L
POTASSIUM SERPL-SCNC: 3.5 MMOL/L
POTASSIUM SERPL-SCNC: 3.9 MMOL/L
PROT SERPL-MCNC: 6.1 G/DL
PROT SERPL-MCNC: 6.4 G/DL
SODIUM SERPL-SCNC: 141 MMOL/L
SODIUM SERPL-SCNC: 142 MMOL/L

## 2024-01-19 NOTE — DISCHARGE NOTE NURSING/CASE MANAGEMENT/SOCIAL WORK - NURSING SECTION COMPLETE
Patient/Caregiver provided printed discharge information.
My signature below certifies that the above stated patient is homebound and upon completion of the Face-To-Face encounter, has the need for intermittent skilled nursing, physical therapy and/or speech or occupational therapy services in their home for their current diagnosis as outlined in their initial plan of care. These services will continue to be monitored by myself or another physician.

## 2024-04-16 ENCOUNTER — OUTPATIENT (OUTPATIENT)
Dept: OUTPATIENT SERVICES | Facility: HOSPITAL | Age: 74
LOS: 1 days | Discharge: HOME | End: 2024-04-16

## 2024-04-16 DIAGNOSIS — C91.10 CHRONIC LYMPHOCYTIC LEUKEMIA OF B-CELL TYPE NOT HAVING ACHIEVED REMISSION: ICD-10-CM

## 2024-04-22 ENCOUNTER — APPOINTMENT (OUTPATIENT)
Dept: HEMATOLOGY ONCOLOGY | Facility: CLINIC | Age: 74
End: 2024-04-22
Payer: MEDICARE

## 2024-04-22 ENCOUNTER — RESULT REVIEW (OUTPATIENT)
Age: 74
End: 2024-04-22

## 2024-04-22 VITALS — DIASTOLIC BLOOD PRESSURE: 78 MMHG | SYSTOLIC BLOOD PRESSURE: 163 MMHG

## 2024-04-22 VITALS
TEMPERATURE: 98.2 F | WEIGHT: 152.56 LBS | RESPIRATION RATE: 16 BRPM | DIASTOLIC BLOOD PRESSURE: 77 MMHG | HEART RATE: 61 BPM | BODY MASS INDEX: 26.4 KG/M2 | SYSTOLIC BLOOD PRESSURE: 174 MMHG | OXYGEN SATURATION: 97 %

## 2024-04-22 DIAGNOSIS — K21.9 GASTRO-ESOPHAGEAL REFLUX DISEASE W/OUT ESOPHAGITIS: ICD-10-CM

## 2024-04-22 DIAGNOSIS — D70.9 NEUTROPENIA, UNSPECIFIED: ICD-10-CM

## 2024-04-22 DIAGNOSIS — E83.52 HYPERCALCEMIA: ICD-10-CM

## 2024-04-22 DIAGNOSIS — C91.10 CHRONIC LYMPHOCYTIC LEUKEMIA OF B-CELL TYPE NOT HAVING ACHIEVED REMISSION: ICD-10-CM

## 2024-04-22 DIAGNOSIS — F41.9 ANXIETY DISORDER, UNSPECIFIED: ICD-10-CM

## 2024-04-22 DIAGNOSIS — D80.1 NONFAMILIAL HYPOGAMMAGLOBULINEMIA: ICD-10-CM

## 2024-04-22 LAB
ALBUMIN SERPL ELPH-MCNC: 4.5 G/DL
ALP BLD-CCNC: 87 U/L
ALT SERPL-CCNC: 15 U/L
ANION GAP SERPL CALC-SCNC: 10 MMOL/L
AST SERPL-CCNC: 22 U/L
BASOPHILS # BLD AUTO: 0 K/UL — SIGNIFICANT CHANGE UP (ref 0–0.2)
BASOPHILS NFR BLD AUTO: 0 % — SIGNIFICANT CHANGE UP (ref 0–2)
BILIRUB SERPL-MCNC: 0.3 MG/DL
BUN SERPL-MCNC: 13 MG/DL
CALCIUM SERPL-MCNC: 10.7 MG/DL
CHLORIDE SERPL-SCNC: 104 MMOL/L
CO2 SERPL-SCNC: 29 MMOL/L
CREAT SERPL-MCNC: 0.66 MG/DL
DEPRECATED KAPPA LC FREE/LAMBDA SER: 1.03 RATIO
EGFR: 93 ML/MIN/1.73M2
EOSINOPHIL # BLD AUTO: 0 K/UL — SIGNIFICANT CHANGE UP (ref 0–0.5)
EOSINOPHIL NFR BLD AUTO: 0 % — SIGNIFICANT CHANGE UP (ref 0–6)
GLUCOSE SERPL-MCNC: 101 MG/DL
HCT VFR BLD CALC: 39 % — SIGNIFICANT CHANGE UP (ref 34.5–45)
HGB BLD-MCNC: 12.1 G/DL — SIGNIFICANT CHANGE UP (ref 11.5–15.5)
IGA SER QL IEP: 76 MG/DL
IGG SER QL IEP: 675 MG/DL
IGM SER QL IEP: 15 MG/DL
KAPPA LC CSF-MCNC: 0.99 MG/DL
KAPPA LC SERPL-MCNC: 1.02 MG/DL
LDH SERPL-CCNC: 173 U/L
LYMPHOCYTES # BLD AUTO: 115.27 K/UL — HIGH (ref 1–3.3)
LYMPHOCYTES # BLD AUTO: 97 % — HIGH (ref 13–44)
LYMPHOCYTES # SPEC AUTO: 2 % — HIGH (ref 0–0)
MCHC RBC-ENTMCNC: 29.2 PG — SIGNIFICANT CHANGE UP (ref 27–34)
MCHC RBC-ENTMCNC: 31 G/DL — LOW (ref 32–36)
MCV RBC AUTO: 94 FL — SIGNIFICANT CHANGE UP (ref 80–100)
MONOCYTES # BLD AUTO: 0 K/UL — SIGNIFICANT CHANGE UP (ref 0–0.9)
MONOCYTES NFR BLD AUTO: 0 % — LOW (ref 2–14)
NEUTROPHILS # BLD AUTO: 1.19 K/UL — LOW (ref 1.8–7.4)
NEUTROPHILS NFR BLD AUTO: 1 % — LOW (ref 43–77)
NRBC # BLD: 0 /100 WBCS — SIGNIFICANT CHANGE UP (ref 0–0)
NRBC # BLD: SIGNIFICANT CHANGE UP /100 WBCS (ref 0–0)
PLAT MORPH BLD: NORMAL — SIGNIFICANT CHANGE UP
PLATELET # BLD AUTO: 125 K/UL — LOW (ref 150–400)
POTASSIUM SERPL-SCNC: 3.8 MMOL/L
PROT SERPL-MCNC: 6.6 G/DL
RBC # BLD: 4.15 M/UL — SIGNIFICANT CHANGE UP (ref 3.8–5.2)
RBC # FLD: 15.8 % — HIGH (ref 10.3–14.5)
RBC BLD AUTO: SIGNIFICANT CHANGE UP
SMUDGE CELLS # BLD: PRESENT — SIGNIFICANT CHANGE UP
SODIUM SERPL-SCNC: 143 MMOL/L
URATE SERPL-MCNC: 4.4 MG/DL
WBC # BLD: 118.83 K/UL — CRITICAL HIGH (ref 3.8–10.5)
WBC # FLD AUTO: 118.83 K/UL — CRITICAL HIGH (ref 3.8–10.5)

## 2024-04-22 PROCEDURE — G2211 COMPLEX E/M VISIT ADD ON: CPT

## 2024-04-22 PROCEDURE — 99214 OFFICE O/P EST MOD 30 MIN: CPT

## 2024-04-22 NOTE — HISTORY OF PRESENT ILLNESS
[de-identified] : 73 yo female with hx of CLL- Stage I diagnosed 1/2012, del(13q).  Treatment naive  Patient was admitted to [facility] 8/11-8/15/2022 with right upper lobe pneumonia - she was given ceftriaxone and doxycycline,  with improvement in symptoms and was discharged.  [de-identified] : Remains w/o constitutional c/o no new resp infections Ca normal on 8/4/23, 12/23 after  incr to 10.9 4/23 no new c/o except transient episode of vertigo few wks ago no tinnitus slipped, fells, hit right side, no injury

## 2024-04-22 NOTE — PHYSICAL EXAM
[Fully active, able to carry on all pre-disease performance without restriction] : Status 0 - Fully active, able to carry on all pre-disease performance without restriction [Normal] : affect appropriate [de-identified] : soft, NT +spleen tip [de-identified] : no CVAT [de-identified] : subcm cervical nodes

## 2024-04-22 NOTE — ASSESSMENT
[Palliative] : Goals of care discussed with patient: Palliative [FreeTextEntry1] : 74 yo woman with  Stage I CLL, continues to have stable disease and remains asymptomatic. she is therapy naive she has hypogammaglobulinemia but has not had frequent resp. infections  CBC reviewed with pt: .56, Hgb 11.7, plt 125K, ANC 1.02 lymph doubling time not reached for several years- has not doubled since 2016 low ANC for first time, has been > 2.0 in past  pt informed about infection risk expect this is a transient drop d/w pt risk of fever plan to repeat CBC, diff in 4 weeks  continue observation check CMP, LDH, immunoglobulins RV 3-4 mos                   [Palliative Care Plan] : not applicable at this time

## 2024-04-26 LAB
25(OH)D3 SERPL-MCNC: 26.8 NG/ML
CALCIUM SERPL-MCNC: 10.8 MG/DL
PARATHYROID HORMONE INTACT: 37 PG/ML
PHOSPHATE SERPL-MCNC: 3 MG/DL

## 2024-08-12 ENCOUNTER — OUTPATIENT (OUTPATIENT)
Dept: OUTPATIENT SERVICES | Facility: HOSPITAL | Age: 74
LOS: 1 days | Discharge: HOME | End: 2024-08-12

## 2024-08-12 DIAGNOSIS — C91.10 CHRONIC LYMPHOCYTIC LEUKEMIA OF B-CELL TYPE NOT HAVING ACHIEVED REMISSION: ICD-10-CM

## 2024-08-19 ENCOUNTER — RESULT REVIEW (OUTPATIENT)
Age: 74
End: 2024-08-19

## 2024-08-19 ENCOUNTER — APPOINTMENT (OUTPATIENT)
Dept: HEMATOLOGY ONCOLOGY | Facility: CLINIC | Age: 74
End: 2024-08-19
Payer: MEDICARE

## 2024-08-19 VITALS
WEIGHT: 150.99 LBS | BODY MASS INDEX: 26.13 KG/M2 | TEMPERATURE: 98.5 F | RESPIRATION RATE: 16 BRPM | OXYGEN SATURATION: 98 % | SYSTOLIC BLOOD PRESSURE: 184 MMHG | DIASTOLIC BLOOD PRESSURE: 74 MMHG | HEART RATE: 64 BPM

## 2024-08-19 VITALS — SYSTOLIC BLOOD PRESSURE: 188 MMHG | DIASTOLIC BLOOD PRESSURE: 81 MMHG

## 2024-08-19 DIAGNOSIS — E83.52 HYPERCALCEMIA: ICD-10-CM

## 2024-08-19 DIAGNOSIS — Z86.2 PERSONAL HISTORY OF DISEASES OF THE BLOOD AND BLOOD-FORMING ORGANS AND CERTAIN DISORDERS INVOLVING THE IMMUNE MECHANISM: ICD-10-CM

## 2024-08-19 DIAGNOSIS — C91.10 CHRONIC LYMPHOCYTIC LEUKEMIA OF B-CELL TYPE NOT HAVING ACHIEVED REMISSION: ICD-10-CM

## 2024-08-19 DIAGNOSIS — D80.1 NONFAMILIAL HYPOGAMMAGLOBULINEMIA: ICD-10-CM

## 2024-08-19 DIAGNOSIS — F41.9 ANXIETY DISORDER, UNSPECIFIED: ICD-10-CM

## 2024-08-19 LAB
ALBUMIN SERPL ELPH-MCNC: 4.4 G/DL
ALP BLD-CCNC: 84 U/L
ALT SERPL-CCNC: 18 U/L
ANION GAP SERPL CALC-SCNC: 14 MMOL/L
AST SERPL-CCNC: 27 U/L
BASOPHILS # BLD AUTO: 0 K/UL — SIGNIFICANT CHANGE UP (ref 0–0.2)
BASOPHILS NFR BLD AUTO: 0 % — SIGNIFICANT CHANGE UP (ref 0–2)
BILIRUB SERPL-MCNC: 0.3 MG/DL
BUN SERPL-MCNC: 15 MG/DL
CALCIUM SERPL-MCNC: 10.8 MG/DL
CHLORIDE SERPL-SCNC: 104 MMOL/L
CO2 SERPL-SCNC: 24 MMOL/L
CREAT SERPL-MCNC: 0.63 MG/DL
DEPRECATED KAPPA LC FREE/LAMBDA SER: 1.26 RATIO
EGFR: 94 ML/MIN/1.73M2
ELLIPTOCYTES BLD QL SMEAR: SLIGHT — SIGNIFICANT CHANGE UP
EOSINOPHIL # BLD AUTO: 0 K/UL — SIGNIFICANT CHANGE UP (ref 0–0.5)
EOSINOPHIL NFR BLD AUTO: 0 % — SIGNIFICANT CHANGE UP (ref 0–6)
GLUCOSE SERPL-MCNC: 112 MG/DL
HCT VFR BLD CALC: 39 % — SIGNIFICANT CHANGE UP (ref 34.5–45)
HGB BLD-MCNC: 11.8 G/DL — SIGNIFICANT CHANGE UP (ref 11.5–15.5)
IGA SER QL IEP: 91 MG/DL
IGG SER QL IEP: 798 MG/DL
IGM SER QL IEP: 24 MG/DL
KAPPA LC CSF-MCNC: 0.99 MG/DL
KAPPA LC SERPL-MCNC: 1.25 MG/DL
LDH SERPL-CCNC: 181 U/L
LYMPHOCYTES # BLD AUTO: 114.98 K/UL — HIGH (ref 1–3.3)
LYMPHOCYTES # BLD AUTO: 93 % — HIGH (ref 13–44)
MCHC RBC-ENTMCNC: 28.7 PG — SIGNIFICANT CHANGE UP (ref 27–34)
MCHC RBC-ENTMCNC: 30.3 G/DL — LOW (ref 32–36)
MCV RBC AUTO: 94.9 FL — SIGNIFICANT CHANGE UP (ref 80–100)
MONOCYTES # BLD AUTO: 2.47 K/UL — HIGH (ref 0–0.9)
MONOCYTES NFR BLD AUTO: 2 % — SIGNIFICANT CHANGE UP (ref 2–14)
NEUTROPHILS # BLD AUTO: 6.18 K/UL — SIGNIFICANT CHANGE UP (ref 1.8–7.4)
NEUTROPHILS NFR BLD AUTO: 5 % — LOW (ref 43–77)
NRBC # BLD: 0 /100 WBCS — SIGNIFICANT CHANGE UP (ref 0–0)
NRBC # BLD: SIGNIFICANT CHANGE UP /100 WBCS (ref 0–0)
PLAT MORPH BLD: NORMAL — SIGNIFICANT CHANGE UP
PLATELET # BLD AUTO: 112 K/UL — LOW (ref 150–400)
POTASSIUM SERPL-SCNC: 4 MMOL/L
PROT SERPL-MCNC: 6.7 G/DL
RBC # BLD: 4.11 M/UL — SIGNIFICANT CHANGE UP (ref 3.8–5.2)
RBC # FLD: 15.6 % — HIGH (ref 10.3–14.5)
RBC BLD AUTO: SIGNIFICANT CHANGE UP
SMUDGE CELLS # BLD: PRESENT — SIGNIFICANT CHANGE UP
SODIUM SERPL-SCNC: 143 MMOL/L
URATE SERPL-MCNC: 3.7 MG/DL
WBC # BLD: 123.63 K/UL — CRITICAL HIGH (ref 3.8–10.5)
WBC # FLD AUTO: 123.63 K/UL — CRITICAL HIGH (ref 3.8–10.5)

## 2024-08-19 PROCEDURE — 99213 OFFICE O/P EST LOW 20 MIN: CPT

## 2024-08-19 NOTE — REVIEW OF SYSTEMS
[Suicidal] : not suicidal [Insomnia] : insomnia [Anxiety] : anxiety [Depression] : depression [Negative] : Allergic/Immunologic [FreeTextEntry9] : fell as above, no signif injury [de-identified] : intermittent mildly incr Ca

## 2024-08-19 NOTE — HISTORY OF PRESENT ILLNESS
[de-identified] : 71 yo female with hx of CLL- Stage I diagnosed 1/2012, del(13q).  Treatment naive  Patient was admitted to [facility] 8/11-8/15/2022 with right upper lobe pneumonia - she was given ceftriaxone and doxycycline,  with improvement in symptoms and was discharged.  [de-identified] : Remains w/o constitutional c/o no new resp infections Ca normal on 8/4/23, 12/23 after  incr to 10.9 4/23 no new c/o except transient episode of vertigo few wks ago which has not recurred no tinnitus    anxious about her disease

## 2024-08-19 NOTE — PHYSICAL EXAM
[Fully active, able to carry on all pre-disease performance without restriction] : Status 0 - Fully active, able to carry on all pre-disease performance without restriction [Normal] : affect appropriate [de-identified] : soft, NT +spleen tip [de-identified] : subcm cervical nodes  [de-identified] : no CVAT

## 2024-08-19 NOTE — PHYSICAL EXAM
[Fully active, able to carry on all pre-disease performance without restriction] : Status 0 - Fully active, able to carry on all pre-disease performance without restriction [Normal] : affect appropriate [de-identified] : soft, NT +spleen tip [de-identified] : subcm cervical nodes  [de-identified] : no CVAT

## 2024-08-19 NOTE — ASSESSMENT
[FreeTextEntry1] : 72 yo woman with  Stage I CLL, continues to have stable disease and remains asymptomatic. she is therapy naive she has hypogammaglobulinemia but has not had increased resp. infections  CBC reviewed with pt: .83, Hgb 12.1, plt 125K, ANC 1.19, .27 lymph doubling time not reached for several years ALC one yr ago was 106.56 low ANC has now persisted, has been > 2.0 in past may be another indicator to start therapy if falls further  pt aware of infection risk will recheck CBC and diff in 6 weeks if repeat Ca increased, will check intact PTH, phos, 25-OH vit D3 level. continue observation check CMP, LDH, immunoglobulins RV 3 mos                   [Palliative] : Goals of care discussed with patient: Palliative [Palliative Care Plan] : not applicable at this time

## 2024-08-19 NOTE — REVIEW OF SYSTEMS
[Suicidal] : not suicidal [Insomnia] : insomnia [Anxiety] : anxiety [Depression] : depression [Negative] : Allergic/Immunologic [FreeTextEntry9] : fell as above, no signif injury [de-identified] : intermittent mildly incr Ca

## 2024-08-19 NOTE — HISTORY OF PRESENT ILLNESS
[de-identified] : 73 yo female with hx of CLL- Stage I diagnosed 1/2012, del(13q).  Treatment naive  Patient was admitted to [facility] 8/11-8/15/2022 with right upper lobe pneumonia - she was given ceftriaxone and doxycycline,  with improvement in symptoms and was discharged.  [de-identified] : Remains w/o constitutional c/o no new resp infections Ca normal on 8/4/23, 12/23 after  incr to 10.9 4/23 no new c/o except transient episode of vertigo few wks ago which has not recurred no tinnitus    anxious about her disease

## 2024-08-19 NOTE — ASSESSMENT
[FreeTextEntry1] : 74 yo woman with  Stage I CLL, continues to have stable disease and remains asymptomatic. she is therapy naive she has hypogammaglobulinemia but has not had increased resp. infections  CBC reviewed with pt: .83, Hgb 12.1, plt 125K, ANC 1.19, .27 lymph doubling time not reached for several years ALC one yr ago was 106.56 low ANC has now persisted, has been > 2.0 in past may be another indicator to start therapy if falls further  pt aware of infection risk will recheck CBC and diff in 6 weeks if repeat Ca increased, will check intact PTH, phos, 25-OH vit D3 level. continue observation check CMP, LDH, immunoglobulins RV 3 mos                   [Palliative] : Goals of care discussed with patient: Palliative [Palliative Care Plan] : not applicable at this time

## 2024-08-20 PROBLEM — Z86.2 HISTORY OF NEUTROPENIA: Status: RESOLVED | Noted: 2023-12-20 | Resolved: 2024-08-20

## 2024-08-20 RX ORDER — MUPIROCIN 20 MG/G
2 OINTMENT TOPICAL
Qty: 22 | Refills: 0 | Status: ACTIVE | COMMUNITY
Start: 2024-04-29

## 2024-11-11 ENCOUNTER — OUTPATIENT (OUTPATIENT)
Dept: OUTPATIENT SERVICES | Facility: HOSPITAL | Age: 74
LOS: 1 days | Discharge: HOME | End: 2024-11-11

## 2024-11-11 DIAGNOSIS — C91.10 CHRONIC LYMPHOCYTIC LEUKEMIA OF B-CELL TYPE NOT HAVING ACHIEVED REMISSION: ICD-10-CM

## 2024-11-18 ENCOUNTER — RESULT REVIEW (OUTPATIENT)
Age: 74
End: 2024-11-18

## 2024-11-18 ENCOUNTER — APPOINTMENT (OUTPATIENT)
Dept: HEMATOLOGY ONCOLOGY | Facility: CLINIC | Age: 74
End: 2024-11-18
Payer: MEDICARE

## 2024-11-18 VITALS
HEART RATE: 64 BPM | OXYGEN SATURATION: 98 % | SYSTOLIC BLOOD PRESSURE: 182 MMHG | TEMPERATURE: 97.3 F | BODY MASS INDEX: 25.94 KG/M2 | WEIGHT: 149.89 LBS | RESPIRATION RATE: 16 BRPM | DIASTOLIC BLOOD PRESSURE: 76 MMHG

## 2024-11-18 VITALS — DIASTOLIC BLOOD PRESSURE: 74 MMHG | SYSTOLIC BLOOD PRESSURE: 152 MMHG

## 2024-11-18 DIAGNOSIS — E83.52 HYPERCALCEMIA: ICD-10-CM

## 2024-11-18 DIAGNOSIS — C91.10 CHRONIC LYMPHOCYTIC LEUKEMIA OF B-CELL TYPE NOT HAVING ACHIEVED REMISSION: ICD-10-CM

## 2024-11-18 DIAGNOSIS — D70.9 NEUTROPENIA, UNSPECIFIED: ICD-10-CM

## 2024-11-18 DIAGNOSIS — F32.A DEPRESSION, UNSPECIFIED: ICD-10-CM

## 2024-11-18 DIAGNOSIS — F41.9 ANXIETY DISORDER, UNSPECIFIED: ICD-10-CM

## 2024-11-18 LAB
BASOPHILS # BLD AUTO: 0 K/UL — SIGNIFICANT CHANGE UP (ref 0–0.2)
BASOPHILS NFR BLD AUTO: 0 % — SIGNIFICANT CHANGE UP (ref 0–2)
ELLIPTOCYTES BLD QL SMEAR: SLIGHT — SIGNIFICANT CHANGE UP
EOSINOPHIL # BLD AUTO: 0 K/UL — SIGNIFICANT CHANGE UP (ref 0–0.5)
EOSINOPHIL NFR BLD AUTO: 0 % — SIGNIFICANT CHANGE UP (ref 0–6)
HCT VFR BLD CALC: 38.7 % — SIGNIFICANT CHANGE UP (ref 34.5–45)
HGB BLD-MCNC: 11.9 G/DL — SIGNIFICANT CHANGE UP (ref 11.5–15.5)
LYMPHOCYTES # BLD AUTO: 117.68 K/UL — HIGH (ref 1–3.3)
LYMPHOCYTES # BLD AUTO: 86 % — HIGH (ref 13–44)
LYMPHOCYTES # SPEC AUTO: 11.5 % — HIGH (ref 0–0)
MCHC RBC-ENTMCNC: 28.5 PG — SIGNIFICANT CHANGE UP (ref 27–34)
MCHC RBC-ENTMCNC: 30.7 G/DL — LOW (ref 32–36)
MCV RBC AUTO: 92.8 FL — SIGNIFICANT CHANGE UP (ref 80–100)
MONOCYTES # BLD AUTO: 2.74 K/UL — HIGH (ref 0–0.9)
MONOCYTES NFR BLD AUTO: 2 % — SIGNIFICANT CHANGE UP (ref 2–14)
NEUTROPHILS # BLD AUTO: 0.68 K/UL — LOW (ref 1.8–7.4)
NEUTROPHILS NFR BLD AUTO: 0.5 % — LOW (ref 43–77)
NRBC # BLD: 0 /100 WBCS — SIGNIFICANT CHANGE UP (ref 0–0)
NRBC # BLD: SIGNIFICANT CHANGE UP /100 WBCS (ref 0–0)
NRBC BLD-RTO: 0 /100 WBCS — SIGNIFICANT CHANGE UP (ref 0–0)
NRBC BLD-RTO: SIGNIFICANT CHANGE UP /100 WBCS (ref 0–0)
PLAT MORPH BLD: NORMAL — SIGNIFICANT CHANGE UP
PLATELET # BLD AUTO: 119 K/UL — LOW (ref 150–400)
POIKILOCYTOSIS BLD QL AUTO: SLIGHT — SIGNIFICANT CHANGE UP
RBC # BLD: 4.17 M/UL — SIGNIFICANT CHANGE UP (ref 3.8–5.2)
RBC # FLD: 15.4 % — HIGH (ref 10.3–14.5)
RBC BLD AUTO: ABNORMAL
SMUDGE CELLS # BLD: PRESENT — SIGNIFICANT CHANGE UP
WBC # BLD: 136.84 K/UL — CRITICAL HIGH (ref 3.8–10.5)
WBC # FLD AUTO: 136.84 K/UL — CRITICAL HIGH (ref 3.8–10.5)

## 2024-11-18 PROCEDURE — 99214 OFFICE O/P EST MOD 30 MIN: CPT

## 2024-11-18 PROCEDURE — G2211 COMPLEX E/M VISIT ADD ON: CPT

## 2024-11-18 RX ORDER — ESCITALOPRAM OXALATE 10 MG/1
10 TABLET ORAL
Qty: 30 | Refills: 3 | Status: ACTIVE | COMMUNITY
Start: 2024-11-18 | End: 1900-01-01

## 2024-11-19 PROBLEM — F32.A DEPRESSION, UNSPECIFIED DEPRESSION TYPE: Status: ACTIVE | Noted: 2024-11-19

## 2024-11-19 PROBLEM — D70.9 NEUTROPENIA, UNSPECIFIED TYPE: Status: ACTIVE | Noted: 2024-11-19

## 2025-02-07 ENCOUNTER — OUTPATIENT (OUTPATIENT)
Dept: OUTPATIENT SERVICES | Facility: HOSPITAL | Age: 75
LOS: 1 days | Discharge: HOME | End: 2025-02-07

## 2025-02-07 DIAGNOSIS — C91.10 CHRONIC LYMPHOCYTIC LEUKEMIA OF B-CELL TYPE NOT HAVING ACHIEVED REMISSION: ICD-10-CM

## 2025-02-10 ENCOUNTER — APPOINTMENT (OUTPATIENT)
Dept: HEMATOLOGY ONCOLOGY | Facility: CLINIC | Age: 75
End: 2025-02-10
Payer: MEDICARE

## 2025-02-10 ENCOUNTER — RESULT REVIEW (OUTPATIENT)
Age: 75
End: 2025-02-10

## 2025-02-10 VITALS
RESPIRATION RATE: 16 BRPM | BODY MASS INDEX: 26.06 KG/M2 | OXYGEN SATURATION: 96 % | TEMPERATURE: 97.5 F | DIASTOLIC BLOOD PRESSURE: 81 MMHG | WEIGHT: 150.58 LBS | HEART RATE: 59 BPM | SYSTOLIC BLOOD PRESSURE: 185 MMHG

## 2025-02-10 VITALS — SYSTOLIC BLOOD PRESSURE: 179 MMHG | DIASTOLIC BLOOD PRESSURE: 77 MMHG

## 2025-02-10 DIAGNOSIS — D80.1 NONFAMILIAL HYPOGAMMAGLOBULINEMIA: ICD-10-CM

## 2025-02-10 DIAGNOSIS — F41.9 ANXIETY DISORDER, UNSPECIFIED: ICD-10-CM

## 2025-02-10 DIAGNOSIS — C91.10 CHRONIC LYMPHOCYTIC LEUKEMIA OF B-CELL TYPE NOT HAVING ACHIEVED REMISSION: ICD-10-CM

## 2025-02-10 DIAGNOSIS — E05.90 THYROTOXICOSIS, UNSPECIFIED W/OUT THYROTOXIC CRISIS OR STORM: ICD-10-CM

## 2025-02-10 LAB
BASOPHILS # BLD AUTO: 0 K/UL — SIGNIFICANT CHANGE UP (ref 0–0.2)
BASOPHILS NFR BLD AUTO: 0 % — SIGNIFICANT CHANGE UP (ref 0–2)
ELLIPTOCYTES BLD QL SMEAR: SLIGHT — SIGNIFICANT CHANGE UP
EOSINOPHIL # BLD AUTO: 0 K/UL — SIGNIFICANT CHANGE UP (ref 0–0.5)
EOSINOPHIL NFR BLD AUTO: 0 % — SIGNIFICANT CHANGE UP (ref 0–6)
HCT VFR BLD CALC: 38.4 % — SIGNIFICANT CHANGE UP (ref 34.5–45)
HGB BLD-MCNC: 11.7 G/DL — SIGNIFICANT CHANGE UP (ref 11.5–15.5)
LYMPHOCYTES # BLD AUTO: 103.95 K/UL — HIGH (ref 1–3.3)
LYMPHOCYTES # BLD AUTO: 82 % — HIGH (ref 13–44)
LYMPHOCYTES # SPEC AUTO: 14.5 % — HIGH (ref 0–0)
MCHC RBC-ENTMCNC: 27.9 PG — SIGNIFICANT CHANGE UP (ref 27–34)
MCHC RBC-ENTMCNC: 30.5 G/DL — LOW (ref 32–36)
MCV RBC AUTO: 91.4 FL — SIGNIFICANT CHANGE UP (ref 80–100)
MONOCYTES # BLD AUTO: 0 K/UL — SIGNIFICANT CHANGE UP (ref 0–0.9)
MONOCYTES NFR BLD AUTO: 0 % — LOW (ref 2–14)
NEUTROPHILS # BLD AUTO: 4.44 K/UL — SIGNIFICANT CHANGE UP (ref 1.8–7.4)
NEUTROPHILS NFR BLD AUTO: 3.5 % — LOW (ref 43–77)
NRBC # BLD: 0 /100 WBCS — SIGNIFICANT CHANGE UP (ref 0–0)
NRBC # BLD: SIGNIFICANT CHANGE UP /100 WBCS (ref 0–0)
NRBC BLD-RTO: 0 /100 WBCS — SIGNIFICANT CHANGE UP (ref 0–0)
NRBC BLD-RTO: SIGNIFICANT CHANGE UP /100 WBCS (ref 0–0)
PLAT MORPH BLD: NORMAL — SIGNIFICANT CHANGE UP
PLATELET # BLD AUTO: 102 K/UL — LOW (ref 150–400)
POIKILOCYTOSIS BLD QL AUTO: SLIGHT — SIGNIFICANT CHANGE UP
RBC # BLD: 4.2 M/UL — SIGNIFICANT CHANGE UP (ref 3.8–5.2)
RBC # FLD: 16.4 % — HIGH (ref 10.3–14.5)
RBC BLD AUTO: ABNORMAL
SMUDGE CELLS # BLD: PRESENT — SIGNIFICANT CHANGE UP
WBC # BLD: 126.77 K/UL — CRITICAL HIGH (ref 3.8–10.5)
WBC # FLD AUTO: 126.77 K/UL — CRITICAL HIGH (ref 3.8–10.5)

## 2025-02-10 PROCEDURE — 99213 OFFICE O/P EST LOW 20 MIN: CPT

## 2025-02-10 PROCEDURE — G2211 COMPLEX E/M VISIT ADD ON: CPT

## 2025-02-10 RX ORDER — ESCITALOPRAM OXALATE 10 MG/1
10 TABLET ORAL
Qty: 90 | Refills: 3 | Status: ACTIVE | COMMUNITY
Start: 2025-02-10 | End: 1900-01-01

## 2025-02-11 LAB
T3FREE SERPL-MCNC: 2.93 PG/ML
T4 FREE SERPL-MCNC: 1.1 NG/DL
TSH SERPL-ACNC: 3.94 UIU/ML

## 2025-02-12 ENCOUNTER — NON-APPOINTMENT (OUTPATIENT)
Age: 75
End: 2025-02-12

## 2025-02-12 LAB
ALBUMIN SERPL ELPH-MCNC: 4.3 G/DL
ALP BLD-CCNC: 89 U/L
ALT SERPL-CCNC: 14 U/L
ANION GAP SERPL CALC-SCNC: 11 MMOL/L
AST SERPL-CCNC: 23 U/L
B2 MICROGLOB SERPL-MCNC: 2.5 MG/L
BILIRUB SERPL-MCNC: 0.4 MG/DL
BUN SERPL-MCNC: 14 MG/DL
CALCIUM SERPL-MCNC: 10.4 MG/DL
CHLORIDE SERPL-SCNC: 104 MMOL/L
CO2 SERPL-SCNC: 27 MMOL/L
CREAT SERPL-MCNC: 0.62 MG/DL
DEPRECATED KAPPA LC FREE/LAMBDA SER: 1.1 RATIO
EGFR: 94 ML/MIN/1.73M2
GLUCOSE SERPL-MCNC: 105 MG/DL
IGA SER QL IEP: 72 MG/DL
IGG SER QL IEP: 779 MG/DL
IGM SER QL IEP: 17 MG/DL
KAPPA LC CSF-MCNC: 1.03 MG/DL
KAPPA LC SERPL-MCNC: 1.13 MG/DL
POTASSIUM SERPL-SCNC: 3.8 MMOL/L
PROT SERPL-MCNC: 6.5 G/DL
SODIUM SERPL-SCNC: 142 MMOL/L

## 2025-02-12 RX ORDER — CARVEDILOL 6.25 MG/1
6.25 TABLET, FILM COATED ORAL
Qty: 180 | Refills: 0 | Status: ACTIVE | COMMUNITY
Start: 2025-02-08

## 2025-03-11 ENCOUNTER — LABORATORY RESULT (OUTPATIENT)
Age: 75
End: 2025-03-11

## 2025-03-11 ENCOUNTER — APPOINTMENT (OUTPATIENT)
Dept: INTERNAL MEDICINE | Facility: CLINIC | Age: 75
End: 2025-03-11
Payer: MEDICARE

## 2025-03-11 VITALS
RESPIRATION RATE: 12 BRPM | HEART RATE: 62 BPM | BODY MASS INDEX: 26.91 KG/M2 | HEIGHT: 62.6 IN | WEIGHT: 150 LBS | OXYGEN SATURATION: 95 %

## 2025-03-11 VITALS — SYSTOLIC BLOOD PRESSURE: 150 MMHG | DIASTOLIC BLOOD PRESSURE: 78 MMHG

## 2025-03-11 DIAGNOSIS — Z00.00 ENCOUNTER FOR GENERAL ADULT MEDICAL EXAMINATION W/OUT ABNORMAL FINDINGS: ICD-10-CM

## 2025-03-11 DIAGNOSIS — Z85.850 PERSONAL HISTORY OF MALIGNANT NEOPLASM OF THYROID: ICD-10-CM

## 2025-03-11 PROCEDURE — G0439: CPT

## 2025-03-12 LAB
25(OH)D3 SERPL-MCNC: 53.2 NG/ML
APPEARANCE: CLEAR
BILIRUBIN URINE: NEGATIVE
BLOOD URINE: NEGATIVE
COLOR: YELLOW
GLUCOSE QUALITATIVE U: NEGATIVE MG/DL
KETONES URINE: NEGATIVE MG/DL
LEUKOCYTE ESTERASE URINE: NEGATIVE
NITRITE URINE: NEGATIVE
PH URINE: 5.5
PROTEIN URINE: NEGATIVE MG/DL
SPECIFIC GRAVITY URINE: 1.01
UROBILINOGEN URINE: 0.2 MG/DL
VIT B12 SERPL-MCNC: 458 PG/ML

## 2025-03-17 LAB
BASOPHILS # BLD AUTO: 0 K/UL
BASOPHILS NFR BLD AUTO: 0 %
CHOLEST SERPL-MCNC: 173 MG/DL
EOSINOPHIL # BLD AUTO: 0 K/UL
EOSINOPHIL NFR BLD AUTO: 0 %
ESTIMATED AVERAGE GLUCOSE: 123 MG/DL
HBA1C MFR BLD HPLC: 5.9 %
HCT VFR BLD CALC: 43.7 %
HDLC SERPL-MCNC: 39 MG/DL
HGB BLD-MCNC: 12.1 G/DL
LDLC SERPL CALC-MCNC: 96 MG/DL
LYMPHOCYTES # BLD AUTO: 138.06 K/UL
LYMPHOCYTES NFR BLD AUTO: 92 %
MAN DIFF?: NORMAL
MCHC RBC-ENTMCNC: 27.7 G/DL
MCHC RBC-ENTMCNC: 27.8 PG
MCV RBC AUTO: 100.2 FL
MONOCYTES # BLD AUTO: 1.5 K/UL
MONOCYTES NFR BLD AUTO: 1 %
NEUTROPHILS # BLD AUTO: 3 K/UL
NEUTROPHILS NFR BLD AUTO: 2 %
NONHDLC SERPL-MCNC: 133 MG/DL
PLATELET # BLD AUTO: 133 K/UL
RBC # BLD: 4.36 M/UL
RBC # FLD: 18.6 %
TRIGL SERPL-MCNC: 218 MG/DL
TSH SERPL-ACNC: 4.42 UIU/ML
WBC # FLD AUTO: 150.07 K/UL

## 2025-03-18 LAB
ALBUMIN SERPL ELPH-MCNC: 4.5 G/DL
ALP BLD-CCNC: 96 U/L
ALT SERPL-CCNC: 15 U/L
ANION GAP SERPL CALC-SCNC: 12 MMOL/L
AST SERPL-CCNC: 25 U/L
BILIRUB SERPL-MCNC: 0.4 MG/DL
BUN SERPL-MCNC: 17 MG/DL
CALCIUM SERPL-MCNC: 10.7 MG/DL
CHLORIDE SERPL-SCNC: 106 MMOL/L
CO2 SERPL-SCNC: 25 MMOL/L
CREAT SERPL-MCNC: 0.65 MG/DL
EGFRCR SERPLBLD CKD-EPI 2021: 93 ML/MIN/1.73M2
GLUCOSE SERPL-MCNC: 96 MG/DL
POTASSIUM SERPL-SCNC: 4.1 MMOL/L
PROT SERPL-MCNC: 6.9 G/DL
SODIUM SERPL-SCNC: 143 MMOL/L

## 2025-03-24 NOTE — PROVIDER CONTACT NOTE (OTHER) - REASON
Poor joshua has a left ear infection! It was challenging to view his right ear drum because of a lot of wax.     Motrin (ibuprofen) Dosing:   Joshua's Weight Today:   Wt Readings from Last 1 Encounters:   03/24/25 13.2 kg (29 lb 3.2 oz) (89%, Z= 1.24)*     * Growth percentiles are based on WHO (Boys, 0-2 years) data.       Only for children older than 6 months of age.    You can give your child ibuprofen (Motrin) up to every 6 hours as needed for fever or pain.  Do not give more than 4 doses in a 24 hour period.  Always check the bottle for the concentration (Infant's versus Children's Motrin)      For INFANT'S Motrin (concentration 50mg/1.25mL):  Weight (pounds) Dose (mL)   12 to 17 pounds 1.25 mL   18 to 23 pounds 1.875 mL   24 to 35 pounds 2.5 mL   36 to 47 pounds 3.75 mL   48 to 59 pounds 5 mL       For CHILDREN'S Motrin (concentration 100mg/5mL):  Weight (pounds) Dose (mL)   12 to 17 pounds 2.5 mL   18 to 23 pounds 4 mL   24 to 35 pounds 5 mL   36 to 47 pounds 7.5 mL   48 to 59 pounds 10 mL   60 to 71 pounds 12.5 mL   72 to 95 pounds 15 mL   96 pounds and above 20 mL     Joshua's Weight Today:   Wt Readings from Last 1 Encounters:   03/24/25 13.2 kg (29 lb 3.2 oz) (89%, Z= 1.24)*     * Growth percentiles are based on WHO (Boys, 0-2 years) data.       Tylenol (acetaminophen) Dosing:    You can give acetaminophen (Children's Tylenol 160mg/5ml) up to every 4 hours as needed for fever or pain.  Do not give more than 6 doses in a 24 hour period.     --ROUND DOWN TO YOUR CHILD'S NEAREST WEIGHT--     Pounds (lbs) Amount (mL)   9 lbs 1.5 mL   10-11 lbs 2.0 mL   12-13 lbs 2.5 mL   14-16 lbs 3.0 mL   17-18 lbs 3.5 mL   19-21 lbs 4.0 mL   22-23 lbs 4.5 mL   24-27 lbs 5.0 mL   28-32 lbs 6.0 mL   33-37 lbs 7.0 mL   38-42 lbs 8.0 mL   43-46 lbs 9.0 mL   47-50 lbs 10.0 mL         Your child is suffering from an ear infection which is an infection of the middle part of the ear (Also called otitis media).  It is very common in  younger children - close to 50% of kids have had an ear infection by their 1st birthday. Your child may or may not have a fever, be irritable, not eating or sleeping well or be pulling at the ear. Ear infections most often develop after a viral illness which can cause inflammation in the mucosal membranes in the mouth and throat and impair Eustachian (ear tube) tube function.   If your child is less than 24 months of age, we will treat with antibiotics since we do not want to affect the hearing or speech. If your child is older than 24 months of age, we may choose to observe and watch for the next few days before adding on antibiotics.    If an antibiotic is started, it is always a good idea to add on probiotics to help regulate the gut bacteria and in case diarrhea should start from the antibiotic. Any children's probiotic will do, as will yogurts with live cultures if your child is old enough.    As with any medication, always look out for any side effects such as hives, rashes, facial swelling , vomiting or wheezing. Make sure to stop the antibiotic if any of these reactions occur and notify our office.     Keep your child well hydrated. Tylenol or Motrin(if over 6 months) is great for fevers and/or discomfort.  Please let us know if your child is not improving over the course of the next few days!     Temp 101.3 on IVIG

## 2025-03-25 ENCOUNTER — NON-APPOINTMENT (OUTPATIENT)
Age: 75
End: 2025-03-25

## 2025-03-25 ENCOUNTER — APPOINTMENT (OUTPATIENT)
Dept: INTERNAL MEDICINE | Facility: CLINIC | Age: 75
End: 2025-03-25
Payer: MEDICARE

## 2025-03-25 VITALS
OXYGEN SATURATION: 95 % | RESPIRATION RATE: 14 BRPM | HEART RATE: 62 BPM | TEMPERATURE: 98.9 F | WEIGHT: 151 LBS | BODY MASS INDEX: 27.09 KG/M2 | HEIGHT: 62.6 IN

## 2025-03-25 VITALS — SYSTOLIC BLOOD PRESSURE: 140 MMHG | DIASTOLIC BLOOD PRESSURE: 80 MMHG

## 2025-03-25 DIAGNOSIS — R05.9 COUGH, UNSPECIFIED: ICD-10-CM

## 2025-03-25 DIAGNOSIS — R04.0 EPISTAXIS: ICD-10-CM

## 2025-03-25 DIAGNOSIS — E83.52 HYPERCALCEMIA: ICD-10-CM

## 2025-03-25 DIAGNOSIS — C91.10 CHRONIC LYMPHOCYTIC LEUKEMIA OF B-CELL TYPE NOT HAVING ACHIEVED REMISSION: ICD-10-CM

## 2025-03-25 PROCEDURE — 99214 OFFICE O/P EST MOD 30 MIN: CPT

## 2025-03-25 RX ORDER — BENZONATATE 200 MG/1
200 CAPSULE ORAL
Qty: 30 | Refills: 0 | Status: ACTIVE | COMMUNITY
Start: 2025-03-25 | End: 1900-01-01

## 2025-03-25 RX ORDER — PROMETHAZINE HYDROCHLORIDE AND DEXTROMETHORPHAN HYDROBROMIDE ORAL SOLUTION 15; 6.25 MG/5ML; MG/5ML
6.25-15 SOLUTION ORAL
Qty: 1 | Refills: 0 | Status: ACTIVE | COMMUNITY
Start: 2025-03-25 | End: 1900-01-01

## 2025-03-27 LAB
INFLUENZA A RESULT: NOT DETECTED
INFLUENZA B RESULT: NOT DETECTED
RESP SYN VIRUS RESULT: NOT DETECTED
SARS-COV-2 RESULT: NOT DETECTED

## 2025-04-15 DIAGNOSIS — M81.0 AGE-RELATED OSTEOPOROSIS W/OUT CURRENT PATHOLOGICAL FRACTURE: ICD-10-CM

## 2025-04-15 DIAGNOSIS — R92.8 OTHER ABNORMAL AND INCONCLUSIVE FINDINGS ON DIAGNOSTIC IMAGING OF BREAST: ICD-10-CM

## 2025-04-15 RX ORDER — ALENDRONATE SODIUM 70 MG/1
70 TABLET ORAL
Qty: 12 | Refills: 1 | Status: ACTIVE | COMMUNITY
Start: 2025-04-15 | End: 1900-01-01

## 2025-04-28 ENCOUNTER — APPOINTMENT (OUTPATIENT)
Dept: INTERNAL MEDICINE | Facility: CLINIC | Age: 75
End: 2025-04-28

## 2025-05-07 ENCOUNTER — OUTPATIENT (OUTPATIENT)
Dept: OUTPATIENT SERVICES | Facility: HOSPITAL | Age: 75
LOS: 1 days | Discharge: HOME | End: 2025-05-07

## 2025-05-07 DIAGNOSIS — C91.10 CHRONIC LYMPHOCYTIC LEUKEMIA OF B-CELL TYPE NOT HAVING ACHIEVED REMISSION: ICD-10-CM

## 2025-05-12 ENCOUNTER — RESULT REVIEW (OUTPATIENT)
Age: 75
End: 2025-05-12

## 2025-05-12 ENCOUNTER — APPOINTMENT (OUTPATIENT)
Dept: HEMATOLOGY ONCOLOGY | Facility: CLINIC | Age: 75
End: 2025-05-12
Payer: MEDICARE

## 2025-05-12 VITALS
RESPIRATION RATE: 16 BRPM | HEART RATE: 62 BPM | SYSTOLIC BLOOD PRESSURE: 180 MMHG | WEIGHT: 149.89 LBS | BODY MASS INDEX: 26.9 KG/M2 | DIASTOLIC BLOOD PRESSURE: 75 MMHG | TEMPERATURE: 97.1 F | OXYGEN SATURATION: 97 %

## 2025-05-12 DIAGNOSIS — D80.1 NONFAMILIAL HYPOGAMMAGLOBULINEMIA: ICD-10-CM

## 2025-05-12 DIAGNOSIS — D69.6 THROMBOCYTOPENIA, UNSPECIFIED: ICD-10-CM

## 2025-05-12 DIAGNOSIS — F32.A DEPRESSION, UNSPECIFIED: ICD-10-CM

## 2025-05-12 DIAGNOSIS — Z86.2 PERSONAL HISTORY OF DISEASES OF THE BLOOD AND BLOOD-FORMING ORGANS AND CERTAIN DISORDERS INVOLVING THE IMMUNE MECHANISM: ICD-10-CM

## 2025-05-12 LAB
ANISOCYTOSIS BLD QL: SLIGHT — SIGNIFICANT CHANGE UP
BASOPHILS # BLD AUTO: 0 K/UL — SIGNIFICANT CHANGE UP (ref 0–0.2)
BASOPHILS NFR BLD AUTO: 0 % — SIGNIFICANT CHANGE UP (ref 0–2)
ELLIPTOCYTES BLD QL SMEAR: SLIGHT — SIGNIFICANT CHANGE UP
EOSINOPHIL # BLD AUTO: 0 K/UL — SIGNIFICANT CHANGE UP (ref 0–0.5)
EOSINOPHIL NFR BLD AUTO: 0 % — SIGNIFICANT CHANGE UP (ref 0–6)
HCT VFR BLD CALC: 37 % — SIGNIFICANT CHANGE UP (ref 34.5–45)
HGB BLD-MCNC: 11.3 G/DL — LOW (ref 11.5–15.5)
LYMPHOCYTES # BLD AUTO: 108.04 K/UL — HIGH (ref 1–3.3)
LYMPHOCYTES # BLD AUTO: 82.5 % — HIGH (ref 13–44)
LYMPHOCYTES # SPEC AUTO: 10.5 % — HIGH (ref 0–0)
MCHC RBC-ENTMCNC: 28.8 PG — SIGNIFICANT CHANGE UP (ref 27–34)
MCHC RBC-ENTMCNC: 30.5 G/DL — LOW (ref 32–36)
MCV RBC AUTO: 94.4 FL — SIGNIFICANT CHANGE UP (ref 80–100)
MONOCYTES # BLD AUTO: 4.58 K/UL — HIGH (ref 0–0.9)
MONOCYTES NFR BLD AUTO: 3.5 % — SIGNIFICANT CHANGE UP (ref 2–14)
NEUTROPHILS # BLD AUTO: 4.58 K/UL — SIGNIFICANT CHANGE UP (ref 1.8–7.4)
NEUTROPHILS NFR BLD AUTO: 3.5 % — LOW (ref 43–77)
NRBC # BLD: 0 /100 WBCS — SIGNIFICANT CHANGE UP (ref 0–0)
NRBC BLD AUTO-RTO: SIGNIFICANT CHANGE UP /100 WBCS (ref 0–0)
NRBC BLD-RTO: 0 /100 WBCS — SIGNIFICANT CHANGE UP (ref 0–0)
PLAT MORPH BLD: NORMAL — SIGNIFICANT CHANGE UP
PLATELET # BLD AUTO: 91 K/UL — LOW (ref 150–400)
POIKILOCYTOSIS BLD QL AUTO: SLIGHT — SIGNIFICANT CHANGE UP
RBC # BLD: 3.92 M/UL — SIGNIFICANT CHANGE UP (ref 3.8–5.2)
RBC # FLD: 16.8 % — HIGH (ref 10.3–14.5)
RBC BLD AUTO: ABNORMAL
SCHISTOCYTES BLD QL AUTO: SLIGHT — SIGNIFICANT CHANGE UP
SMUDGE CELLS # BLD: PRESENT — SIGNIFICANT CHANGE UP
WBC # BLD: 130.96 K/UL — CRITICAL HIGH (ref 3.8–10.5)
WBC # FLD AUTO: 130.96 K/UL — CRITICAL HIGH (ref 3.8–10.5)

## 2025-05-12 PROCEDURE — G2211 COMPLEX E/M VISIT ADD ON: CPT

## 2025-05-12 PROCEDURE — 99213 OFFICE O/P EST LOW 20 MIN: CPT

## 2025-05-14 LAB
ALBUMIN SERPL ELPH-MCNC: 4.2 G/DL
ALP BLD-CCNC: 89 U/L
ALT SERPL-CCNC: 14 U/L
ANION GAP SERPL CALC-SCNC: 10 MMOL/L
AST SERPL-CCNC: 28 U/L
B2 MICROGLOB SERPL-MCNC: 2.3 MG/L
BILIRUB SERPL-MCNC: 0.4 MG/DL
BUN SERPL-MCNC: 18 MG/DL
CALCIUM SERPL-MCNC: 10.1 MG/DL
CHLORIDE SERPL-SCNC: 105 MMOL/L
CO2 SERPL-SCNC: 27 MMOL/L
CREAT SERPL-MCNC: 0.63 MG/DL
DEPRECATED KAPPA LC FREE/LAMBDA SER: 0.86 RATIO
EGFRCR SERPLBLD CKD-EPI 2021: 94 ML/MIN/1.73M2
GLUCOSE SERPL-MCNC: 124 MG/DL
IGA SERPL-MCNC: 59 MG/DL
IGG SERPL-MCNC: 675 MG/DL
IGM SERPL-MCNC: 16 MG/DL
KAPPA LC CSF-MCNC: 1.08 MG/DL
KAPPA LC SERPL-MCNC: 0.93 MG/DL
POTASSIUM SERPL-SCNC: 3.5 MMOL/L
PROT SERPL-MCNC: 6.3 G/DL
SODIUM SERPL-SCNC: 142 MMOL/L

## 2025-05-15 PROBLEM — D69.6 THROMBOCYTOPENIA, ACQUIRED: Status: ACTIVE | Noted: 2025-05-15

## 2025-05-19 ENCOUNTER — APPOINTMENT (OUTPATIENT)
Dept: INTERNAL MEDICINE | Facility: CLINIC | Age: 75
End: 2025-05-19
Payer: MEDICARE

## 2025-05-19 VITALS — SYSTOLIC BLOOD PRESSURE: 136 MMHG | DIASTOLIC BLOOD PRESSURE: 72 MMHG

## 2025-05-19 VITALS
HEIGHT: 62 IN | HEART RATE: 60 BPM | BODY MASS INDEX: 27.23 KG/M2 | WEIGHT: 148 LBS | RESPIRATION RATE: 16 BRPM | TEMPERATURE: 97.2 F | OXYGEN SATURATION: 95 %

## 2025-05-19 DIAGNOSIS — R79.89 OTHER SPECIFIED ABNORMAL FINDINGS OF BLOOD CHEMISTRY: ICD-10-CM

## 2025-05-19 DIAGNOSIS — I10 ESSENTIAL (PRIMARY) HYPERTENSION: ICD-10-CM

## 2025-05-19 DIAGNOSIS — R92.8 OTHER ABNORMAL AND INCONCLUSIVE FINDINGS ON DIAGNOSTIC IMAGING OF BREAST: ICD-10-CM

## 2025-05-19 DIAGNOSIS — C91.10 CHRONIC LYMPHOCYTIC LEUKEMIA OF B-CELL TYPE NOT HAVING ACHIEVED REMISSION: ICD-10-CM

## 2025-05-19 DIAGNOSIS — F41.9 ANXIETY DISORDER, UNSPECIFIED: ICD-10-CM

## 2025-05-19 PROCEDURE — 99214 OFFICE O/P EST MOD 30 MIN: CPT

## 2025-05-20 LAB — TSH SERPL-ACNC: 3.05 UIU/ML

## 2025-08-19 ENCOUNTER — APPOINTMENT (OUTPATIENT)
Dept: INTERNAL MEDICINE | Facility: CLINIC | Age: 75
End: 2025-08-19

## 2025-08-25 ENCOUNTER — APPOINTMENT (OUTPATIENT)
Dept: HEMATOLOGY ONCOLOGY | Facility: CLINIC | Age: 75
End: 2025-08-25

## 2025-09-02 ENCOUNTER — RESULT REVIEW (OUTPATIENT)
Age: 75
End: 2025-09-02

## 2025-09-02 ENCOUNTER — APPOINTMENT (OUTPATIENT)
Dept: HEMATOLOGY ONCOLOGY | Facility: CLINIC | Age: 75
End: 2025-09-02
Payer: MEDICARE

## 2025-09-02 VITALS — DIASTOLIC BLOOD PRESSURE: 80 MMHG | SYSTOLIC BLOOD PRESSURE: 142 MMHG

## 2025-09-02 VITALS
RESPIRATION RATE: 16 BRPM | OXYGEN SATURATION: 95 % | WEIGHT: 149.89 LBS | SYSTOLIC BLOOD PRESSURE: 168 MMHG | HEART RATE: 64 BPM | TEMPERATURE: 97.2 F | DIASTOLIC BLOOD PRESSURE: 79 MMHG | BODY MASS INDEX: 27.42 KG/M2

## 2025-09-02 DIAGNOSIS — C91.10 CHRONIC LYMPHOCYTIC LEUKEMIA OF B-CELL TYPE NOT HAVING ACHIEVED REMISSION: ICD-10-CM

## 2025-09-02 DIAGNOSIS — D80.1 NONFAMILIAL HYPOGAMMAGLOBULINEMIA: ICD-10-CM

## 2025-09-02 DIAGNOSIS — I10 ESSENTIAL (PRIMARY) HYPERTENSION: ICD-10-CM

## 2025-09-02 DIAGNOSIS — Z63.5 DISRUPTION OF FAMILY BY SEPARATION AND DIVORCE: ICD-10-CM

## 2025-09-02 DIAGNOSIS — Z77.098 CONTACT WITH AND (SUSPECTED) EXPOSURE TO OTHER HAZARDOUS, CHIEFLY NONMEDICINAL, CHEMICALS: ICD-10-CM

## 2025-09-02 DIAGNOSIS — F41.9 ANXIETY DISORDER, UNSPECIFIED: ICD-10-CM

## 2025-09-02 LAB
ALBUMIN SERPL ELPH-MCNC: 4.5 G/DL
ALP BLD-CCNC: 88 U/L
ALT SERPL-CCNC: 16 U/L
ANION GAP SERPL CALC-SCNC: 13 MMOL/L
AST SERPL-CCNC: 26 U/L
B2 MICROGLOB SERPL-MCNC: 2.3 MG/L
BILIRUB SERPL-MCNC: 0.4 MG/DL
BUN SERPL-MCNC: 17 MG/DL
CALCIUM SERPL-MCNC: 10.3 MG/DL
CHLORIDE SERPL-SCNC: 103 MMOL/L
CO2 SERPL-SCNC: 26 MMOL/L
CREAT SERPL-MCNC: 0.65 MG/DL
DEPRECATED KAPPA LC FREE/LAMBDA SER: 0.93 RATIO
EGFRCR SERPLBLD CKD-EPI 2021: 92 ML/MIN/1.73M2
GLUCOSE SERPL-MCNC: 108 MG/DL
IGA SERPL-MCNC: 64 MG/DL
IGG SERPL-MCNC: 686 MG/DL
IGM SERPL-MCNC: 17 MG/DL
KAPPA LC CSF-MCNC: 0.97 MG/DL
KAPPA LC SERPL-MCNC: 0.9 MG/DL
POTASSIUM SERPL-SCNC: 3.8 MMOL/L
PROT SERPL-MCNC: 6.2 G/DL
SODIUM SERPL-SCNC: 142 MMOL/L

## 2025-09-02 PROCEDURE — 99214 OFFICE O/P EST MOD 30 MIN: CPT

## 2025-09-02 SDOH — SOCIAL STABILITY - SOCIAL INSECURITY: DISRUPTION OF FAMILY BY SEPARATION AND DIVORCE: Z63.5

## 2025-09-16 ENCOUNTER — APPOINTMENT (OUTPATIENT)
Dept: INTERNAL MEDICINE | Facility: CLINIC | Age: 75
End: 2025-09-16
Payer: MEDICARE

## 2025-09-16 VITALS
HEIGHT: 62 IN | WEIGHT: 151 LBS | BODY MASS INDEX: 27.79 KG/M2 | DIASTOLIC BLOOD PRESSURE: 71 MMHG | SYSTOLIC BLOOD PRESSURE: 149 MMHG | HEART RATE: 62 BPM | RESPIRATION RATE: 16 BRPM | OXYGEN SATURATION: 95 % | TEMPERATURE: 97.9 F

## 2025-09-16 VITALS — SYSTOLIC BLOOD PRESSURE: 140 MMHG | DIASTOLIC BLOOD PRESSURE: 84 MMHG

## 2025-09-16 DIAGNOSIS — R20.2 PARESTHESIA OF SKIN: ICD-10-CM

## 2025-09-16 DIAGNOSIS — R92.8 OTHER ABNORMAL AND INCONCLUSIVE FINDINGS ON DIAGNOSTIC IMAGING OF BREAST: ICD-10-CM

## 2025-09-16 PROCEDURE — 99214 OFFICE O/P EST MOD 30 MIN: CPT
